# Patient Record
Sex: MALE | Race: OTHER | HISPANIC OR LATINO
[De-identification: names, ages, dates, MRNs, and addresses within clinical notes are randomized per-mention and may not be internally consistent; named-entity substitution may affect disease eponyms.]

---

## 2017-10-12 ENCOUNTER — APPOINTMENT (OUTPATIENT)
Dept: RADIATION ONCOLOGY | Facility: CLINIC | Age: 72
End: 2017-10-12

## 2017-11-16 ENCOUNTER — APPOINTMENT (OUTPATIENT)
Dept: GASTROENTEROLOGY | Facility: CLINIC | Age: 72
End: 2017-11-16
Payer: MEDICARE

## 2017-11-16 VITALS
RESPIRATION RATE: 16 BRPM | OXYGEN SATURATION: 98 % | WEIGHT: 124 LBS | HEIGHT: 62 IN | HEART RATE: 67 BPM | BODY MASS INDEX: 22.82 KG/M2 | SYSTOLIC BLOOD PRESSURE: 164 MMHG | DIASTOLIC BLOOD PRESSURE: 89 MMHG

## 2017-11-16 DIAGNOSIS — E78.5 HYPERLIPIDEMIA, UNSPECIFIED: ICD-10-CM

## 2017-11-16 DIAGNOSIS — I10 ESSENTIAL (PRIMARY) HYPERTENSION: ICD-10-CM

## 2017-11-16 DIAGNOSIS — Z85.89 PERSONAL HISTORY OF MALIGNANT NEOPLASM OF OTHER ORGANS AND SYSTEMS: ICD-10-CM

## 2017-11-16 PROCEDURE — 99204 OFFICE O/P NEW MOD 45 MIN: CPT

## 2017-11-16 RX ORDER — CYCLOBENZAPRINE HYDROCHLORIDE 10 MG/1
10 TABLET, FILM COATED ORAL
Refills: 0 | Status: ACTIVE | COMMUNITY

## 2017-11-16 RX ORDER — OMEPRAZOLE 40 MG/1
40 CAPSULE, DELAYED RELEASE ORAL
Qty: 30 | Refills: 5 | Status: ACTIVE | COMMUNITY
Start: 2017-11-16 | End: 1900-01-01

## 2017-11-16 RX ORDER — LEVOTHYROXINE SODIUM 25 UG/1
25 TABLET ORAL
Refills: 0 | Status: ACTIVE | COMMUNITY

## 2017-11-16 RX ORDER — AMLODIPINE BESYLATE 5 MG/1
5 TABLET ORAL
Refills: 0 | Status: ACTIVE | COMMUNITY

## 2017-11-16 RX ORDER — SIMVASTATIN 10 MG/1
10 TABLET, FILM COATED ORAL
Refills: 0 | Status: ACTIVE | COMMUNITY

## 2017-11-29 ENCOUNTER — OUTPATIENT (OUTPATIENT)
Dept: OUTPATIENT SERVICES | Facility: HOSPITAL | Age: 72
LOS: 1 days | End: 2017-11-29
Payer: COMMERCIAL

## 2017-11-29 DIAGNOSIS — R13.10 DYSPHAGIA, UNSPECIFIED: ICD-10-CM

## 2017-11-29 DIAGNOSIS — Z85.89 PERSONAL HISTORY OF MALIGNANT NEOPLASM OF OTHER ORGANS AND SYSTEMS: Chronic | ICD-10-CM

## 2017-11-29 PROCEDURE — 74220 X-RAY XM ESOPHAGUS 1CNTRST: CPT | Mod: 26

## 2017-11-29 PROCEDURE — 74220 X-RAY XM ESOPHAGUS 1CNTRST: CPT

## 2017-11-30 LAB
ANION GAP SERPL CALC-SCNC: 14 MMOL/L
BASOPHILS # BLD AUTO: 0.02 K/UL
BASOPHILS NFR BLD AUTO: 0.2 %
BUN SERPL-MCNC: 10 MG/DL
CALCIUM SERPL-MCNC: 9.9 MG/DL
CHLORIDE SERPL-SCNC: 98 MMOL/L
CO2 SERPL-SCNC: 28 MMOL/L
CREAT SERPL-MCNC: 0.94 MG/DL
EOSINOPHIL # BLD AUTO: 0.05 K/UL
EOSINOPHIL NFR BLD AUTO: 0.6 %
GLUCOSE SERPL-MCNC: 84 MG/DL
HCT VFR BLD CALC: 36.3 %
HGB BLD-MCNC: 11.8 G/DL
IMM GRANULOCYTES NFR BLD AUTO: 0.1 %
INR PPP: 1.03 RATIO
LYMPHOCYTES # BLD AUTO: 1.7 K/UL
LYMPHOCYTES NFR BLD AUTO: 18.7 %
MCHC RBC-ENTMCNC: 28.9 PG
MCHC RBC-ENTMCNC: 32.5 GM/DL
MCV RBC AUTO: 89 FL
MONOCYTES # BLD AUTO: 0.74 K/UL
MONOCYTES NFR BLD AUTO: 8.1 %
NEUTROPHILS # BLD AUTO: 6.57 K/UL
NEUTROPHILS NFR BLD AUTO: 72.3 %
PLATELET # BLD AUTO: 271 K/UL
POTASSIUM SERPL-SCNC: 4.6 MMOL/L
PT BLD: 11.6 SEC
RBC # BLD: 4.08 M/UL
RBC # FLD: 13.8 %
SODIUM SERPL-SCNC: 140 MMOL/L
WBC # FLD AUTO: 9.09 K/UL

## 2018-04-03 ENCOUNTER — APPOINTMENT (OUTPATIENT)
Dept: GASTROENTEROLOGY | Facility: GI CENTER | Age: 73
End: 2018-04-03
Payer: MEDICARE

## 2018-04-03 ENCOUNTER — OUTPATIENT (OUTPATIENT)
Dept: OUTPATIENT SERVICES | Facility: HOSPITAL | Age: 73
LOS: 1 days | End: 2018-04-03
Payer: COMMERCIAL

## 2018-04-03 ENCOUNTER — RESULT REVIEW (OUTPATIENT)
Age: 73
End: 2018-04-03

## 2018-04-03 DIAGNOSIS — Z85.89 PERSONAL HISTORY OF MALIGNANT NEOPLASM OF OTHER ORGANS AND SYSTEMS: Chronic | ICD-10-CM

## 2018-04-03 DIAGNOSIS — R19.4 CHANGE IN BOWEL HABIT: ICD-10-CM

## 2018-04-03 DIAGNOSIS — R13.10 DYSPHAGIA, UNSPECIFIED: ICD-10-CM

## 2018-04-03 PROCEDURE — 88104 CYTOPATH FL NONGYN SMEARS: CPT

## 2018-04-03 PROCEDURE — 88342 IMHCHEM/IMCYTCHM 1ST ANTB: CPT

## 2018-04-03 PROCEDURE — T1013: CPT

## 2018-04-03 PROCEDURE — 88305 TISSUE EXAM BY PATHOLOGIST: CPT | Mod: 26

## 2018-04-03 PROCEDURE — 43239 EGD BIOPSY SINGLE/MULTIPLE: CPT

## 2018-04-03 PROCEDURE — 88305 TISSUE EXAM BY PATHOLOGIST: CPT

## 2018-04-03 PROCEDURE — 88342 IMHCHEM/IMCYTCHM 1ST ANTB: CPT | Mod: 26

## 2018-04-03 PROCEDURE — 88104 CYTOPATH FL NONGYN SMEARS: CPT | Mod: 26

## 2018-04-06 LAB
NON-GYN CYTOLOGY SPEC: SIGNIFICANT CHANGE UP
SURGICAL PATHOLOGY FINAL REPORT - CH: SIGNIFICANT CHANGE UP

## 2018-04-10 ENCOUNTER — OTHER (OUTPATIENT)
Age: 73
End: 2018-04-10

## 2018-04-10 DIAGNOSIS — C15.9 MALIGNANT NEOPLASM OF ESOPHAGUS, UNSPECIFIED: ICD-10-CM

## 2018-04-11 ENCOUNTER — OTHER (OUTPATIENT)
Age: 73
End: 2018-04-11

## 2018-05-15 ENCOUNTER — OTHER (OUTPATIENT)
Age: 73
End: 2018-05-15

## 2018-06-05 ENCOUNTER — APPOINTMENT (OUTPATIENT)
Dept: CT IMAGING | Facility: CLINIC | Age: 73
End: 2018-06-05

## 2018-06-18 ENCOUNTER — INPATIENT (INPATIENT)
Facility: HOSPITAL | Age: 73
LOS: 9 days | Discharge: HOME | DRG: 374 | End: 2018-06-28
Attending: HOSPITALIST | Admitting: HOSPITALIST
Payer: MEDICARE

## 2018-06-18 VITALS
HEIGHT: 62 IN | DIASTOLIC BLOOD PRESSURE: 73 MMHG | HEART RATE: 72 BPM | TEMPERATURE: 98 F | WEIGHT: 111.99 LBS | SYSTOLIC BLOOD PRESSURE: 143 MMHG | RESPIRATION RATE: 18 BRPM | OXYGEN SATURATION: 98 %

## 2018-06-18 DIAGNOSIS — R13.10 DYSPHAGIA, UNSPECIFIED: ICD-10-CM

## 2018-06-18 DIAGNOSIS — Z85.89 PERSONAL HISTORY OF MALIGNANT NEOPLASM OF OTHER ORGANS AND SYSTEMS: Chronic | ICD-10-CM

## 2018-06-18 LAB
ALBUMIN SERPL ELPH-MCNC: 3.5 G/DL — SIGNIFICANT CHANGE UP (ref 3.3–5.2)
ALP SERPL-CCNC: 61 U/L — SIGNIFICANT CHANGE UP (ref 40–120)
ALT FLD-CCNC: 7 U/L — SIGNIFICANT CHANGE UP
ANION GAP SERPL CALC-SCNC: 15 MMOL/L — SIGNIFICANT CHANGE UP (ref 5–17)
APTT BLD: 30.9 SEC — SIGNIFICANT CHANGE UP (ref 27.5–37.4)
AST SERPL-CCNC: 14 U/L — SIGNIFICANT CHANGE UP
BASOPHILS # BLD AUTO: 0 K/UL — SIGNIFICANT CHANGE UP (ref 0–0.2)
BASOPHILS NFR BLD AUTO: 0.2 % — SIGNIFICANT CHANGE UP (ref 0–2)
BILIRUB SERPL-MCNC: 0.4 MG/DL — SIGNIFICANT CHANGE UP (ref 0.4–2)
BUN SERPL-MCNC: 18 MG/DL — SIGNIFICANT CHANGE UP (ref 8–20)
CALCIUM SERPL-MCNC: 9.2 MG/DL — SIGNIFICANT CHANGE UP (ref 8.6–10.2)
CHLORIDE SERPL-SCNC: 100 MMOL/L — SIGNIFICANT CHANGE UP (ref 98–107)
CO2 SERPL-SCNC: 25 MMOL/L — SIGNIFICANT CHANGE UP (ref 22–29)
CREAT SERPL-MCNC: 0.83 MG/DL — SIGNIFICANT CHANGE UP (ref 0.5–1.3)
EOSINOPHIL # BLD AUTO: 0.1 K/UL — SIGNIFICANT CHANGE UP (ref 0–0.5)
EOSINOPHIL NFR BLD AUTO: 0.6 % — SIGNIFICANT CHANGE UP (ref 0–5)
GLUCOSE SERPL-MCNC: 97 MG/DL — SIGNIFICANT CHANGE UP (ref 70–115)
HCT VFR BLD CALC: 33.3 % — LOW (ref 42–52)
HGB BLD-MCNC: 10.6 G/DL — LOW (ref 14–18)
INR BLD: 1.22 RATIO — HIGH (ref 0.88–1.16)
LIDOCAIN IGE QN: 11 U/L — LOW (ref 22–51)
LYMPHOCYTES # BLD AUTO: 1 K/UL — SIGNIFICANT CHANGE UP (ref 1–4.8)
LYMPHOCYTES # BLD AUTO: 7.9 % — LOW (ref 20–55)
MCHC RBC-ENTMCNC: 26.8 PG — LOW (ref 27–31)
MCHC RBC-ENTMCNC: 31.8 G/DL — LOW (ref 32–36)
MCV RBC AUTO: 84.1 FL — SIGNIFICANT CHANGE UP (ref 80–94)
MONOCYTES # BLD AUTO: 0.6 K/UL — SIGNIFICANT CHANGE UP (ref 0–0.8)
MONOCYTES NFR BLD AUTO: 4.9 % — SIGNIFICANT CHANGE UP (ref 3–10)
NEUTROPHILS # BLD AUTO: 10.4 K/UL — HIGH (ref 1.8–8)
NEUTROPHILS NFR BLD AUTO: 86.1 % — HIGH (ref 37–73)
PLATELET # BLD AUTO: 331 K/UL — SIGNIFICANT CHANGE UP (ref 150–400)
POTASSIUM SERPL-MCNC: 4.2 MMOL/L — SIGNIFICANT CHANGE UP (ref 3.5–5.3)
POTASSIUM SERPL-SCNC: 4.2 MMOL/L — SIGNIFICANT CHANGE UP (ref 3.5–5.3)
PROT SERPL-MCNC: 7.5 G/DL — SIGNIFICANT CHANGE UP (ref 6.6–8.7)
PROTHROM AB SERPL-ACNC: 13.5 SEC — HIGH (ref 9.8–12.7)
RBC # BLD: 3.96 M/UL — LOW (ref 4.6–6.2)
RBC # FLD: 13.5 % — SIGNIFICANT CHANGE UP (ref 11–15.6)
SODIUM SERPL-SCNC: 140 MMOL/L — SIGNIFICANT CHANGE UP (ref 135–145)
TROPONIN T SERPL-MCNC: <0.01 NG/ML — SIGNIFICANT CHANGE UP (ref 0–0.06)
WBC # BLD: 12.1 K/UL — HIGH (ref 4.8–10.8)
WBC # FLD AUTO: 12.1 K/UL — HIGH (ref 4.8–10.8)

## 2018-06-18 PROCEDURE — 71045 X-RAY EXAM CHEST 1 VIEW: CPT | Mod: 26

## 2018-06-18 PROCEDURE — 99223 1ST HOSP IP/OBS HIGH 75: CPT

## 2018-06-18 PROCEDURE — 93010 ELECTROCARDIOGRAM REPORT: CPT

## 2018-06-18 PROCEDURE — 99285 EMERGENCY DEPT VISIT HI MDM: CPT | Mod: 25

## 2018-06-18 RX ORDER — FERROUS SULFATE 325(65) MG
325 TABLET ORAL THREE TIMES A DAY
Qty: 0 | Refills: 0 | Status: DISCONTINUED | OUTPATIENT
Start: 2018-06-18 | End: 2018-06-28

## 2018-06-18 RX ORDER — SODIUM CHLORIDE 9 MG/ML
1000 INJECTION INTRAMUSCULAR; INTRAVENOUS; SUBCUTANEOUS
Qty: 0 | Refills: 0 | Status: DISCONTINUED | OUTPATIENT
Start: 2018-06-18 | End: 2018-06-23

## 2018-06-18 RX ORDER — AMLODIPINE BESYLATE 2.5 MG/1
5 TABLET ORAL DAILY
Qty: 0 | Refills: 0 | Status: DISCONTINUED | OUTPATIENT
Start: 2018-06-18 | End: 2018-06-28

## 2018-06-18 RX ORDER — SODIUM CHLORIDE 9 MG/ML
1000 INJECTION INTRAMUSCULAR; INTRAVENOUS; SUBCUTANEOUS ONCE
Qty: 0 | Refills: 0 | Status: COMPLETED | OUTPATIENT
Start: 2018-06-18 | End: 2018-06-18

## 2018-06-18 RX ORDER — LEVOTHYROXINE SODIUM 125 MCG
25 TABLET ORAL DAILY
Qty: 0 | Refills: 0 | Status: DISCONTINUED | OUTPATIENT
Start: 2018-06-18 | End: 2018-06-28

## 2018-06-18 RX ORDER — FAMOTIDINE 10 MG/ML
20 INJECTION INTRAVENOUS ONCE
Qty: 0 | Refills: 0 | Status: COMPLETED | OUTPATIENT
Start: 2018-06-18 | End: 2018-06-18

## 2018-06-18 RX ORDER — ONDANSETRON 8 MG/1
4 TABLET, FILM COATED ORAL ONCE
Qty: 0 | Refills: 0 | Status: COMPLETED | OUTPATIENT
Start: 2018-06-18 | End: 2018-06-18

## 2018-06-18 RX ORDER — ENOXAPARIN SODIUM 100 MG/ML
40 INJECTION SUBCUTANEOUS DAILY
Qty: 0 | Refills: 0 | Status: DISCONTINUED | OUTPATIENT
Start: 2018-06-18 | End: 2018-06-24

## 2018-06-18 RX ORDER — SIMVASTATIN 20 MG/1
10 TABLET, FILM COATED ORAL AT BEDTIME
Qty: 0 | Refills: 0 | Status: DISCONTINUED | OUTPATIENT
Start: 2018-06-18 | End: 2018-06-28

## 2018-06-18 RX ADMIN — ENOXAPARIN SODIUM 40 MILLIGRAM(S): 100 INJECTION SUBCUTANEOUS at 14:43

## 2018-06-18 RX ADMIN — FAMOTIDINE 20 MILLIGRAM(S): 10 INJECTION INTRAVENOUS at 08:51

## 2018-06-18 RX ADMIN — ONDANSETRON 4 MILLIGRAM(S): 8 TABLET, FILM COATED ORAL at 08:51

## 2018-06-18 RX ADMIN — Medication 325 MILLIGRAM(S): at 22:55

## 2018-06-18 RX ADMIN — Medication 325 MILLIGRAM(S): at 14:44

## 2018-06-18 RX ADMIN — Medication 25 MICROGRAM(S): at 14:44

## 2018-06-18 RX ADMIN — SIMVASTATIN 10 MILLIGRAM(S): 20 TABLET, FILM COATED ORAL at 22:56

## 2018-06-18 RX ADMIN — AMLODIPINE BESYLATE 5 MILLIGRAM(S): 2.5 TABLET ORAL at 14:44

## 2018-06-18 RX ADMIN — SODIUM CHLORIDE 1000 MILLILITER(S): 9 INJECTION INTRAMUSCULAR; INTRAVENOUS; SUBCUTANEOUS at 08:50

## 2018-06-18 NOTE — H&P ADULT - NSHPPHYSICALEXAM_GEN_ALL_CORE
PHYSICAL EXAM:    GENERAL: NAD, emaciated   HEAD:  Atraumatic, Normocephalic  EYES: EOMI, PERRLA  NERVOUS SYSTEM:  Alert & Oriented X3, Good concentration;   CHEST/LUNG: Clear to percussion bilaterally; No rales  HEART: Regular rate and rhythm; No murmurs, rubs, or gallops  ABDOMEN: Soft, Nontender,  EXTREMITIES:  muscle wasting, no edema   LYMPH: No lymphadenopathy noted  SKIN: No rashes or lesions

## 2018-06-18 NOTE — H&P ADULT - HISTORY OF PRESENT ILLNESS
Patient is a 73 yom with pmh  of htn, hld presents from home with difficulty swallowing which has gotten progressively got worse and now can only tolerate liquids. Patient is a very poor historian and even using a , patient   states he was just diagnosed with the esophageal cancer recently, but the records show he was diagnosed 7 years prior. Patient also complains of weight loss, lethargy and chest discomfort.  Patient denies having an oncologist and is  unaware of what meds he takes. Spoke to GI who states he had recent outpatient workup and had a ct and egd. Patient being admitted for failure to thrive. Patient denies any other complaints

## 2018-06-18 NOTE — H&P ADULT - ASSESSMENT
1) Newly diagnosed Esophageal cancer --> consulted hematology  --> spoke to GI and CT surgery   --> will continue clears for now    2) htn --> unsure of home meds  --> will call pharmacy    3) hld --> call pharmacy    4) dvt prop --> lovenox sub q    5) diet --> clear liquid diet

## 2018-06-18 NOTE — ED PROVIDER NOTE - MUSCULOSKELETAL, MLM
Spine appears normal, range of motion is not limited, no muscle or joint tenderness. Moves all 4 extremities, good pulses present.

## 2018-06-18 NOTE — ED PROVIDER NOTE - PROGRESS NOTE DETAILS
obtained CT/endoscopy results -- patient with esophageal cancer -- patient now admits to knowing the diagnosis but has not followed up  with dysphagia and esophageal mass, will admit  d/w Dr. Chuy LIZAMA to consult

## 2018-06-18 NOTE — ED PROVIDER NOTE - MEDICAL DECISION MAKING DETAILS
Vomiting after eating, questionable dysphasia with recent endoscopy. Check labs, attempt to contact PMD for endoscopy results and re-eval.

## 2018-06-18 NOTE — ED PROVIDER NOTE - OBJECTIVE STATEMENT
72 y/o M with PMHx of HTN and head and neck cancer (diagnosis 7 years ago, underwent chemo and radiation treatment, currently in remission) presents to ED c/o episodes of difficulty swallowing, emesis after eating and chest pain onset 4 days ago. States he does not have difficulty tolerating liquids but symptoms begin with eating solid foods. Denies fevers, chills, SOB, cough or any pain currently. States he recently say GI doctor, had endoscopy done, does not know results, and states he has another appointment tomorrow. No further acute complaints at this time.     PMD: Dr. Infante  GI: Dr. Bryson

## 2018-06-18 NOTE — ED ADULT NURSE NOTE - OBJECTIVE STATEMENT
73 x couple of weeks, every time he eats he feels like something is stuck, patient states he cannot keep anything down. patient states he went for an endoscopy,. appointments for results tomorrow. patient denies diarrhea, nausea but c/o vomiting, states last time he vomited was today. 73 x couple of weeks, every time he eats he feels like something is stuck, patient states he cannot keep anything down. patient states he went for an endoscopy,. appointments for results tomorrow. patient denies diarrhea, nausea but c/o vomiting, states last time he vomited was today. patient states he noticed he lost weight so wanted to come in.

## 2018-06-18 NOTE — ED PROVIDER NOTE - ENMT, MLM
Airway patent, Nasal mucosa clear. Mouth with normal mucosa. Throat has no vesicles, no oropharyngeal exudates and uvula is midline. Trache midline. Post surgical changes to L mandible. Tolerating secretions.

## 2018-06-18 NOTE — CONSULT NOTE ADULT - ASSESSMENT
Patient with recent squamous cell cancer, of distal esophagus/cardia.     1. Will recommend CT surgery, general surgery, oncology and radiation oncology evaluation  2. Consider rescanning-CT chest/abdomen  3. May need EUS/esophageal stenting   4. J tube evaluation  5. GI will follow up

## 2018-06-18 NOTE — CONSULT NOTE ADULT - ASSESSMENT
73 year old male with pmhx noted above most notably esophageal CA who presenting with worsening dysphagia being admitted to medicine service due to failure to thrive.

## 2018-06-18 NOTE — CONSULT NOTE ADULT - PROBLEM SELECTOR RECOMMENDATION 9
Admit to medicine service, Dr. Abebe recommending j tube placement by general surgery team. Patient unlikely candidate for resection due to high risk invasiveness of mass causing dysphagia. Thoracic surgery will continue to follow.

## 2018-06-18 NOTE — H&P ADULT - NSHPLABSRESULTS_GEN_ALL_CORE
10.6   12.1   )----------(  331       ( 18 Jun 2018 08:25 )               33.3      140    |  100    |  18.0   ----------------------------<  97         ( 18 Jun 2018 08:25 )  4.2     |  25.0   |  0.83     Ca    9.2        ( 18 Jun 2018 08:25 )    TPro  7.5    /  Alb  3.5    /  TBili  0.4    /  DBili  x      /  AST  14     /  ALT  7      /  AlkPhos  61     ( 18 Jun 2018 08:25 )    LIVER FUNCTIONS - ( 18 Jun 2018 08:25 )  Alb: 3.5 g/dL / Pro: 7.5 g/dL / ALK PHOS: 61 U/L / ALT: 7 U/L / AST: 14 U/L / GGT: x           PT/INR -  13.5 sec / 1.22 ratio   ( 18 Jun 2018 08:25 )       PTT -  30.9 sec   ( 18 Jun 2018 08:25 )  CAPILLARY BLOOD GLUCOSE    CARDIAC MARKERS ( 18 Jun 2018 08:25 )  x     / <0.01 ng/mL / x     / x     / x

## 2018-06-19 DIAGNOSIS — E78.00 PURE HYPERCHOLESTEROLEMIA, UNSPECIFIED: ICD-10-CM

## 2018-06-19 DIAGNOSIS — I10 ESSENTIAL (PRIMARY) HYPERTENSION: ICD-10-CM

## 2018-06-19 DIAGNOSIS — K22.9 DISEASE OF ESOPHAGUS, UNSPECIFIED: ICD-10-CM

## 2018-06-19 DIAGNOSIS — E03.9 HYPOTHYROIDISM, UNSPECIFIED: ICD-10-CM

## 2018-06-19 LAB
ANION GAP SERPL CALC-SCNC: 13 MMOL/L — SIGNIFICANT CHANGE UP (ref 5–17)
APPEARANCE UR: CLEAR — SIGNIFICANT CHANGE UP
BILIRUB UR-MCNC: NEGATIVE — SIGNIFICANT CHANGE UP
BUN SERPL-MCNC: 15 MG/DL — SIGNIFICANT CHANGE UP (ref 8–20)
CALCIUM SERPL-MCNC: 8.9 MG/DL — SIGNIFICANT CHANGE UP (ref 8.6–10.2)
CHLORIDE SERPL-SCNC: 100 MMOL/L — SIGNIFICANT CHANGE UP (ref 98–107)
CO2 SERPL-SCNC: 25 MMOL/L — SIGNIFICANT CHANGE UP (ref 22–29)
COLOR SPEC: YELLOW — SIGNIFICANT CHANGE UP
CREAT SERPL-MCNC: 0.87 MG/DL — SIGNIFICANT CHANGE UP (ref 0.5–1.3)
DIFF PNL FLD: ABNORMAL
GLUCOSE SERPL-MCNC: 95 MG/DL — SIGNIFICANT CHANGE UP (ref 70–115)
GLUCOSE UR QL: 250 MG/DL
HCT VFR BLD CALC: 33.5 % — LOW (ref 42–52)
HGB BLD-MCNC: 10.7 G/DL — LOW (ref 14–18)
KETONES UR-MCNC: ABNORMAL
LEUKOCYTE ESTERASE UR-ACNC: NEGATIVE — SIGNIFICANT CHANGE UP
MAGNESIUM SERPL-MCNC: 2 MG/DL — SIGNIFICANT CHANGE UP (ref 1.8–2.6)
MCHC RBC-ENTMCNC: 26.8 PG — LOW (ref 27–31)
MCHC RBC-ENTMCNC: 31.9 G/DL — LOW (ref 32–36)
MCV RBC AUTO: 83.8 FL — SIGNIFICANT CHANGE UP (ref 80–94)
NITRITE UR-MCNC: NEGATIVE — SIGNIFICANT CHANGE UP
PH UR: 5 — SIGNIFICANT CHANGE UP (ref 5–8)
PLATELET # BLD AUTO: 338 K/UL — SIGNIFICANT CHANGE UP (ref 150–400)
POTASSIUM SERPL-MCNC: 4.8 MMOL/L — SIGNIFICANT CHANGE UP (ref 3.5–5.3)
POTASSIUM SERPL-SCNC: 4.8 MMOL/L — SIGNIFICANT CHANGE UP (ref 3.5–5.3)
PROT UR-MCNC: 15 MG/DL
RBC # BLD: 4 M/UL — LOW (ref 4.6–6.2)
RBC # FLD: 13.4 % — SIGNIFICANT CHANGE UP (ref 11–15.6)
SODIUM SERPL-SCNC: 138 MMOL/L — SIGNIFICANT CHANGE UP (ref 135–145)
SP GR SPEC: 1.02 — SIGNIFICANT CHANGE UP (ref 1.01–1.02)
UROBILINOGEN FLD QL: NEGATIVE MG/DL — SIGNIFICANT CHANGE UP
WBC # BLD: 11.3 K/UL — HIGH (ref 4.8–10.8)
WBC # FLD AUTO: 11.3 K/UL — HIGH (ref 4.8–10.8)

## 2018-06-19 PROCEDURE — 99233 SBSQ HOSP IP/OBS HIGH 50: CPT

## 2018-06-19 PROCEDURE — 99233 SBSQ HOSP IP/OBS HIGH 50: CPT | Mod: GC

## 2018-06-19 RX ADMIN — Medication 325 MILLIGRAM(S): at 23:19

## 2018-06-19 RX ADMIN — SIMVASTATIN 10 MILLIGRAM(S): 20 TABLET, FILM COATED ORAL at 23:19

## 2018-06-19 RX ADMIN — Medication 325 MILLIGRAM(S): at 18:06

## 2018-06-19 RX ADMIN — SODIUM CHLORIDE 100 MILLILITER(S): 9 INJECTION INTRAMUSCULAR; INTRAVENOUS; SUBCUTANEOUS at 21:00

## 2018-06-19 RX ADMIN — SODIUM CHLORIDE 100 MILLILITER(S): 9 INJECTION INTRAMUSCULAR; INTRAVENOUS; SUBCUTANEOUS at 04:26

## 2018-06-19 RX ADMIN — AMLODIPINE BESYLATE 5 MILLIGRAM(S): 2.5 TABLET ORAL at 05:47

## 2018-06-19 RX ADMIN — Medication 25 MICROGRAM(S): at 05:47

## 2018-06-19 RX ADMIN — Medication 325 MILLIGRAM(S): at 05:47

## 2018-06-19 RX ADMIN — ENOXAPARIN SODIUM 40 MILLIGRAM(S): 100 INJECTION SUBCUTANEOUS at 11:35

## 2018-06-19 NOTE — PROGRESS NOTE ADULT - ASSESSMENT
73 y.o. male with PMHx of HTN, HLD, recent diagnosis of esophageal CA p/w difficulty swallowing associated with tolerance of liquids only, weight loss, lethargy and chest discomfort. Planned for HemOnc evaluation and surgery for J tube placement

## 2018-06-19 NOTE — CONSULT NOTE ADULT - ATTENDING COMMENTS
Metastatic workup - CT C/A/P  If possible would favor stent vs. PEG  If scope cannot be passed, would take patient for operative J tube  Needs chemoXRT if no signs of mets  Would re-evaluate for resection after course of chemoXRT if no mets or progression of disease

## 2018-06-19 NOTE — PROGRESS NOTE ADULT - SUBJECTIVE AND OBJECTIVE BOX
CC: poor po intake  HPI: 73 y.o. male with PMHx of HTN, HLD, recent diagnosis of esophageal CA p/w difficulty swallowing associated with tolerance of liquids only, weight loss, lethargy and chest discomfort.      INTERVAL HPI/OVERNIGHT EVENTS:no complaints  Other ROS reviewed and neg     Vital Signs Last 24 Hrs  T(C): 37 (2018 12:11), Max: 37.5 (2018 03:47)  T(F): 98.6 (2018 12:11), Max: 99.5 (2018 03:47)  HR: 52 (2018 12:11) (52 - 120)  BP: 115/63 (2018 12:11) (115/63 - 153/67)  RR: 18 (2018 12:11) (18 - 18)  SpO2: 97% (2018 12:11) (95% - 99%)  I&O's Detail    2018 07:01  -  2018 13:09  --------------------------------------------------------  IN:    sodium chloride 0.9%.: 100 mL  Total IN: 100 mL    OUT:  Total OUT: 0 mL    Total NET: 100 mL    CARDIAC MARKERS ( 2018 13:46 )  x     / <0.01 ng/mL / x     / x     / x      CARDIAC MARKERS ( 2018 08:25 )  x     / <0.01 ng/mL / x     / x     / x                            10.7   11.3  )-----------( 338      ( 2018 06:24 )             33.5     2018 06:24    138    |  100    |  15.0   ----------------------------<  95     4.8     |  25.0   |  0.87     Ca    8.9        2018 06:24  Mg     2.0       2018 06:24    TPro  7.5    /  Alb  3.5    /  TBili  0.4    /  DBili  x      /  AST  14     /  ALT  7      /  AlkPhos  61     2018 08:25    PT/INR - ( 2018 08:25 )   PT: 13.5 sec;   INR: 1.22 ratio       PTT - ( 2018 08:25 )  PTT:30.9 sec    LIVER FUNCTIONS - ( 2018 08:25 )  Alb: 3.5 g/dL / Pro: 7.5 g/dL / ALK PHOS: 61 U/L / ALT: 7 U/L / AST: 14 U/L / GGT: x           Urinalysis Basic - ( 2018 12:04 )    Color: Yellow / Appearance: Clear / S.025 / pH: x  Gluc: x / Ketone: Moderate  / Bili: Negative / Urobili: Negative mg/dL   Blood: x / Protein: 15 mg/dL / Nitrite: Negative   Leuk Esterase: Negative / RBC: x / WBC x   Sq Epi: x / Non Sq Epi: x / Bacteria: x    MEDICATIONS  (STANDING):  amLODIPine   Tablet 5 milliGRAM(s) Oral daily  enoxaparin Injectable 40 milliGRAM(s) SubCutaneous daily  ferrous    sulfate 325 milliGRAM(s) Oral three times a day  levothyroxine 25 MICROGram(s) Oral daily  simvastatin 10 milliGRAM(s) Oral at bedtime  sodium chloride 0.9%. 1000 milliLiter(s) (100 mL/Hr) IV Continuous <Continuous>    RADIOLOGY & ADDITIONAL TESTS: personally visualized    PHYSICAL EXAM:    General: debilitated male in no acute distress  Eyes: PERRLA, EOMI; conjunctiva and sclera clear  Head: Normocephalic; atraumatic  ENMT: No nasal discharge; airway clear  Neck: Supple; non tender; no masses  Respiratory: No wheezes, rales or rhonchi  Cardiovascular: Regular rate and rhythm. S1 and S2   Gastrointestinal: Soft abdomen, NT, + BS  Genitourinary: No costovertebral angle tenderness  Extremities: Normal range of motion, No clubbing, cyanosis or edema  Vascular: Peripheral pulses palpable 2+ bilaterally  Neurological: Alert and oriented x4  Skin: Warm and dry.   Musculoskeletal: Normal tone, without deformities  Psychiatric: Cooperative and appropriate

## 2018-06-19 NOTE — PROGRESS NOTE ADULT - SUBJECTIVE AND OBJECTIVE BOX
Pt seen and examined. Thoraci Surgery consult appreciated. J tube placement recommended.     REVIEW OF SYSTEMS:  Constitutional: No fever, weight loss or fatigue  Cardiovascular: No chest pain, palpitations, dizziness or leg swelling  Gastrointestinal: No abdominal or epigastric pain. No nausea, vomiting or hematemesis; No diarrhea or constipation. No melena or hematochezia.  Skin: No itching, burning, rashes or lesions       MEDICATIONS:  MEDICATIONS  (STANDING):  amLODIPine   Tablet 5 milliGRAM(s) Oral daily  enoxaparin Injectable 40 milliGRAM(s) SubCutaneous daily  ferrous    sulfate 325 milliGRAM(s) Oral three times a day  levothyroxine 25 MICROGram(s) Oral daily  simvastatin 10 milliGRAM(s) Oral at bedtime  sodium chloride 0.9%. 1000 milliLiter(s) (100 mL/Hr) IV Continuous <Continuous>    MEDICATIONS  (PRN):      Allergies    No Known Allergies    Intolerances        Vital Signs Last 24 Hrs  T(C): 37.4 (19 Jun 2018 07:18), Max: 37.5 (19 Jun 2018 03:47)  T(F): 99.3 (19 Jun 2018 07:18), Max: 99.5 (19 Jun 2018 03:47)  HR: 120 (19 Jun 2018 07:18) (57 - 120)  BP: 132/65 (19 Jun 2018 07:18) (129/65 - 153/67)  BP(mean): --  RR: 18 (19 Jun 2018 07:18) (18 - 18)  SpO2: 95% (19 Jun 2018 07:18) (95% - 99%)      PHYSICAL EXAM:    General: Well developed; well nourished; in no acute distress  HEENT: MMM, conjunctiva pink and sclera anicteric.  Lungs: clear to auscultation bilaterally.  Cor: RRR S1, S2 only.  Gastrointestinal: Abdomen: Soft non-tender non-distended; Normal bowel sounds; No hepatosplenomegaly  Extremities: no cyanosis, clubbing or edema.  Skin: Warm and dry. No obvious rash  Neuro: Pt. a + o x 3, no tremulousness or asterixsis    LABS:      CBC Full  -  ( 18 Jun 2018 08:25 )  WBC Count : 12.1 K/uL  Hemoglobin : 10.6 g/dL  Hematocrit : 33.3 %  Platelet Count - Automated : 331 K/uL  Mean Cell Volume : 84.1 fl  Mean Cell Hemoglobin : 26.8 pg  Mean Cell Hemoglobin Concentration : 31.8 g/dL  Auto Neutrophil # : 10.4 K/uL  Auto Lymphocyte # : 1.0 K/uL  Auto Monocyte # : 0.6 K/uL  Auto Eosinophil # : 0.1 K/uL  Auto Basophil # : 0.0 K/uL  Auto Neutrophil % : 86.1 %  Auto Lymphocyte % : 7.9 %  Auto Monocyte % : 4.9 %  Auto Eosinophil % : 0.6 %  Auto Basophil % : 0.2 %    06-19    138  |  100  |  15.0  ----------------------------<  95  4.8   |  25.0  |  0.87    Ca    8.9      19 Jun 2018 06:24  Mg     2.0     06-19    TPro  7.5  /  Alb  3.5  /  TBili  0.4  /  DBili  x   /  AST  14  /  ALT  7   /  AlkPhos  61  06-18    PT/INR - ( 18 Jun 2018 08:25 )   PT: 13.5 sec;   INR: 1.22 ratio         PTT - ( 18 Jun 2018 08:25 )  PTT:30.9 sec                  RADIOLOGY & ADDITIONAL STUDIES (The following images were personally reviewed):

## 2018-06-19 NOTE — CONSULT NOTE ADULT - SUBJECTIVE AND OBJECTIVE BOX
[facility]  MD Melida Jefferson MD  [phone]  Answering Joi : [phone]        THO [id number]      HPI:  Patient is a 73 yom with pmh  of htn, hld presents from home with difficulty swallowing which has gotten progressively got worse and now can only tolerate liquids. Patient is a very poor historian and even using a , patient   states he was just diagnosed with the esophageal cancer recently, im 2018  Past history of head and neck cancer details not available  Patient also complains of weight loss, lethargy and chest discomfort. unaware of what meds he takes. Spoke to GI who states he had recent outpatient workup and had a ct and egd. Patient being admitted for failure to thrive. Patient denies any other complaints (2018 12:47)      PAST MEDICAL & SURGICAL HISTORY:  High cholesterol  Hypothyroid  HTN (hypertension)  Head and neck cancer:   History of head and neck cancer      ANTIBIOTICS      Allergies    No Known Allergies    Intolerances        SOCIAL HISTORY:    FAMILY HISTORY:  No pertinent family history in first degree relatives      Vital Signs Last 24 Hrs  T(C): 36.9 (2018 20:15), Max: 37.5 (2018 03:47)  T(F): 98.4 (2018 20:15), Max: 99.5 (2018 03:47)  HR: 56 (2018 20:15) (52 - 120)  BP: 155/74 (2018 20:15) (115/63 - 155/74)  BP(mean): --  RR: 18 (2018 20:15) (18 - 18)  SpO2: 100% (2018 20:15) (95% - 100%)  Drug Dosing Weight  Height (cm): 157.48 (2018 07:17)  Weight (kg): 50.8 (2018 07:17)  BMI (kg/m2): 20.5 (2018 07:17)  BSA (m2): 1.49 (2018 07:17)      REVIEW OF SYSTEMS:    CONSTITUTIONAL:  As per HPI.    HEENT:  Eyes:  No diplopia or blurred vision. ENT:  No earache, sore throat or runny nose.    CARDIOVASCULAR:  No pressure, squeezing, strangling, tightness, heaviness or aching about the chest, neck, axilla or epigastrium.    RESPIRATORY:  No cough, shortness of breath, PND or orthopnea.    GASTROINTESTINAL:  No nausea, vomiting or diarrhea.difficulty in swallowing losing weight    GENITOURINARY:  No dysuria, frequency or urgency.    MUSCULOSKELETAL:  As per HPI.    SKIN:  No change in skin, hair or nails.    NEUROLOGIC:  No paresthesias, fasciculations, seizures or weakness.    PSYCHIATRIC:  No disorder of thought or mood.    ENDOCRINE:  No heat or cold intolerance, polyuria or polydipsia.    HEMATOLOGICAL:  No easy bruising or bleeding.           PHYSICAL EXAMINATION:    GENERAL: Weak cachectic looking    VITAL SIGNS:     HEENT: Head is normocephalic and atraumatic. Extraocular muscles are intact. Pupils are equal, round, and reactive to light and accommodation. Nares appeared normal. Mouth is well hydrated and without lesions. Mucous membranes are moist. Posterior pharynx clear of any exudate or lesions.    NECK: Supple. No carotid bruits.  No lymphadenopathy or thyromegaly.    LUNGS: Clear to auscultation.    HEART: Regular rate and rhythm without murmur.    ABDOMEN: Soft, nontender, and nondistended.  Positive bowel sounds.  No hepatosplenomegaly was noted.    EXTREMITIES: Without any cyanosis, clubbing, rash, lesions or edema.    NEUROLOGIC: Cranial nerves II through XII are grossly intact.    PSYCHIATRIC: Flat affect, but denies suicidal or homicidal ideations.  SKIN: No ulceration or induration present.    MICROBIOLOGY:      LABS:                        10.7   11.3  )-----------( 338      ( 2018 06:24 )             33.5         138  |  100  |  15.0  ----------------------------<  95  4.8   |  25.0  |  0.87    Ca    8.9      2018 06:24  Mg     2.0         TPro  7.5  /  Alb  3.5  /  TBili  0.4  /  DBili  x   /  AST  14  /  ALT  7   /  AlkPhos  61      PT/INR - ( 2018 08:25 )   PT: 13.5 sec;   INR: 1.22 ratio         PTT - ( 2018 08:25 )  PTT:30.9 sec  Urinalysis Basic - ( 2018 12:04 )    Color: Yellow / Appearance: Clear / S.025 / pH: x  Gluc: x / Ketone: Moderate  / Bili: Negative / Urobili: Negative mg/dL   Blood: x / Protein: 15 mg/dL / Nitrite: Negative   Leuk Esterase: Negative / RBC: x / WBC x   Sq Epi: x / Non Sq Epi: Occasional / Bacteria: x        RADIOLOGY & ADDITIONAL STUDIES:      ASSESSMENT:  73-year-old male 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 Past history of head and neck cancer as evident on surgical scars on his face  now admitted with difficulty in swallowing  It  appears that he has not had any treatment or follow-up for his cancer since his diagnosis  GI and surgical evaluations noted  PLAN:  Agree with restaging with CAT scan of his chest and abdomen  Patient would  most likely need PEG / J tube  feedings for nutritional support  Further  management pending results of CAT scan of chest and abdomen      PEGGY CHUN MD
Called to see patient for evaluation possible feeding tube.    Gi and Thoracic consultations noted   72 y/o [place] speaking male- with  at bedside, note patient poor historian.  patient states that can only take liquids and when swallows feels like getting stuck in upper chest region, and has been recently vomiting when attempts to take PO.      denies past surgical history    past medical history significant for: HTN, hypothyroid, head and neck cancer, high cholesterol     No reported allergies  denies smoking, etoh or drugs    See admission note for full past history...
HPI:  Patient is a 73 yom with pmh  of htn, hld, head and neck squamous cell cancer in the past s/p surgical resection and CT/RT in 2007 in the past. 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 CT abdomen was performed recently as well. No follow up with any oncology or CT surgery at this time. Comes with dysphagia. Can tolerate liquid diet. NO abdominal pain.     PAST MEDICAL & SURGICAL HISTORY:  High cholesterol  Hypothyroid  HTN (hypertension)  Head and neck cancer: 2007  History of head and neck cancer  Squamous cell cancer-esophageal      ROS:  No Heartburn, regurgitation,   +dysphagia, odynophagia.  No dyspepsia  No abdominal pain.    No Nausea, vomiting.  No Bleeding.  No hematemesis.   No diarrhea.    No hematochezia  + weight loss, anorexia.  No edema.      MEDICATIONS  (STANDING):  amLODIPine   Tablet 5 milliGRAM(s) Oral daily  enoxaparin Injectable 40 milliGRAM(s) SubCutaneous daily  ferrous    sulfate 325 milliGRAM(s) Oral three times a day  levothyroxine 25 MICROGram(s) Oral daily  simvastatin 10 milliGRAM(s) Oral at bedtime  sodium chloride 0.9%. 1000 milliLiter(s) (100 mL/Hr) IV Continuous <Continuous>    MEDICATIONS  (PRN):      Allergies    No Known Allergies    Intolerances        SOCIAL HISTORY:Ex smoker. Ex alcohol use. No drugs    ENDOSCOPIC/GI HISTORY:Recent EGD    FAMILY HISTORY:  No pertinent family history in first degree relatives      Vital Signs Last 24 Hrs  T(C): 36.8 (18 Jun 2018 14:38), Max: 36.8 (18 Jun 2018 07:17)  T(F): 98.2 (18 Jun 2018 14:38), Max: 98.3 (18 Jun 2018 07:17)  HR: 57 (18 Jun 2018 14:38) (57 - 72)  BP: 153/67 (18 Jun 2018 14:38) (143/73 - 153/67)  BP(mean): --  RR: 18 (18 Jun 2018 14:38) (18 - 18)  SpO2: 99% (18 Jun 2018 14:38) (98% - 99%)    PHYSICAL EXAM:    GENERAL: NAD, well-groomed, well-developed  HEAD:  Atraumatic, Normocephalic  EYES: EOMI, PERRLA, conjunctiva and sclera clear  ENMT: No tonsillar erythema, exudates, or enlargement; Moist mucous membranes, Good dentition, No lesions  NECK: Supple, No JVD, Normal thyroid  CHEST/LUNG: Clear to percussion bilaterally; No rales, rhonchi, wheezing, or rubs  HEART: Regular rate and rhythm; No murmurs, rubs, or gallops  ABDOMEN: Soft, Nontender, Nondistended; Bowel sounds present  EXTREMITIES:  2+ Peripheral Pulses, No clubbing, cyanosis, or edema  LYMPH: No lymphadenopathy noted  SKIN: No rashes or lesions      LABS:                        10.6   12.1  )-----------( 331      ( 18 Jun 2018 08:25 )             33.3     06-18    140  |  100  |  18.0  ----------------------------<  97  4.2   |  25.0  |  0.83    Ca    9.2      18 Jun 2018 08:25    TPro  7.5  /  Alb  3.5  /  TBili  0.4  /  DBili  x   /  AST  14  /  ALT  7   /  AlkPhos  61  06-18    PT/INR - ( 18 Jun 2018 08:25 )   PT: 13.5 sec;   INR: 1.22 ratio         PTT - ( 18 Jun 2018 08:25 )  PTT:30.9 sec       LIVER FUNCTIONS - ( 18 Jun 2018 08:25 )  Alb: 3.5 g/dL / Pro: 7.5 g/dL / ALK PHOS: 61 U/L / ALT: 7 U/L / AST: 14 U/L / GGT: x               RADIOLOGY & ADDITIONAL STUDIES:
Surgeon: Mis JACKSON    Consult requesting by: Chuy JACKSON    HISTORY OF PRESENT ILLNESS:    73 year old poor historian male with pmhx of HTN, HLD, who presents from home with difficulty swallowing which has gotten progressively worse to the point where he can now only tolerate liquids. Patient states he was just diagnosed with the esophageal cancer recently, but the records show he was diagnosed 7 years prior. Patient also complains of weight loss, lethargy and chest discomfort.  Patient denies having an oncologist and is unaware of what meds he takes. Patient denies any other complaints      PAST MEDICAL & SURGICAL HISTORY:  High cholesterol  Hypothyroid  HTN (hypertension)  Head and neck cancer: 2007  History of head and neck cancer    FAMILY HISTORY:  No pertinent family history in first degree relatives    SOCIAL HISTORY:  Smoker: [ ] Yes  [X] No        PACK YEARS:                         WHEN QUIT?  ETOH use: [ ] Yes  [x] No              FREQUENCY / QUANTITY:  Ilicit Drug use:  [ ] Yes  [X] No    MEDICATIONS  (STANDING):  amLODIPine   Tablet 5 milliGRAM(s) Oral daily  enoxaparin Injectable 40 milliGRAM(s) SubCutaneous daily  ferrous    sulfate 325 milliGRAM(s) Oral three times a day  levothyroxine 25 MICROGram(s) Oral daily  simvastatin 10 milliGRAM(s) Oral at bedtime  sodium chloride 0.9%. 1000 milliLiter(s) (100 mL/Hr) IV Continuous <Continuous>    MEDICATIONS  (PRN):      Allergies  No Known Allergies            Review of Systems  CONSTITUTIONAL:  Fevers[ ] chills[ ] sweats[ ] fatigue[ ] weight loss[x] weight gain [ ]                                     NEGATIVE [ ]   ENMT:  difficulty hearing [ ]  vertigo[ ]  dysphagia[x] epistaxis[ ] recent dental work [ ]                                    NEGATIVE[ ]   CV:  chest pain[ ] palpitations[ ] HOOVER [ ] diaphoresis [ ]                                                                                           NEGATIVE[x]   RESPIRATORY:  wheezing[ ] SOB[ ] cough [ ] sputum[ ] hemoptysis[ ]                                                                  NEGATIVE[x]  GI:  nausea[ ]  vommiting [ ]  diarrhea[ ] constipation [ ] melena [ ]                                                                      NEGATIVE[x]  : hematuria[ ]  dysuria[ ] urgency[ ] incontinence[ ]                                                                                            NEGATIVE[x   MUSKULOSKELETAL:  arthritis[ ]  joint swelling [ ] muscle weakness [ ]                                                                NEGATIVE[ ]   SKIN/BREAST:  rash[ ] itching [ ]  hair loss[ ] masses[ ]                                                                                              NEGATIVE[x]  HEME/LYMPH:  bruises easily[ ] enlarged lymph nodes[ ] tender lymph nodes[ ]                                               NEGATIVE[x]      PHYSICAL EXAM  Vital Signs Last 24 Hrs  T(C): 36.8 (18 Jun 2018 07:17), Max: 36.8 (18 Jun 2018 07:17)  T(F): 98.3 (18 Jun 2018 07:17), Max: 98.3 (18 Jun 2018 07:17)  HR: 72 (18 Jun 2018 07:17) (72 - 72)  BP: 143/73 (18 Jun 2018 07:17) (143/73 - 143/73)  BP(mean): --  RR: 18 (18 Jun 2018 07:17) (18 - 18)  SpO2: 98% (18 Jun 2018 07:17) (98% - 98%)    CONSTITUTIONAL:                                                                          WNL[x]   Neuro: WNL[x Normal exam oriented to person/place & time with no focal motor or sensory  deficits. Other __________                      ENT: WNL[x]    Normal exam of nasal/oral mucosa with absence of cyanosis. Other no visible external masses noted.  Neck: WNL[x]  Normal exam of jugular veins, trachea & thyroid. Other_________________________________________  Chest: WNL[x] Normal lung exam with good air movement absence of wheezes, rales, or rhonchi: Other_________________________________________                                                                                CV:  Auscultation: normal [x]S3[ ] S4[ ] Irregular [ ] Rub[ ] Clicks[ ]    Murmurs none:[x]systolic [ ]  diastolic [ ] holosystolic [ ]                                                                                    GI: WNL[x] Normal exam of abdomen, liver & spleen with no noted masses or tenderness. Other______________________                                                                                                        Extremities: WNL[x] Normal no evidence of cyanosis or deformity Edema: none[x]trace[ ]1+[ ]2+[ ]3+[ ]4+[ ]  Lower Extremity Pulses: Right[2+] Left[2+]Varicosities[ ]  SKIN :WNL[x] Normal exam to inspection & palation. Other:____________________                                                          LABS:                        10.6   12.1  )-----------( 331      ( 18 Jun 2018 08:25 )             33.3     06-18    140  |  100  |  18.0  ----------------------------<  97  4.2   |  25.0  |  0.83    Ca    9.2      18 Jun 2018 08:25    TPro  7.5  /  Alb  3.5  /  TBili  0.4  /  DBili  x   /  AST  14  /  ALT  7   /  AlkPhos  61  06-18    PT/INR - ( 18 Jun 2018 08:25 )   PT: 13.5 sec;   INR: 1.22 ratio         PTT - ( 18 Jun 2018 08:25 )  PTT:30.9 sec    CARDIAC MARKERS ( 18 Jun 2018 08:25 )  x     / <0.01 ng/mL / x     / x     / x        CXR:  < from: Xray Chest 1 View- PORTABLE-Urgent (06.18.18 @ 10:24) >  FINDINGS:    The airway is midline.  There are no airspace consolidations.  There is no pleural effusion or pneumothorax.   The cardiac silhouette is normal size.   The bones are normal.     IMPRESSION:    No acute cardiopulmonary findings.        Surgical Pathology Final Report -    (04.03.18 @ 10:16)    Surgical Pathology Final Report - :   ACCESSION No:  [id number]  THO GRANT                 2    Surgical Final Report    Final Diagnosis  1: Stomach, antrum, biopsy: Unremarkable gastric mucosa, negative  for H. pylori organisms (H. Pylori immunostain)    2: Stomach, body, biopsy: Unremarkable gastric mucosa, negative  for H. pylori organisms (H. Pylori immunostain)    3: Esophagus, biopsy: Infiltrating squamous cell carcinoma,  moderately to poorly differentiated with extensive tumor  necrosis.    Case reviewed intradepartmentally    Case discussed with Dr. Bryson, 4/6/2018,  2:45 PM    Ishmael Cardozo MD  (Electronic Signature)  Reported on: 04/06/18    Comment  This case represents a current recent malignant diagnosis. This  patient will beregistered in the [facility] Registry Database in  accordance with [facility] Public Health Law 2401. The cancer registrar  and or [place] may contact you for clinical follow up.    Clinical History  R/O H pylori, ?    Specimen(s) Submitted  1     Biopsy antrum  2     Biopsy gastric body  3     Bipsy esophageal mass    Gross Description  1.   The specimen is received in formalin and the specimen  container is labeled: Antrum biopsy.  It consists of two  fragments of tan soft tissue ranging from 0.2-0.4 cm in greatest  dimension.  Special stains are requested.  Entirely submitted.  One cassette.    2.   The specimen is received in formalin and the specimen  container is labeled: Gastric body biopsy.  It consists of a  single fragment of tan soft tissue measuring 0.5 cm in greatest  dimension.  Special stains are requested.  Entirely submitted.  One cassette.    3.   The specimen is received in formalin and the specimen  container is labeled: Esophageal mass biopsy.  It consists    THO GRANT                 2    Surgical Final Report  of multiple fragments of pink-tan lobulated soft tissue ranging  from less than 0.1-0.5 cm in greatest dimension.  Entirely  submitted.  Three cassettes.    In addition to other data that may appear on the specimen  containers, all labels have been inspected to confirm the  presence of the patient's name and date of birth.  MARY ELLEN 04/04/18 11:34

## 2018-06-19 NOTE — CONSULT NOTE ADULT - ASSESSMENT
PE:  72 y/o male awake and responsive to question ( poor historian and difficult even for  to understand. )   afebrile VSS cachetic appearing male in no acute distress  appears to have difficult time with speech     deformity of mouth and neck ( left side )   trachea mid-line no apparent masses.  full ROM neck with out evidence of pain  note appears to have  some swallowing difficulty ( even wood )     chest : fair air exchange, denies SOB states has mid- upper chest pain and discomfort.    Abdomen:  flat non-distended, non-tender on palpation - note well healed surgical scar full length - mid-line. ( still denies surgery. )                   + BS no change in his bowel habits .                    without inguinal pain or masses appreciated   extremties:  warm to toes with out calf pain or tenderness, states ambulates ok    labs:  cbc: wbc 11.3  ( down from 12. 1 ) /hg 10.7/ hct 33.5/ plts 338           lytes WNL           mag. WNL            troponin: <0.01    radiology : x-rays pending review      Impression :  distal esphageal lesion ( states only liquids go down and difficulty now ) ( reported to be unresectable  ; patient in no acute distress;  Cachetic appearing; failure to thrive .  Plan : continue present acre and work-up discussed with Dr. Ruiz, John Jimenez and Dr. Vera )   Will follow pending metastatic work up then discuss with medicine and GI definitive care and management recommendations of feeding tube.    Thank you for this consultation.                        noted prior w/u CT 2007 reported :   ( to obtain and review old studies ) PE:  72 y/o male awake and responsive to question ( poor historian and difficult even for  to understand. )   afebrile VSS cachetic appearing male in no acute distress  appears to have difficult time with speech     deformity of mouth and neck ( left side )   trachea mid-line no apparent masses.  full ROM neck with out evidence of pain  note appears to have  some swallowing difficulty ( even wood )     chest : fair air exchange, denies SOB states has mid- upper chest pain and discomfort.    Abdomen:  flat non-distended, non-tender on palpation - note well healed surgical scar full length - mid-line. ( still denies surgery. )                   + BS no change in his bowel habits .                    without inguinal pain or masses appreciated   extremties:  warm to toes with out calf pain or tenderness, states ambulates ok    labs:  cbc: wbc 11.3  ( down from 12. 1 ) /hg 10.7/ hct 33.5/ plts 338           lytes WNL           mag. WNL            troponin: <0.01    radiology : x-rays pending review      Impression :  distal esphageal lesion ( states only liquids go down and difficulty now ) ( reported to be unresectable  ; patient in no acute distress;  Cachetic appearing; failure to thrive . abdominal surgical scar ? unknown procedure at present.   Plan : continue present acre and work-up discussed with Dr. Ruiz, John Jimenez and Dr. Vera )   Will follow pending metastatic work up then discuss with medicine and GI definitive care and management recommendations of feeding tube.  attempt to locate old records. 	  Thank you for this consultation.                        noted prior w/u CT 2007 reported :   ( to obtain and review old studies )

## 2018-06-20 PROCEDURE — 71260 CT THORAX DX C+: CPT | Mod: 26

## 2018-06-20 PROCEDURE — 74177 CT ABD & PELVIS W/CONTRAST: CPT | Mod: 26

## 2018-06-20 PROCEDURE — 99232 SBSQ HOSP IP/OBS MODERATE 35: CPT

## 2018-06-20 PROCEDURE — 99233 SBSQ HOSP IP/OBS HIGH 50: CPT | Mod: GC

## 2018-06-20 RX ORDER — PANTOPRAZOLE SODIUM 20 MG/1
40 TABLET, DELAYED RELEASE ORAL DAILY
Qty: 0 | Refills: 0 | Status: DISCONTINUED | OUTPATIENT
Start: 2018-06-20 | End: 2018-06-25

## 2018-06-20 RX ADMIN — Medication 325 MILLIGRAM(S): at 06:26

## 2018-06-20 RX ADMIN — SODIUM CHLORIDE 100 MILLILITER(S): 9 INJECTION INTRAMUSCULAR; INTRAVENOUS; SUBCUTANEOUS at 16:31

## 2018-06-20 RX ADMIN — Medication 25 MICROGRAM(S): at 06:25

## 2018-06-20 RX ADMIN — SIMVASTATIN 10 MILLIGRAM(S): 20 TABLET, FILM COATED ORAL at 21:19

## 2018-06-20 RX ADMIN — Medication 325 MILLIGRAM(S): at 21:19

## 2018-06-20 RX ADMIN — ENOXAPARIN SODIUM 40 MILLIGRAM(S): 100 INJECTION SUBCUTANEOUS at 11:44

## 2018-06-20 RX ADMIN — AMLODIPINE BESYLATE 5 MILLIGRAM(S): 2.5 TABLET ORAL at 06:26

## 2018-06-20 RX ADMIN — PANTOPRAZOLE SODIUM 40 MILLIGRAM(S): 20 TABLET, DELAYED RELEASE ORAL at 11:45

## 2018-06-20 NOTE — PROGRESS NOTE ADULT - SUBJECTIVE AND OBJECTIVE BOX
Imaging reviewed.  Locally advanced esophageal cancer.  No evidence of hematogenous metastasis.  Good candidate for chemoXRT.  Will need stent for palliation vs. PEG.  Spoke to GI Dr. Lopez and will plan for EGD.

## 2018-06-20 NOTE — PROGRESS NOTE ADULT - ASSESSMENT
73 y.o. male with PMHx of HTN, HLD, recent diagnosis of esophageal CA p/w difficulty swallowing associated with tolerance of liquids only, weight loss, lethargy and chest discomfort. Planned for CT chest/abd/pelvis for restaging and decision on stent vs J-tube

## 2018-06-20 NOTE — PROGRESS NOTE ADULT - SUBJECTIVE AND OBJECTIVE BOX
CC: poor po intake  HPI: 73 y.o. male with PMHx of HTN, HLD, recent diagnosis of esophageal CA p/w difficulty swallowing associated with tolerance of liquids only, weight loss, lethargy and chest discomfort.      INTERVAL HPI/OVERNIGHT EVENTS: no complaints, awaiting CT's  Other ROS reviewed and neg     Vital Signs Last 24 Hrs  T(C): 36.9 (2018 08:00), Max: 37 (2018 12:11)  T(F): 98.4 (2018 08:00), Max: 98.6 (2018 12:11)  HR: 58 (2018 08:00) (52 - 58)  BP: 138/65 (2018 08:00) (115/63 - 155/74)  RR: 18 (2018 08:00) (16 - 18)  SpO2: 100% (2018 08:00) (97% - 100%)    CARDIAC MARKERS ( 2018 13:46 )  x     / <0.01 ng/mL / x     / x     / x                           10.7   11.3  )-----------( 338      ( 2018 06:24 )             33.5     2018 06:24    138    |  100    |  15.0   ----------------------------<  95     4.8     |  25.0   |  0.87     Ca    8.9        2018 06:24  Mg     2.0       2018 06:24    Urinalysis Basic - ( 2018 12:04 )    Color: Yellow / Appearance: Clear / S.025 / pH: x  Gluc: x / Ketone: Moderate  / Bili: Negative / Urobili: Negative mg/dL   Blood: x / Protein: 15 mg/dL / Nitrite: Negative   Leuk Esterase: Negative / RBC: x / WBC x   Sq Epi: x / Non Sq Epi: Occasional / Bacteria: x    MEDICATIONS  (STANDING):  amLODIPine   Tablet 5 milliGRAM(s) Oral daily  enoxaparin Injectable 40 milliGRAM(s) SubCutaneous daily  ferrous    sulfate 325 milliGRAM(s) Oral three times a day  levothyroxine 25 MICROGram(s) Oral daily  pantoprazole  Injectable 40 milliGRAM(s) IV Push daily  simvastatin 10 milliGRAM(s) Oral at bedtime  sodium chloride 0.9%. 1000 milliLiter(s) (100 mL/Hr) IV Continuous <Continuous>    RADIOLOGY & ADDITIONAL TESTS: personally visualized    PHYSICAL EXAM:    General: debilitated male in no acute distress  Eyes: PERRLA, EOMI; conjunctiva and sclera clear  Head: Normocephalic; atraumatic  ENMT: No nasal discharge; airway clear  Neck: Supple; non tender; no masses  Respiratory: No wheezes, rales or rhonchi  Cardiovascular: Regular rate and rhythm. S1 and S2   Gastrointestinal: Soft abdomen, NT, + BS  Genitourinary: No costovertebral angle tenderness  Extremities: Normal range of motion, No clubbing, cyanosis or edema  Vascular: Peripheral pulses palpable 2+ bilaterally  Neurological: Alert and oriented x4  Skin: Warm and dry.   Musculoskeletal: Normal tone, without deformities  Psychiatric: Cooperative and appropriate

## 2018-06-20 NOTE — PROGRESS NOTE ADULT - SUBJECTIVE AND OBJECTIVE BOX
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    REVIEW OF SYSTEMS:    CONSTITUTIONAL: No fever, weight loss, or fatigue  EYES: No eye pain, visual disturbances, or discharge  ENMT:  No difficulty hearing, tinnitus, vertigo; No sinus or throat pain  RESPIRATORY: No cough, wheezing, chills or hemoptysis; No shortness of breath  CARDIOVASCULAR: No chest pain, palpitations, dizziness, or leg swelling  GASTROINTESTINAL: as above    MEDICATIONS:  MEDICATIONS  (STANDING):  amLODIPine   Tablet 5 milliGRAM(s) Oral daily  enoxaparin Injectable 40 milliGRAM(s) SubCutaneous daily  ferrous    sulfate 325 milliGRAM(s) Oral three times a day  levothyroxine 25 MICROGram(s) Oral daily  simvastatin 10 milliGRAM(s) Oral at bedtime  sodium chloride 0.9%. 1000 milliLiter(s) (100 mL/Hr) IV Continuous <Continuous>    MEDICATIONS  (PRN):      Allergies    No Known Allergies    Intolerances        Vital Signs Last 24 Hrs  T(C): 36.8 (2018 04:06), Max: 37 (2018 12:11)  T(F): 98.2 (2018 04:06), Max: 98.6 (2018 12:11)  HR: 57 (2018 04:06) (52 - 57)  BP: 128/65 (2018 04:06) (115/63 - 155/74)  BP(mean): --  RR: 16 (2018 04:06) (16 - 18)  SpO2: 100% (2018 20:15) (97% - 100%)    -19 @ 07:01  -  06-20 @ 07:00  --------------------------------------------------------  IN: 800 mL / OUT: 0 mL / NET: 800 mL        PHYSICAL EXAM:    General: Thin  male in NAD in no acute distress  HEENT: MMM, conjunctiva and sclera clear, post op changes in jaw and neck  Gastrointestinal:Abdomen: Soft non-tender non-distended; Normal bowel sounds; No hepatosplenomegaly  Extremities: no cyanosis, clubbing or edema.  Skin: Warm and dry. No obvious rash    LABS:      CBC Full  -  ( 2018 06:24 )  WBC Count : 11.3 K/uL  Hemoglobin : 10.7 g/dL  Hematocrit : 33.5 %  Platelet Count - Automated : 338 K/uL  Mean Cell Volume : 83.8 fl  Mean Cell Hemoglobin : 26.8 pg  Mean Cell Hemoglobin Concentration : 31.9 g/dL  Auto Neutrophil # : x  Auto Lymphocyte # : x  Auto Monocyte # : x  Auto Eosinophil # : x  Auto Basophil # : x  Auto Neutrophil % : x  Auto Lymphocyte % : x  Auto Monocyte % : x  Auto Eosinophil % : x  Auto Basophil % : x    -    138  |  100  |  15.0  ----------------------------<  95  4.8   |  25.0  |  0.87    Ca    8.9      2018 06:24  Mg     2.0     -    TPro  7.5  /  Alb  3.5  /  TBili  0.4  /  DBili  x   /  AST  14  /  ALT  7   /  AlkPhos  61  06-18    PT/INR - ( 2018 08:25 )   PT: 13.5 sec;   INR: 1.22 ratio         PTT - ( 2018 08:25 )  PTT:30.9 sec      Urinalysis Basic - ( 2018 12:04 )    Color: Yellow / Appearance: Clear / S.025 / pH: x  Gluc: x / Ketone: Moderate  / Bili: Negative / Urobili: Negative mg/dL   Blood: x / Protein: 15 mg/dL / Nitrite: Negative   Leuk Esterase: Negative / RBC: x / WBC x   Sq Epi: x / Non Sq Epi: Occasional / Bacteria: x

## 2018-06-21 LAB
ANION GAP SERPL CALC-SCNC: 12 MMOL/L — SIGNIFICANT CHANGE UP (ref 5–17)
BUN SERPL-MCNC: 5 MG/DL — LOW (ref 8–20)
CALCIUM SERPL-MCNC: 8.7 MG/DL — SIGNIFICANT CHANGE UP (ref 8.6–10.2)
CEA SERPL-MCNC: 2.5 NG/ML — SIGNIFICANT CHANGE UP (ref 0–3.8)
CHLORIDE SERPL-SCNC: 104 MMOL/L — SIGNIFICANT CHANGE UP (ref 98–107)
CO2 SERPL-SCNC: 25 MMOL/L — SIGNIFICANT CHANGE UP (ref 22–29)
CREAT SERPL-MCNC: 0.79 MG/DL — SIGNIFICANT CHANGE UP (ref 0.5–1.3)
GLUCOSE SERPL-MCNC: 101 MG/DL — SIGNIFICANT CHANGE UP (ref 70–115)
HCT VFR BLD CALC: 31.5 % — LOW (ref 42–52)
HGB BLD-MCNC: 10.2 G/DL — LOW (ref 14–18)
MCHC RBC-ENTMCNC: 27.5 PG — SIGNIFICANT CHANGE UP (ref 27–31)
MCHC RBC-ENTMCNC: 32.4 G/DL — SIGNIFICANT CHANGE UP (ref 32–36)
MCV RBC AUTO: 84.9 FL — SIGNIFICANT CHANGE UP (ref 80–94)
PLATELET # BLD AUTO: 252 K/UL — SIGNIFICANT CHANGE UP (ref 150–400)
POTASSIUM SERPL-MCNC: 4.1 MMOL/L — SIGNIFICANT CHANGE UP (ref 3.5–5.3)
POTASSIUM SERPL-SCNC: 4.1 MMOL/L — SIGNIFICANT CHANGE UP (ref 3.5–5.3)
PREALB SERPL-MCNC: 9 MG/DL — LOW (ref 18–38)
PROCALCITONIN SERPL-MCNC: <0.05 NG/ML — HIGH (ref 0–0.04)
RBC # BLD: 3.71 M/UL — LOW (ref 4.6–6.2)
RBC # FLD: 13.6 % — SIGNIFICANT CHANGE UP (ref 11–15.6)
SODIUM SERPL-SCNC: 141 MMOL/L — SIGNIFICANT CHANGE UP (ref 135–145)
WBC # BLD: 8.4 K/UL — SIGNIFICANT CHANGE UP (ref 4.8–10.8)
WBC # FLD AUTO: 8.4 K/UL — SIGNIFICANT CHANGE UP (ref 4.8–10.8)

## 2018-06-21 PROCEDURE — 99232 SBSQ HOSP IP/OBS MODERATE 35: CPT | Mod: GC

## 2018-06-21 PROCEDURE — 99232 SBSQ HOSP IP/OBS MODERATE 35: CPT

## 2018-06-21 PROCEDURE — 74220 X-RAY XM ESOPHAGUS 1CNTRST: CPT | Mod: 26

## 2018-06-21 RX ADMIN — Medication 325 MILLIGRAM(S): at 05:26

## 2018-06-21 RX ADMIN — AMLODIPINE BESYLATE 5 MILLIGRAM(S): 2.5 TABLET ORAL at 05:26

## 2018-06-21 RX ADMIN — Medication 325 MILLIGRAM(S): at 14:07

## 2018-06-21 RX ADMIN — SIMVASTATIN 10 MILLIGRAM(S): 20 TABLET, FILM COATED ORAL at 21:15

## 2018-06-21 RX ADMIN — SODIUM CHLORIDE 100 MILLILITER(S): 9 INJECTION INTRAMUSCULAR; INTRAVENOUS; SUBCUTANEOUS at 14:08

## 2018-06-21 RX ADMIN — SODIUM CHLORIDE 100 MILLILITER(S): 9 INJECTION INTRAMUSCULAR; INTRAVENOUS; SUBCUTANEOUS at 02:59

## 2018-06-21 RX ADMIN — Medication 25 MICROGRAM(S): at 05:26

## 2018-06-21 RX ADMIN — Medication 325 MILLIGRAM(S): at 21:15

## 2018-06-21 RX ADMIN — ENOXAPARIN SODIUM 40 MILLIGRAM(S): 100 INJECTION SUBCUTANEOUS at 14:07

## 2018-06-21 RX ADMIN — PANTOPRAZOLE SODIUM 40 MILLIGRAM(S): 20 TABLET, DELAYED RELEASE ORAL at 14:07

## 2018-06-21 NOTE — PROGRESS NOTE ADULT - PROBLEM SELECTOR PLAN 1
squamous cell cancer- will await esophagram and then make decision about esophageal stent versus PEG with discussion with patient.

## 2018-06-21 NOTE — PROGRESS NOTE ADULT - ASSESSMENT
74 y/o male with PMHx of HTN, HLD, recent diagnosis of esophageal CA p/w difficulty swallowing associated with tolerance of liquids only, weight loss, lethargy and chest discomfort. CT chest/abd/pelvis with thickening of mid to lower esophagus over 7.9cm; and subcarinal and gastrohepatic mets lymphade 72 y/o male with PMHx of HTN, HLD, recent diagnosis of esophageal CA p/w difficulty swallowing associated with tolerance of liquids only, weight loss, lethargy and chest discomfort. CT chest/abd/pelvis with thickening of mid to lower esophagus over 7.9cm; and subcarinal and gastrohepatic metastatic lymphadenopathy.  GI consulted and recommending stent vs peg.

## 2018-06-21 NOTE — PROGRESS NOTE ADULT - SUBJECTIVE AND OBJECTIVE BOX
Pt seen and examined f/u squamous cell cancer of distal 8cm of esophagus  This morning he has no new complaints. Tolerating liquids and pills. Cat scans of chest/abd/pelvis reveal the lesion in the dista 8cm of the esophagus with meatstatic LN. Small pleaural effusions. No other significant abnormalities. Pan is for radiation/chemo after stent or PEG. Will continue liquid diet and obtaine esophagram prior to above.    REVIEW OF SYSTEMS:    CONSTITUTIONAL: No fever, weight loss, or fatigue  EYES: No eye pain, visual disturbances, or discharge  ENMT:  No difficulty hearing, tinnitus, vertigo; No sinus or throat pain  RESPIRATORY: No cough, wheezing, chills or hemoptysis; No shortness of breath  CARDIOVASCULAR: No chest pain, palpitations, dizziness, or leg swelling  GASTROINTESTINAL: No abdominal or epigastric pain. No nausea, vomiting, or hematemesis; No diarrhea or constipation. No melena or hematochezia.    MEDICATIONS:  MEDICATIONS  (STANDING):  amLODIPine   Tablet 5 milliGRAM(s) Oral daily  enoxaparin Injectable 40 milliGRAM(s) SubCutaneous daily  ferrous    sulfate 325 milliGRAM(s) Oral three times a day  levothyroxine 25 MICROGram(s) Oral daily  pantoprazole  Injectable 40 milliGRAM(s) IV Push daily  simvastatin 10 milliGRAM(s) Oral at bedtime  sodium chloride 0.9%. 1000 milliLiter(s) (100 mL/Hr) IV Continuous <Continuous>    MEDICATIONS  (PRN):      Allergies    No Known Allergies    Intolerances        Vital Signs Last 24 Hrs  T(C): 36.8 (2018 05:45), Max: 37.1 (2018 17:20)  T(F): 98.3 (2018 05:45), Max: 98.7 (2018 17:20)  HR: 52 (2018 05:45) (52 - 64)  BP: 134/64 (2018 05:45) (134/64 - 141/64)  BP(mean): --  RR: 18 (2018 05:45) (18 - 18)  SpO2: 96% (2018 05:45) (96% - 100%)    06-20 @ 07:01  -  06-21 @ 07:00  --------------------------------------------------------  IN: 2200 mL / OUT: 0 mL / NET: 2200 mL        PHYSICAL EXAM:    General: Well developed; well nourished; in no acute distress  HEENT: MMM, conjunctiva and sclera clear, dofrmed mandible area from prior surgery  Gastrointestinal:Abdomen: Soft non-tender non-distended; Normal bowel sounds; No hepatosplenomegaly  Extremities: no cyanosis, clubbing or edema.  Skin: Warm and dry. No obvious rash    LABS:                    Urinalysis Basic - ( 2018 12:04 )    Color: Yellow / Appearance: Clear / S.025 / pH: x  Gluc: x / Ketone: Moderate  / Bili: Negative / Urobili: Negative mg/dL   Blood: x / Protein: 15 mg/dL / Nitrite: Negative   Leuk Esterase: Negative / RBC: x / WBC x   Sq Epi: x / Non Sq Epi: Occasional / Bacteria: x                RADIOLOGY & ADDITIONAL STUDIES (The following images were personally reviewed):  < from: CT Abdomen and Pelvis w/ IV Cont (18 @ 14:23) >   EXAM:  CT ABDOMEN AND PELVIS IC                         EXAM:  CT CHEST IC                          PROCEDURE DATE:  2018          INTERPRETATION:  CT CHEST IC, CT ABDOMEN AND PELVIS IC    HISTORY:  mass at GE junction, squamous cell carcinoma, dysphagia    Technique: CT of the chest, abdomen, and pelvis is performed without oral   with intravenous contrast. Axial images are supplemented with coronal and   sagittal reformations. This study was performed using automatic exposure   control(radiation dose reduction software) to obtain a diagnostic image   quality scan with patient dose as low as reasonably achievable.    Contrast: 94 cc Omnipaque 300    Comparison: Esophagram 2017, CT chest/abdomen/pelvis 2014    Findings:    LUNGS, AIRWAYS: The central airways are patent. Stable biapical scarring.   Left lower lobe tree-in-bud opacities representing endobronchial spread   of infection.  PLEURA: Trace bilateral pleural effusions.  HEART AND VESSELS: Normal heart size. Nopericardial effusion. Coronary   artery calcifications are present. Normal caliber thoracic aorta.  Aortic atherosclerosis. Main pulmonary arteries are patent.    MEDIASTINUM AND LILLIE: 2.1 cm short axis subcarinal lymph node (3; 73).   There is circumferential thickening of the mid to lower esophagus   measuring 3.7 x 2.9 cm (3; 68) over a 7.9 cm length, corresponds to   irregular stricture on esophagram.    CHEST WALL: No masses.    LIVER: Stable left hepatic cyst. No evidence of metastatic lesion.  SPLEEN: Multiple granulomas.  PANCREAS: Normal.  GALLBLADDER/BILIARY TREE: Nondilated. Normal gallbladder.  ADRENALS: Stable small right adrenal nodule since 2014.  KIDNEYS: No calcification, hydronephrosis, or soft tissue attenuating   renal mass.    LYMPHADENOPATHY/RETROPERITONEUM: Gastrohepatic lymphadenopathy measuring   1.8 cm and 2.2 cm short axis (8; 17, 25).    VASCULATURE: Aortoiliac atherosclerosis without aneurysm.  BOWEL: No bowel-related abnormality. Specifically, no bowel obstruction.  PELVIC VISCERA: Unremarkable prostate, seminal vesicles, and urinary   bladder.  PELVIC LYMPH NODES: No pelvic adenopathy.  PERITONEUM/ABDOMINAL WALL: No ascites or implants.  SKELETAL: No aggressive lesion.    IMPRESSION:     Circumferential thickening of the mid to lower esophagus over a 7.9 cm in   length corresponding to neoplasm on esophagram.    Subcarinal and gastrohepatic metastatic lymphadenopathy.    No other sites of disease in the chest, abdomen, or pelvis.                GILLIAN ESPINAL, ATTENDING RADIOLOGIST  This document has been electronically signed. 2018  4:06PM                  < end of copied text >

## 2018-06-21 NOTE — PROGRESS NOTE ADULT - PROBLEM SELECTOR PLAN 1
esophageal CA - CT C/A/P to restage, stent vs J tube placement? esophageal CA - CT C/A/P with thickening of mid to lower esophagus over 7.9cm GI following.  stent vs peg placement  Continue Clear liquid diet.

## 2018-06-21 NOTE — PROGRESS NOTE ADULT - SUBJECTIVE AND OBJECTIVE BOX
CC: dysphagia    HPI:  72 y/o male with pmh of htn, hld presents from home with difficulty swallowing which has gotten progressively got worse and now can only tolerate liquids. Patient is a very poor historian and even using a , patient   states he was just diagnosed with the esophageal cancer recently, but the records show he was diagnosed 7 years prior. Patient also complains of weight loss, lethargy and chest discomfort.  Patient denies having an oncologist and is  unaware of what meds he takes. Spoke to GI who states he had recent outpatient workup and had a ct and egd. Patient admitted for failure to thrive.    INTERVAL HPI/OVERNIGHT EVENTS: Patient seen and examined lying in bed with  on the phone - #[phone].  Patient denies any headache, dizziness, SOB, CP, abdominal pain, nausea, vomiting, dysuria.  Tolerating PO liquids.    Vital Signs Last 24 Hrs  T(C): 36.7 (21 Jun 2018 07:52), Max: 37.1 (20 Jun 2018 17:20)  T(F): 98 (21 Jun 2018 07:52), Max: 98.7 (20 Jun 2018 17:20)  HR: 55 (21 Jun 2018 07:52) (52 - 64)  BP: 136/65 (21 Jun 2018 07:52) (134/64 - 141/64)  BP(mean): --  RR: 18 (21 Jun 2018 07:52) (18 - 18)  SpO2: 97% (21 Jun 2018 07:52) (96% - 99%)  I&O's Detail    20 Jun 2018 07:01  -  21 Jun 2018 07:00  --------------------------------------------------------  IN:    sodium chloride 0.9%.: 2200 mL  Total IN: 2200 mL    OUT:  Total OUT: 0 mL    Total NET: 2200 mL      PHYSICAL EXAM:  GENERAL: NAD, cachetic  HEAD:  Atraumatic, Normocephalic  NECK: Supple, No JVD, Normal thyroid  NERVOUS SYSTEM:  Alert & Oriented X3, Good concentration; Motor Strength 5/5 B/L upper and lower extremities  CHEST/LUNG: Clear to auscultation bilaterally; No rales, rhonchi, wheezing, or rubs  HEART: Regular rate and rhythm; No murmurs, rubs, or gallops  ABDOMEN: Soft, Nontender, Nondistended; Bowel sounds present  EXTREMITIES:  2+ Peripheral Pulses, No clubbing, cyanosis, or edema                                10.2   8.4   )-----------( 252      ( 21 Jun 2018 06:50 )             31.5     21 Jun 2018 06:50    141    |  104    |  5.0    ----------------------------<  101    4.1     |  25.0   |  0.79     Ca    8.7        21 Jun 2018 06:50        MEDICATIONS  (STANDING):  amLODIPine   Tablet 5 milliGRAM(s) Oral daily  enoxaparin Injectable 40 milliGRAM(s) SubCutaneous daily  ferrous    sulfate 325 milliGRAM(s) Oral three times a day  levothyroxine 25 MICROGram(s) Oral daily  pantoprazole  Injectable 40 milliGRAM(s) IV Push daily  simvastatin 10 milliGRAM(s) Oral at bedtime  sodium chloride 0.9%. 1000 milliLiter(s) (100 mL/Hr) IV Continuous <Continuous>    MEDICATIONS  (PRN):      RADIOLOGY & ADDITIONAL TESTS:  < from: CT Abdomen and Pelvis w/ IV Cont (06.20.18 @ 14:23) >     EXAM:  CT ABDOMEN AND PELVIS IC                         EXAM:  CT CHEST IC                          PROCEDURE DATE:  06/20/2018          INTERPRETATION:  CT CHEST IC, CT ABDOMEN AND PELVIS IC    HISTORY:  mass at GE junction, squamous cell carcinoma, dysphagia    Technique: CT of the chest, abdomen, and pelvis is performed without oral   with intravenous contrast. Axial images are supplemented with coronal and   sagittal reformations. This study was performed using automatic exposure   control(radiation dose reduction software) to obtain a diagnostic image   quality scan with patient dose as low as reasonably achievable.    Contrast: 94 cc Omnipaque 300    Comparison: Esophagram 11/29/2017, CT chest/abdomen/pelvis 5/22/2014    Findings:    LUNGS, AIRWAYS: The central airways are patent. Stable biapical scarring.   Left lower lobe tree-in-bud opacities representing endobronchial spread   of infection.  PLEURA: Trace bilateral pleural effusions.  HEART AND VESSELS: Normal heart size. Nopericardial effusion. Coronary   artery calcifications are present. Normal caliber thoracic aorta.  Aortic atherosclerosis. Main pulmonary arteries are patent.    MEDIASTINUM AND LILLIE: 2.1 cm short axis subcarinal lymph node (3; 73).   There is circumferential thickening of the mid to lower esophagus   measuring 3.7 x 2.9 cm (3; 68) over a 7.9 cm length, corresponds to   irregular stricture on esophagram.    CHEST WALL: No masses.    LIVER: Stable left hepatic cyst. No evidence of metastatic lesion.  SPLEEN: Multiple granulomas.  PANCREAS: Normal.  GALLBLADDER/BILIARY TREE: Nondilated. Normal gallbladder.  ADRENALS: Stable small right adrenal nodule since 2014.  KIDNEYS: No calcification, hydronephrosis, or soft tissue attenuating   renal mass.    LYMPHADENOPATHY/RETROPERITONEUM: Gastrohepatic lymphadenopathy measuring   1.8 cm and 2.2 cm short axis (8; 17, 25).    VASCULATURE: Aortoiliac atherosclerosis without aneurysm.  BOWEL: No bowel-related abnormality. Specifically, no bowel obstruction.  PELVIC VISCERA: Unremarkable prostate, seminal vesicles, and urinary   bladder.  PELVIC LYMPH NODES: No pelvic adenopathy.  PERITONEUM/ABDOMINAL WALL: No ascites or implants.  SKELETAL: No aggressive lesion.    IMPRESSION:     Circumferential thickening of the mid to lower esophagus over a 7.9 cm in   length corresponding to neoplasm on esophagram.    Subcarinal and gastrohepatic metastatic lymphadenopathy.    No other sites of disease in the chest, abdomen, or pelvis.                GILLIAN ESPINAL, ATTENDING RADIOLOGIST  This document has been electronically signed. Jun 20 2018  4:06PM                < end of copied text >          < from: Xray Esophagram (06.21.18 @ 13:16) >     EXAM:  ESOPHOGRAM                          PROCEDURE DATE:  06/21/2018          INTERPRETATION:  CLINICAL INDICATION: Dysphasia TECHNIQUE: Esophagram.     FINDINGS: The patient was given oral contrast (barium) by mouth.   The swallowing reflex is normal. Fluoroscopic and film examination shows   mid esophageal carcinoma with a shelflike partial obstruction of the   proximal esophagus which is distended. The carcinoma extends from mid   esophagus distally 8-cm. The distal esophagus GE junction.Is severe   narrowing of the lumen of the esophagus maximal diameter measuring 7 mm   and tightest stricture measuring 3.2 mm.   Impression:   8 cm on distal esophageal carcinoma as described.                CLOVER LAINEZ M.D., ATTENDING RADIOLOGIST  This document has been electronically signed. Jun 21 2018  2:17PM              < end of copied text > CC: dysphagia    HPI: 74 y/o male with pmh of htn, hld presents from home with difficulty swallowing which has gotten progressively got worse and now can only tolerate liquids. Patient is a very poor historian and even using a , patient   states he was just diagnosed with the esophageal cancer recently, but the records show he was diagnosed 7 years prior. Patient also complains of weight loss, lethargy and chest discomfort.  Patient denies having an oncologist and is  unaware of what meds he takes. Spoke to GI who states he had recent outpatient workup and had a ct and egd. Patient admitted for failure to thrive.    INTERVAL HPI/OVERNIGHT EVENTS: Patient seen and examined lying in bed with  on the phone - #[phone].  Patient denies any headache, dizziness, SOB, CP, abdominal pain, nausea, vomiting, dysuria.  Tolerating PO liquids.    Vital Signs Last 24 Hrs  T(C): 36.7 (21 Jun 2018 07:52), Max: 37.1 (20 Jun 2018 17:20)  T(F): 98 (21 Jun 2018 07:52), Max: 98.7 (20 Jun 2018 17:20)  HR: 55 (21 Jun 2018 07:52) (52 - 64)  BP: 136/65 (21 Jun 2018 07:52) (134/64 - 141/64)  BP(mean): --  RR: 18 (21 Jun 2018 07:52) (18 - 18)  SpO2: 97% (21 Jun 2018 07:52) (96% - 99%)  I&O's Detail    20 Jun 2018 07:01  -  21 Jun 2018 07:00  --------------------------------------------------------  IN:    sodium chloride 0.9%.: 2200 mL  Total IN: 2200 mL    OUT:  Total OUT: 0 mL    Total NET: 2200 mL      PHYSICAL EXAM:  GENERAL: NAD, cachetic  HEAD:  Atraumatic, Normocephalic  NECK: Supple, No JVD, Normal thyroid  NERVOUS SYSTEM:  Alert & Oriented X3, Good concentration; Motor Strength 5/5 B/L upper and lower extremities  CHEST/LUNG: Clear to auscultation bilaterally; No rales, rhonchi, wheezing, or rubs  HEART: Regular rate and rhythm; No murmurs, rubs, or gallops  ABDOMEN: Soft, Nontender, Nondistended; Bowel sounds present  EXTREMITIES:  2+ Peripheral Pulses, No clubbing, cyanosis, or edema                                10.2   8.4   )-----------( 252      ( 21 Jun 2018 06:50 )             31.5     21 Jun 2018 06:50    141    |  104    |  5.0    ----------------------------<  101    4.1     |  25.0   |  0.79     Ca    8.7        21 Jun 2018 06:50        MEDICATIONS  (STANDING):  amLODIPine   Tablet 5 milliGRAM(s) Oral daily  enoxaparin Injectable 40 milliGRAM(s) SubCutaneous daily  ferrous    sulfate 325 milliGRAM(s) Oral three times a day  levothyroxine 25 MICROGram(s) Oral daily  pantoprazole  Injectable 40 milliGRAM(s) IV Push daily  simvastatin 10 milliGRAM(s) Oral at bedtime  sodium chloride 0.9%. 1000 milliLiter(s) (100 mL/Hr) IV Continuous <Continuous>    MEDICATIONS  (PRN):      RADIOLOGY & ADDITIONAL TESTS:  < from: CT Abdomen and Pelvis w/ IV Cont (06.20.18 @ 14:23) >     EXAM:  CT ABDOMEN AND PELVIS IC                         EXAM:  CT CHEST IC                          PROCEDURE DATE:  06/20/2018          INTERPRETATION:  CT CHEST IC, CT ABDOMEN AND PELVIS IC    HISTORY:  mass at GE junction, squamous cell carcinoma, dysphagia    Technique: CT of the chest, abdomen, and pelvis is performed without oral   with intravenous contrast. Axial images are supplemented with coronal and   sagittal reformations. This study was performed using automatic exposure   control(radiation dose reduction software) to obtain a diagnostic image   quality scan with patient dose as low as reasonably achievable.    Contrast: 94 cc Omnipaque 300    Comparison: Esophagram 11/29/2017, CT chest/abdomen/pelvis 5/22/2014    Findings:    LUNGS, AIRWAYS: The central airways are patent. Stable biapical scarring.   Left lower lobe tree-in-bud opacities representing endobronchial spread   of infection.  PLEURA: Trace bilateral pleural effusions.  HEART AND VESSELS: Normal heart size. Nopericardial effusion. Coronary   artery calcifications are present. Normal caliber thoracic aorta.  Aortic atherosclerosis. Main pulmonary arteries are patent.    MEDIASTINUM AND LILLIE: 2.1 cm short axis subcarinal lymph node (3; 73).   There is circumferential thickening of the mid to lower esophagus   measuring 3.7 x 2.9 cm (3; 68) over a 7.9 cm length, corresponds to   irregular stricture on esophagram.    CHEST WALL: No masses.    LIVER: Stable left hepatic cyst. No evidence of metastatic lesion.  SPLEEN: Multiple granulomas.  PANCREAS: Normal.  GALLBLADDER/BILIARY TREE: Nondilated. Normal gallbladder.  ADRENALS: Stable small right adrenal nodule since 2014.  KIDNEYS: No calcification, hydronephrosis, or soft tissue attenuating   renal mass.    LYMPHADENOPATHY/RETROPERITONEUM: Gastrohepatic lymphadenopathy measuring   1.8 cm and 2.2 cm short axis (8; 17, 25).    VASCULATURE: Aortoiliac atherosclerosis without aneurysm.  BOWEL: No bowel-related abnormality. Specifically, no bowel obstruction.  PELVIC VISCERA: Unremarkable prostate, seminal vesicles, and urinary   bladder.  PELVIC LYMPH NODES: No pelvic adenopathy.  PERITONEUM/ABDOMINAL WALL: No ascites or implants.  SKELETAL: No aggressive lesion.    IMPRESSION:     Circumferential thickening of the mid to lower esophagus over a 7.9 cm in   length corresponding to neoplasm on esophagram.    Subcarinal and gastrohepatic metastatic lymphadenopathy.    No other sites of disease in the chest, abdomen, or pelvis.                GILLIAN ESPINAL, ATTENDING RADIOLOGIST  This document has been electronically signed. Jun 20 2018  4:06PM                < end of copied text >          < from: Xray Esophagram (06.21.18 @ 13:16) >     EXAM:  ESOPHOGRAM                          PROCEDURE DATE:  06/21/2018          INTERPRETATION:  CLINICAL INDICATION: Dysphasia TECHNIQUE: Esophagram.     FINDINGS: The patient was given oral contrast (barium) by mouth.   The swallowing reflex is normal. Fluoroscopic and film examination shows   mid esophageal carcinoma with a shelflike partial obstruction of the   proximal esophagus which is distended. The carcinoma extends from mid   esophagus distally 8-cm. The distal esophagus GE junction.Is severe   narrowing of the lumen of the esophagus maximal diameter measuring 7 mm   and tightest stricture measuring 3.2 mm.   Impression:   8 cm on distal esophageal carcinoma as described.                CLOVER LAINEZ M.D., ATTENDING RADIOLOGIST  This document has been electronically signed. Jun 21 2018  2:17PM              < end of copied text >

## 2018-06-22 DIAGNOSIS — R13.14 DYSPHAGIA, PHARYNGOESOPHAGEAL PHASE: ICD-10-CM

## 2018-06-22 LAB
ANION GAP SERPL CALC-SCNC: 11 MMOL/L — SIGNIFICANT CHANGE UP (ref 5–17)
BUN SERPL-MCNC: 5 MG/DL — LOW (ref 8–20)
CALCIUM SERPL-MCNC: 8.7 MG/DL — SIGNIFICANT CHANGE UP (ref 8.6–10.2)
CHLORIDE SERPL-SCNC: 104 MMOL/L — SIGNIFICANT CHANGE UP (ref 98–107)
CO2 SERPL-SCNC: 28 MMOL/L — SIGNIFICANT CHANGE UP (ref 22–29)
CREAT SERPL-MCNC: 0.7 MG/DL — SIGNIFICANT CHANGE UP (ref 0.5–1.3)
GLUCOSE SERPL-MCNC: 93 MG/DL — SIGNIFICANT CHANGE UP (ref 70–115)
HCT VFR BLD CALC: 30.2 % — LOW (ref 42–52)
HGB BLD-MCNC: 9.6 G/DL — LOW (ref 14–18)
MCHC RBC-ENTMCNC: 26.8 PG — LOW (ref 27–31)
MCHC RBC-ENTMCNC: 31.8 G/DL — LOW (ref 32–36)
MCV RBC AUTO: 84.4 FL — SIGNIFICANT CHANGE UP (ref 80–94)
PLATELET # BLD AUTO: 301 K/UL — SIGNIFICANT CHANGE UP (ref 150–400)
POTASSIUM SERPL-MCNC: 3.7 MMOL/L — SIGNIFICANT CHANGE UP (ref 3.5–5.3)
POTASSIUM SERPL-SCNC: 3.7 MMOL/L — SIGNIFICANT CHANGE UP (ref 3.5–5.3)
RBC # BLD: 3.58 M/UL — LOW (ref 4.6–6.2)
RBC # FLD: 13.3 % — SIGNIFICANT CHANGE UP (ref 11–15.6)
SODIUM SERPL-SCNC: 143 MMOL/L — SIGNIFICANT CHANGE UP (ref 135–145)
WBC # BLD: 9.4 K/UL — SIGNIFICANT CHANGE UP (ref 4.8–10.8)
WBC # FLD AUTO: 9.4 K/UL — SIGNIFICANT CHANGE UP (ref 4.8–10.8)

## 2018-06-22 PROCEDURE — 99232 SBSQ HOSP IP/OBS MODERATE 35: CPT

## 2018-06-22 RX ADMIN — Medication 325 MILLIGRAM(S): at 21:52

## 2018-06-22 RX ADMIN — PANTOPRAZOLE SODIUM 40 MILLIGRAM(S): 20 TABLET, DELAYED RELEASE ORAL at 12:39

## 2018-06-22 RX ADMIN — ENOXAPARIN SODIUM 40 MILLIGRAM(S): 100 INJECTION SUBCUTANEOUS at 12:39

## 2018-06-22 RX ADMIN — SODIUM CHLORIDE 100 MILLILITER(S): 9 INJECTION INTRAMUSCULAR; INTRAVENOUS; SUBCUTANEOUS at 10:58

## 2018-06-22 RX ADMIN — Medication 325 MILLIGRAM(S): at 05:08

## 2018-06-22 RX ADMIN — SIMVASTATIN 10 MILLIGRAM(S): 20 TABLET, FILM COATED ORAL at 21:52

## 2018-06-22 RX ADMIN — AMLODIPINE BESYLATE 5 MILLIGRAM(S): 2.5 TABLET ORAL at 05:08

## 2018-06-22 RX ADMIN — SODIUM CHLORIDE 100 MILLILITER(S): 9 INJECTION INTRAMUSCULAR; INTRAVENOUS; SUBCUTANEOUS at 05:08

## 2018-06-22 RX ADMIN — Medication 325 MILLIGRAM(S): at 16:25

## 2018-06-22 RX ADMIN — SODIUM CHLORIDE 100 MILLILITER(S): 9 INJECTION INTRAMUSCULAR; INTRAVENOUS; SUBCUTANEOUS at 21:50

## 2018-06-22 RX ADMIN — Medication 25 MICROGRAM(S): at 05:08

## 2018-06-22 NOTE — PROGRESS NOTE ADULT - PROBLEM SELECTOR PLAN 1
esophageal CA - CT C/A/P with thickening of mid to lower esophagus over 7.9cm   GI following.  For EGD with possible dilation and /or stent placement on Monday.   Continue Clear liquid diet. esophageal CA - CT C/A/P with thickening of mid to lower esophagus over 7.9cm   GI following.  For EGD with possible dilation and /or stent placement on Monday - if fails, will need PEG.   Continue Clear liquid diet.

## 2018-06-22 NOTE — PROGRESS NOTE ADULT - SUBJECTIVE AND OBJECTIVE BOX
Pt seen and examined f/u dysphagia due to squamous cell cancer of esophagus  This morning he has no new complaints and continues to tolerate liquids. Esophogram reveals the long segment 8cm tumor with significant narrowing or esophageal lumen as noted below.     REVIEW OF SYSTEMS:    CONSTITUTIONAL: No fever, weight loss, or fatigue  EYES: No eye pain, visual disturbances, or discharge  ENMT:  No difficulty hearing, tinnitus, vertigo; No sinus or throat pain  RESPIRATORY: No cough, wheezing, chills or hemoptysis; No shortness of breath  CARDIOVASCULAR: No chest pain, palpitations, dizziness, or leg swelling  GASTROINTESTINAL: as aboe    MEDICATIONS:  MEDICATIONS  (STANDING):  amLODIPine   Tablet 5 milliGRAM(s) Oral daily  enoxaparin Injectable 40 milliGRAM(s) SubCutaneous daily  ferrous    sulfate 325 milliGRAM(s) Oral three times a day  levothyroxine 25 MICROGram(s) Oral daily  pantoprazole  Injectable 40 milliGRAM(s) IV Push daily  simvastatin 10 milliGRAM(s) Oral at bedtime  sodium chloride 0.9%. 1000 milliLiter(s) (100 mL/Hr) IV Continuous <Continuous>    MEDICATIONS  (PRN):      Allergies    No Known Allergies    Intolerances        Vital Signs Last 24 Hrs  T(C): 37.1 (22 Jun 2018 07:53), Max: 37.2 (22 Jun 2018 05:06)  T(F): 98.8 (22 Jun 2018 07:53), Max: 98.9 (22 Jun 2018 05:06)  HR: 53 (22 Jun 2018 07:53) (53 - 64)  BP: 133/59 (22 Jun 2018 07:53) (133/59 - 153/67)  BP(mean): --  RR: 18 (22 Jun 2018 07:53) (18 - 18)  SpO2: 97% (22 Jun 2018 07:53) (96% - 97%)    06-21 @ 07:01  -  06-22 @ 07:00  --------------------------------------------------------  IN: 1200 mL / OUT: 0 mL / NET: 1200 mL        PHYSICAL EXAM:    General: Well developed; well nourished; in no acute distress  HEENT: MMM, conjunctiva and sclera clear, deformed postsurgical changes of mandible.  Gastrointestinal:Abdomen: Soft non-tender non-distended; Normal bowel sounds; No hepatosplenomegaly  Extremities: no cyanosis, clubbing or edema.  Skin: Warm and dry. No obvious rash    LABS:      CBC Full  -  ( 22 Jun 2018 06:10 )  WBC Count : 9.4 K/uL  Hemoglobin : 9.6 g/dL  Hematocrit : 30.2 %  Platelet Count - Automated : 301 K/uL  Mean Cell Volume : 84.4 fl  Mean Cell Hemoglobin : 26.8 pg  Mean Cell Hemoglobin Concentration : 31.8 g/dL  Auto Neutrophil # : x  Auto Lymphocyte # : x  Auto Monocyte # : x  Auto Eosinophil # : x  Auto Basophil # : x  Auto Neutrophil % : x  Auto Lymphocyte % : x  Auto Monocyte % : x  Auto Eosinophil % : x  Auto Basophil % : x    06-22    143  |  104  |  5.0<L>  ----------------------------<  93  3.7   |  28.0  |  0.70    Ca    8.7      22 Jun 2018 06:10                        RADIOLOGY & ADDITIONAL STUDIES (The following images were personally reviewed):  < from: Xray Esophagram (06.21.18 @ 13:16) >   EXAM:  ESOPHOGRAM                          PROCEDURE DATE:  06/21/2018          INTERPRETATION:  CLINICAL INDICATION: Dysphasia TECHNIQUE: Esophagram.     FINDINGS: The patient was given oral contrast (barium) by mouth.   The swallowing reflex is normal. Fluoroscopic and film examination shows   mid esophageal carcinoma with a shelflike partial obstruction of the   proximal esophagus which is distended. The carcinoma extends from mid   esophagus distally 8-cm. The distal esophagus GE junction.Is severe   narrowing of the lumen of the esophagus maximal diameter measuring 7 mm   and tightest stricture measuring 3.2 mm.   Impression:   8 cm on distal esophageal carcinoma as described.                CLOVER LAINEZ M.D., ATTENDING RADIOLOGIST  This document has been electronically signed. Jun 21 2018  2:17PM          < end of copied text >

## 2018-06-22 NOTE — PROGRESS NOTE ADULT - SUBJECTIVE AND OBJECTIVE BOX
The patient is a 73y old male who presents with a complaint of esophageal cancer.  He is  scheduled to have an EGD WITH POSSIBLE DILATION, POSSIBLE STENT REPLACEMENT.    PAST MEDICAL HISTORY:  Head & Neck cancer  High cholesterol  Hypothyroid  Hypertension    PAST SURGICAL HISTORY:  History of head and neck cancer surgery in 2007  Cholecystectomy 2 years ago    MEDICATIONS  (STANDING):  amLODIPine   Tablet 5 milliGRAM(s) Oral daily  enoxaparin Injectable 40 milliGRAM(s) SubCutaneous daily  ferrous    sulfate 325 milliGRAM(s) Oral three times a day  levothyroxine 25 MICROGram(s) Oral daily  pantoprazole  Injectable 40 milliGRAM(s) IV Push daily  simvastatin 10 milliGRAM(s) Oral at bedtime  sodium chloride 0.9%. 1000 milliLiter(s) (100 mL/Hr) IV Continuous <Continuous>    Allergies:     No Known Drug Allergies      SOCIAL HISTORY:  He stopped drinking alcohol in 1999.  He never smoked or used illicit drugs.                        9.6    9.4   )-----------( 301      ( 22 Jun 2018 06:10 )             30.2     PT/INR - ( 18 Jun 2018 08:25 )   PT: 13.5 sec;   INR: 1.22 ratio       PTT - ( 18 Jun 2018 08:25 )  PTT:30.9 sec    06-22    143  |  104  |  5.0<L>  ----------------------------<  93  3.7   |  28.0  |  0.70    Ca    8.7      22 Jun 2018 06:10    EKG:  -  6/18/2018   Sinus bradycardia  Otherwise normal ECG    TT ECHO:  None    CT ABDOMEN  - 6/20/2018  IMPRESSION:   Circumferential thickening of the mid to lower esophagus over a 7.9 cm in   length corresponding to neoplasm on esophagram.  Subcarinal and gastrohepatic metastatic lymphadenopathy.  Surgical Final Report  3: Esophagus, biopsy: Infiltrating squamous cell carcinoma,  moderately to poorly differentiated with extensive tumor  necrosis.    ASA # = 4  Mallampati # = 1

## 2018-06-22 NOTE — PROGRESS NOTE ADULT - ASSESSMENT
74 y/o male with PMHx of HTN, HLD, recent diagnosis of esophageal CA p/w difficulty swallowing associated with tolerance of liquids only, weight loss, lethargy and chest discomfort. CT chest/abd/pelvis with thickening of mid to lower esophagus over 7.9cm; and subcarinal and gastrohepatic metastatic lymphadenopathy.  GI consulted and recommending stent vs peg.

## 2018-06-22 NOTE — PROGRESS NOTE ADULT - SUBJECTIVE AND OBJECTIVE BOX
CC: Dysphagia    HPI:  72 y/o male with pmh of htn, hld presents from home with difficulty swallowing which has gotten progressively got worse and now can only tolerate liquids. Patient is a very poor historian and even using a , patient   states he was just diagnosed with the esophageal cancer recently, but the records show he was diagnosed 7 years prior. Patient also complains of weight loss, lethargy and chest discomfort.  Patient denies having an oncologist and is unaware of what meds he takes. Spoke to GI who states he had recent outpatient workup and had a ct and egd. Patient admitted for failure to thrive.    INTERVAL HPI/OVERNIGHT EVENTS: Patient seen and examined lying in bed.  Patient denies any headache, dizziness, SOB, CP, abdominal pain, nausea, vomiting, dysuria.  Tolerating PO liquids.  Other ROS reviewed and are negative.    Vital Signs Last 24 Hrs  T(C): 37.1 (22 Jun 2018 07:53), Max: 37.2 (22 Jun 2018 05:06)  T(F): 98.8 (22 Jun 2018 07:53), Max: 98.9 (22 Jun 2018 05:06)  HR: 53 (22 Jun 2018 07:53) (53 - 64)  BP: 133/59 (22 Jun 2018 07:53) (133/59 - 153/67)  BP(mean): --  RR: 18 (22 Jun 2018 07:53) (18 - 18)  SpO2: 97% (22 Jun 2018 07:53) (96% - 97%)  I&O's Detail    21 Jun 2018 07:01  -  22 Jun 2018 07:00  --------------------------------------------------------  IN:    sodium chloride 0.9%.: 1200 mL  Total IN: 1200 mL    OUT:  Total OUT: 0 mL    Total NET: 1200 mL      PHYSICAL EXAM:  GENERAL: NAD, cachetic  HEAD:  Atraumatic, Normocephalic  NECK: Supple, No JVD, Normal thyroid  NERVOUS SYSTEM:  Alert & Oriented X3, Good concentration; Motor Strength 5/5 B/L upper and lower extremities  CHEST/LUNG: Clear to auscultation bilaterally; No rales, rhonchi, wheezing, or rubs  HEART: Regular rate and rhythm; No murmurs, rubs, or gallops  ABDOMEN: Soft, Nontender, Nondistended; Bowel sounds present  EXTREMITIES:  2+ Peripheral Pulses, No clubbing, cyanosis, or edema                                9.6    9.4   )-----------( 301      ( 22 Jun 2018 06:10 )             30.2     22 Jun 2018 06:10    143    |  104    |  5.0    ----------------------------<  93     3.7     |  28.0   |  0.70     Ca    8.7        22 Jun 2018 06:10        MEDICATIONS  (STANDING):  amLODIPine   Tablet 5 milliGRAM(s) Oral daily  enoxaparin Injectable 40 milliGRAM(s) SubCutaneous daily  ferrous    sulfate 325 milliGRAM(s) Oral three times a day  levothyroxine 25 MICROGram(s) Oral daily  pantoprazole  Injectable 40 milliGRAM(s) IV Push daily  simvastatin 10 milliGRAM(s) Oral at bedtime  sodium chloride 0.9%. 1000 milliLiter(s) (100 mL/Hr) IV Continuous <Continuous>    MEDICATIONS  (PRN):      RADIOLOGY & ADDITIONAL TESTS:  < from: Xray Esophagram (06.21.18 @ 13:16) >   EXAM:  ESOPHOGRAM                          PROCEDURE DATE:  06/21/2018          INTERPRETATION:  CLINICAL INDICATION: Dysphasia TECHNIQUE: Esophagram.     FINDINGS: The patient was given oral contrast (barium) by mouth.   The swallowing reflex is normal. Fluoroscopic and film examination shows   mid esophageal carcinoma with a shelflike partial obstruction of the   proximal esophagus which is distended. The carcinoma extends from mid   esophagus distally 8-cm. The distal esophagus GE junction.Is severe   narrowing of the lumen of the esophagus maximal diameter measuring 7 mm   and tightest stricture measuring 3.2 mm.   Impression:   8 cm on distal esophageal carcinoma as described.                CLOVER LAINEZ M.D., ATTENDING RADIOLOGIST  This document has been electronically signed. Jun 21 2018  2:17PM              < end of copied text >

## 2018-06-23 LAB
ANION GAP SERPL CALC-SCNC: 9 MMOL/L — SIGNIFICANT CHANGE UP (ref 5–17)
BUN SERPL-MCNC: 4 MG/DL — LOW (ref 8–20)
CALCIUM SERPL-MCNC: 8.4 MG/DL — LOW (ref 8.6–10.2)
CHLORIDE SERPL-SCNC: 105 MMOL/L — SIGNIFICANT CHANGE UP (ref 98–107)
CO2 SERPL-SCNC: 28 MMOL/L — SIGNIFICANT CHANGE UP (ref 22–29)
CREAT SERPL-MCNC: 0.74 MG/DL — SIGNIFICANT CHANGE UP (ref 0.5–1.3)
GLUCOSE SERPL-MCNC: 94 MG/DL — SIGNIFICANT CHANGE UP (ref 70–115)
HCT VFR BLD CALC: 30 % — LOW (ref 42–52)
HGB BLD-MCNC: 9.6 G/DL — LOW (ref 14–18)
INR BLD: 1.33 RATIO — HIGH (ref 0.88–1.16)
MCHC RBC-ENTMCNC: 26.8 PG — LOW (ref 27–31)
MCHC RBC-ENTMCNC: 32 G/DL — SIGNIFICANT CHANGE UP (ref 32–36)
MCV RBC AUTO: 83.8 FL — SIGNIFICANT CHANGE UP (ref 80–94)
PLATELET # BLD AUTO: 308 K/UL — SIGNIFICANT CHANGE UP (ref 150–400)
POTASSIUM SERPL-MCNC: 3.8 MMOL/L — SIGNIFICANT CHANGE UP (ref 3.5–5.3)
POTASSIUM SERPL-SCNC: 3.8 MMOL/L — SIGNIFICANT CHANGE UP (ref 3.5–5.3)
PROTHROM AB SERPL-ACNC: 14.7 SEC — HIGH (ref 9.8–12.7)
RBC # BLD: 3.58 M/UL — LOW (ref 4.6–6.2)
RBC # FLD: 13.5 % — SIGNIFICANT CHANGE UP (ref 11–15.6)
SODIUM SERPL-SCNC: 142 MMOL/L — SIGNIFICANT CHANGE UP (ref 135–145)
WBC # BLD: 9 K/UL — SIGNIFICANT CHANGE UP (ref 4.8–10.8)
WBC # FLD AUTO: 9 K/UL — SIGNIFICANT CHANGE UP (ref 4.8–10.8)

## 2018-06-23 PROCEDURE — 99232 SBSQ HOSP IP/OBS MODERATE 35: CPT | Mod: GC

## 2018-06-23 PROCEDURE — 99232 SBSQ HOSP IP/OBS MODERATE 35: CPT

## 2018-06-23 RX ORDER — SODIUM CHLORIDE 9 MG/ML
1000 INJECTION, SOLUTION INTRAVENOUS
Qty: 0 | Refills: 0 | Status: DISCONTINUED | OUTPATIENT
Start: 2018-06-23 | End: 2018-06-28

## 2018-06-23 RX ADMIN — AMLODIPINE BESYLATE 5 MILLIGRAM(S): 2.5 TABLET ORAL at 05:11

## 2018-06-23 RX ADMIN — ENOXAPARIN SODIUM 40 MILLIGRAM(S): 100 INJECTION SUBCUTANEOUS at 13:02

## 2018-06-23 RX ADMIN — SODIUM CHLORIDE 50 MILLILITER(S): 9 INJECTION, SOLUTION INTRAVENOUS at 21:29

## 2018-06-23 RX ADMIN — PANTOPRAZOLE SODIUM 40 MILLIGRAM(S): 20 TABLET, DELAYED RELEASE ORAL at 13:01

## 2018-06-23 RX ADMIN — SODIUM CHLORIDE 50 MILLILITER(S): 9 INJECTION, SOLUTION INTRAVENOUS at 14:52

## 2018-06-23 RX ADMIN — Medication 25 MICROGRAM(S): at 05:11

## 2018-06-23 RX ADMIN — Medication 325 MILLIGRAM(S): at 21:29

## 2018-06-23 RX ADMIN — Medication 325 MILLIGRAM(S): at 13:02

## 2018-06-23 RX ADMIN — SODIUM CHLORIDE 100 MILLILITER(S): 9 INJECTION INTRAMUSCULAR; INTRAVENOUS; SUBCUTANEOUS at 05:05

## 2018-06-23 RX ADMIN — SIMVASTATIN 10 MILLIGRAM(S): 20 TABLET, FILM COATED ORAL at 21:29

## 2018-06-23 RX ADMIN — Medication 325 MILLIGRAM(S): at 05:11

## 2018-06-23 NOTE — PROGRESS NOTE ADULT - SUBJECTIVE AND OBJECTIVE BOX
Patient is a 73y old  Male who presents with a chief complaint of     HPI:  Patient is a 73 yom with pmh  of htn, hld presents from home with difficulty swallowing - hx of SCCA of esophagus. Tolerating clear liquid diet. NO regurgitation. Planning for EGD with stent on monday.     REVIEW OF SYSTEMS:  Constitutional: No fever, weight loss or fatigue  ENMT:  No difficulty hearing, tinnitus, vertigo; No sinus or throat pain  Respiratory: No cough, wheezing, chills or hemoptysis  Cardiovascular: No chest pain, palpitations, dizziness or leg swelling  Gastrointestinal: No abdominal or epigastric pain. No nausea, vomiting or hematemesis; No diarrhea or constipation. No melena or hematochezia.+ dysphagia to solids  Skin: No itching, burning, rashes or lesions   Musculoskeletal: No joint pain or swelling; No muscle, back or extremity pain    PAST MEDICAL & SURGICAL HISTORY:  High cholesterol  Hypothyroid  HTN (hypertension)  Head and neck cancer: 2007  History of head and neck cancer      FAMILY HISTORY:  No pertinent family history in first degree relatives      SOCIAL HISTORY:  Smoking Status: [ ] Current, [ ] Former, [ ] Never  Pack Years:  [  ] EtOH  [  ] IVDA    MEDICATIONS:  MEDICATIONS  (STANDING):  amLODIPine   Tablet 5 milliGRAM(s) Oral daily  enoxaparin Injectable 40 milliGRAM(s) SubCutaneous daily  ferrous    sulfate 325 milliGRAM(s) Oral three times a day  levothyroxine 25 MICROGram(s) Oral daily  pantoprazole  Injectable 40 milliGRAM(s) IV Push daily  simvastatin 10 milliGRAM(s) Oral at bedtime  sodium chloride 0.9%. 1000 milliLiter(s) (100 mL/Hr) IV Continuous <Continuous>    MEDICATIONS  (PRN):      Allergies    No Known Allergies    Intolerances        Vital Signs Last 24 Hrs  T(C): 36.9 (23 Jun 2018 07:15), Max: 37.3 (22 Jun 2018 15:30)  T(F): 98.5 (23 Jun 2018 07:15), Max: 99.1 (22 Jun 2018 15:30)  HR: 57 (23 Jun 2018 07:15) (54 - 60)  BP: 129/59 (23 Jun 2018 07:15) (129/59 - 133/67)  BP(mean): --  RR: 18 (23 Jun 2018 07:15) (18 - 18)  SpO2: 100% (23 Jun 2018 07:15) (96% - 100%)    06-22 @ 07:01  -  06-23 @ 07:00  --------------------------------------------------------  IN: 1200 mL / OUT: 0 mL / NET: 1200 mL          PHYSICAL EXAM:    General: Well developed; well nourished; in no acute distress  HEENT: MMM, conjunctiva and sclera clear Has surgical scar on left side of chin  Gastrointestinal: Soft, non-tender non-distended; Normal bowel sounds; No rebound or guarding  Extremities: Normal range of motion, No clubbing, cyanosis or edema  Neurological: Alert and oriented x3  Skin: Warm and dry. No obvious rash      LABS:                        9.6    9.0   )-----------( 308      ( 23 Jun 2018 06:02 )             30.0     23 Jun 2018 06:00    142    |  105    |  4.0    ----------------------------<  94     3.8     |  28.0   |  0.74     Ca    8.4        23 Jun 2018 06:00                RADIOLOGY & ADDITIONAL STUDIES:     < from: Xray Esophagram (06.21.18 @ 13:16) >  FINDINGS: The patient was given oral contrast (barium) by mouth.   The swallowing reflex is normal. Fluoroscopic and film examination shows   mid esophageal carcinoma with a shelflike partial obstruction of the   proximal esophagus which is distended. The carcinoma extends from mid   esophagus distally 8-cm. The distal esophagus GE junction.Is severe   narrowing of the lumen of the esophagus maximal diameter measuring 7 mm   and tightest stricture measuring 3.2 mm.   Impression:   8 cm on distal esophageal carcinoma as described.    < end of copied text >

## 2018-06-23 NOTE — PROGRESS NOTE ADULT - SUBJECTIVE AND OBJECTIVE BOX
CC: poor po intake  HPI: 73 y.o. male with PMHx of HTN, HLD, recent diagnosis of esophageal CA p/w difficulty swallowing associated with tolerance of liquids only, weight loss, lethargy and chest discomfort.      INTERVAL HPI/OVERNIGHT EVENTS: no complaints, awaiting EGD with possible dilatation and stent placement on monday  Other ROS reviewed and neg     Vital Signs Last 24 Hrs  T(C): 36.9 (23 Jun 2018 07:15), Max: 37.3 (22 Jun 2018 15:30)  T(F): 98.5 (23 Jun 2018 07:15), Max: 99.1 (22 Jun 2018 15:30)  HR: 57 (23 Jun 2018 07:15) (54 - 60)  BP: 129/59 (23 Jun 2018 07:15) (129/59 - 133/67)  RR: 18 (23 Jun 2018 07:15) (18 - 18)  SpO2: 100% (23 Jun 2018 07:15) (96% - 100%)                       9.6    9.0   )-----------( 308      ( 23 Jun 2018 06:02 )             30.0     23 Jun 2018 06:00    142    |  105    |  4.0    ----------------------------<  94     3.8     |  28.0   |  0.74     Ca    8.4        23 Jun 2018 06:00    PT/INR - ( 23 Jun 2018 06:01 )   PT: 14.7 sec;   INR: 1.33 ratio      MEDICATIONS  (STANDING):  amLODIPine   Tablet 5 milliGRAM(s) Oral daily  enoxaparin Injectable 40 milliGRAM(s) SubCutaneous daily  ferrous    sulfate 325 milliGRAM(s) Oral three times a day  levothyroxine 25 MICROGram(s) Oral daily  pantoprazole  Injectable 40 milliGRAM(s) IV Push daily  simvastatin 10 milliGRAM(s) Oral at bedtime  sodium chloride 0.9%. 1000 milliLiter(s) (100 mL/Hr) IV Continuous <Continuous>    RADIOLOGY & ADDITIONAL TESTS: personally visualized    PHYSICAL EXAM:    General: debilitated male in no acute distress  Eyes: PERRLA, EOMI; conjunctiva and sclera clear  Head: Normocephalic; atraumatic  ENMT: No nasal discharge; airway clear  Neck: Supple; non tender; no masses  Respiratory: No wheezes, rales or rhonchi  Cardiovascular: Regular rate and rhythm. S1 and S2   Gastrointestinal: Soft abdomen, NT, + BS  Genitourinary: No costovertebral angle tenderness  Extremities: Normal range of motion, No clubbing, cyanosis or edema  Vascular: Peripheral pulses palpable 2+ bilaterally  Neurological: Alert and oriented x4  Skin: Warm and dry.   Musculoskeletal: Normal tone, without deformities  Psychiatric: Cooperative and appropriate

## 2018-06-23 NOTE — PROGRESS NOTE ADULT - PROBLEM SELECTOR PLAN 1
esophageal CA - s/p CT, esophagram - plan for EGD on monday with attempt to dilate distal esophageal narrowing and place stent to improve po intake, otherwise will need PEG

## 2018-06-24 ENCOUNTER — TRANSCRIPTION ENCOUNTER (OUTPATIENT)
Age: 73
End: 2018-06-24

## 2018-06-24 DIAGNOSIS — R13.10 DYSPHAGIA, UNSPECIFIED: ICD-10-CM

## 2018-06-24 PROCEDURE — 99232 SBSQ HOSP IP/OBS MODERATE 35: CPT

## 2018-06-24 RX ADMIN — Medication 25 MICROGRAM(S): at 05:09

## 2018-06-24 RX ADMIN — SODIUM CHLORIDE 50 MILLILITER(S): 9 INJECTION, SOLUTION INTRAVENOUS at 21:13

## 2018-06-24 RX ADMIN — Medication 325 MILLIGRAM(S): at 05:08

## 2018-06-24 RX ADMIN — AMLODIPINE BESYLATE 5 MILLIGRAM(S): 2.5 TABLET ORAL at 05:08

## 2018-06-24 RX ADMIN — Medication 325 MILLIGRAM(S): at 21:13

## 2018-06-24 RX ADMIN — SIMVASTATIN 10 MILLIGRAM(S): 20 TABLET, FILM COATED ORAL at 21:13

## 2018-06-24 RX ADMIN — SODIUM CHLORIDE 50 MILLILITER(S): 9 INJECTION, SOLUTION INTRAVENOUS at 05:08

## 2018-06-24 RX ADMIN — Medication 325 MILLIGRAM(S): at 16:49

## 2018-06-24 RX ADMIN — PANTOPRAZOLE SODIUM 40 MILLIGRAM(S): 20 TABLET, DELAYED RELEASE ORAL at 12:56

## 2018-06-24 NOTE — DIETITIAN INITIAL EVALUATION ADULT. - NS AS NUTRI INTERV ENTERAL NUTRITION
If unable to advance po diet initiate enteral feeds of Jevity 1.5cal. Bolus feeds: 240ml 5 x daily (1775kcal and 76 grams protein daily). Continuous feeds: Inititiate 30ml/hr, increase by 10ml/hr every 8 hours to goal rate 60ml/hr x 20 hours (1200ml/daily) to provide 1800kcal and 77 grams protein. If unable to advance po diet initiate enteral feeds of Jevity 1.5cal. Bolus feeds: 240ml 5 x daily (1775kcal and 76 grams protein daily) or Continuous feeds: Inititiate 30ml/hr, increase by 10ml/hr every 8 hours to goal rate 60ml/hr x 20 hours (1200ml/daily) to provide 1800kcal and 77 grams protein daily.

## 2018-06-24 NOTE — DIETITIAN INITIAL EVALUATION ADULT. - NUTRITION INTERVENTION
Meals and Snack/Collaboration and Referral of Nutrition Care/Enteral Nutrition/Medical Food Supplements/Vitamin

## 2018-06-24 NOTE — DIETITIAN INITIAL EVALUATION ADULT. - OTHER INFO
Pt sipping clear liquids, plan for NPO after midnight for EGD with possible dialation and esophageal stent placement. Plan for possible PEG also noted. Pt sipping clear liquids, plan for NPO after midnight tonight for EGD with possible dialation and esophageal stent placement tomorrow. Plan for possible PEG placement also noted.

## 2018-06-24 NOTE — CHART NOTE - NSCHARTNOTEFT_GEN_A_CORE
Upon Nutritional Assessment by the Registered Dietitian your patient was determined to meet criteria / has evidence of the following diagnosis/diagnoses:          [ ]  Mild Protein Calorie Malnutrition        [ ]  Moderate Protein Calorie Malnutrition        [ X] Severe Protein Calorie Malnutrition (chronic)        [ ] Unspecified Protein Calorie Malnutrition        [ ] Underweight / BMI <19        [ ] Morbid Obesity / BMI > 40      Findings as based on:  •  Comprehensive nutrition assessment and consultation  •  Calorie counts (nutrient intake analysis)  •  Food acceptance and intake status from observations by staff  •  Follow up  •  Patient education  •  Intervention secondary to interdisciplinary rounds  •   concerns      Treatment:    The following diet has been recommended:  1. As medically feasible advance po diet per SLP recommendations for consistency with 8oz Ensure Enlive TID po.  2. If unable to advance po diet initiate enteral feeds of Jevity 1.5cal.  Bolus feeds: 240ml 5 x daily  to provide 1775kcal and 76 grams protein daily or Continuous feeds: Start at 30ml/hr and increase by 10ml/hr every 8 hours to goal rate 60ml/hr x 20 hours (1200ml/daily) to provide 1800kcal and 77 grams protein daily.  3. MVI daily   4. SLP consult for swallow evaluation.      PROVIDER Section:     By signing this assessment you are acknowledging and agree with the diagnosis/diagnoses assigned by the Registered Dietitian    Comments: Upon Nutritional Assessment by the Registered Dietitian your patient was determined to meet criteria / has evidence of the following diagnosis/diagnoses:          [ ]  Mild Protein Calorie Malnutrition        [ ]  Moderate Protein Calorie Malnutrition        [ X] Severe Protein Calorie Malnutrition (chronic)        [ ] Unspecified Protein Calorie Malnutrition        [ ] Underweight / BMI <19        [ ] Morbid Obesity / BMI > 40      Findings as based on:  •  Comprehensive nutrition assessment and consultation  •  Calorie counts (nutrient intake analysis)  •  Food acceptance and intake status from observations by staff  •  Follow up  •  Patient education  •  Intervention secondary to interdisciplinary rounds  •   concerns      Treatment:    The following diet has been recommended:  1. As medically feasible advance po diet per SLP recommendations for consistency with 8oz Ensure Enlive TID po.  2. If unable to advance po diet initiate enteral feeds of Jevity 1.5cal.  Bolus feeds: 240ml 5 x daily  to provide 1775kcal and 76 grams protein daily or Continuous feeds: Start at 30ml/hr and increase by 10ml/hr every 8 hours to goal rate 60ml/hr x 20 hours (1200ml/daily) to provide 1800kcal and 77 grams protein daily.  3. MVI daily   4. SLP consult for swallow evaluation.      PROVIDER Section:  Agreed and acknowledged    By signing this assessment you are acknowledging and agree with the diagnosis/diagnoses assigned by the Registered Dietitian    Comments:

## 2018-06-24 NOTE — PROGRESS NOTE ADULT - SUBJECTIVE AND OBJECTIVE BOX
Patient is a 73y old  Male who presents with a chief complaint of     HPI:  Patient is a 73 yom with pmh  of htn, hld.  Has newly diagnosed esophageal CA. Tolerates clear liquids. has no abdominal pain        REVIEW OF SYSTEMS:  Constitutional: No fever, weight loss or fatigue  ENMT:  No difficulty hearing, tinnitus, vertigo; No sinus or throat pain  Respiratory: No cough, wheezing, chills or hemoptysis  Cardiovascular: No chest pain, palpitations, dizziness or leg swelling  Gastrointestinal: No abdominal or epigastric pain. No nausea, vomiting or hematemesis; No diarrhea or constipation. No melena or hematochezia.  Skin: No itching, burning, rashes or lesions   Musculoskeletal: No joint pain or swelling; No muscle, back or extremity pain    PAST MEDICAL & SURGICAL HISTORY:  High cholesterol  Hypothyroid  HTN (hypertension)  Head and neck cancer: 2007  History of head and neck cancer      FAMILY HISTORY:  No pertinent family history in first degree relatives      SOCIAL HISTORY:  Smoking Status: [ ] Current, [ ] Former, [ ] Never  Pack Years:  [  ] EtOH  [  ] IVDA    MEDICATIONS:  MEDICATIONS  (STANDING):  amLODIPine   Tablet 5 milliGRAM(s) Oral daily  dextrose 5% + lactated ringers. 1000 milliLiter(s) (50 mL/Hr) IV Continuous <Continuous>  enoxaparin Injectable 40 milliGRAM(s) SubCutaneous daily  ferrous    sulfate 325 milliGRAM(s) Oral three times a day  levothyroxine 25 MICROGram(s) Oral daily  pantoprazole  Injectable 40 milliGRAM(s) IV Push daily  simvastatin 10 milliGRAM(s) Oral at bedtime    MEDICATIONS  (PRN):      Allergies    No Known Allergies    Intolerances        Vital Signs Last 24 Hrs  T(C): 36.7 (24 Jun 2018 07:30), Max: 37.2 (23 Jun 2018 15:10)  T(F): 98 (24 Jun 2018 07:30), Max: 99 (24 Jun 2018 04:40)  HR: 55 (24 Jun 2018 07:30) (51 - 55)  BP: 143/62 (24 Jun 2018 07:30) (121/56 - 143/62)  BP(mean): --  RR: 18 (24 Jun 2018 07:30) (18 - 18)  SpO2: 97% (24 Jun 2018 07:30) (97% - 99%)    06-23 @ 07:01  -  06-24 @ 07:00  --------------------------------------------------------  IN: 650 mL / OUT: 100 mL / NET: 550 mL          PHYSICAL EXAM:    General: Well developed; well nourished; in no acute distress  HEENT: MMM, conjunctiva and sclera clear  H- RRR  l -CTA  Gastrointestinal: Soft, non-tender non-distended; Normal bowel sounds; No rebound or guarding  Extremities: Normal range of motion, No clubbing, cyanosis or edema  Neurological: Alert and oriented x3  Skin: Warm and dry. No obvious rash      LABS:                        9.6    9.0   )-----------( 308      ( 23 Jun 2018 06:02 )             30.0     23 Jun 2018 06:00    142    |  105    |  4.0    ----------------------------<  94     3.8     |  28.0   |  0.74     Ca    8.4        23 Jun 2018 06:00                RADIOLOGY & ADDITIONAL STUDIES:

## 2018-06-24 NOTE — DIETITIAN INITIAL EVALUATION ADULT. - PHYSICAL APPEARANCE
physical signs of malnutrition [ ]absent [X ]present. [ ]Mild [ ]Moderate [X ]Severe Muscle wasting present at [X] temporal [ X]clavicle [ ]interosseos [ ]calf. [ ]Mild [ ]Moderate [X ]Severe subcutaneous fat loss presents at [ ]ribs []orbital region [ ]triceps [X ]buccal area.

## 2018-06-24 NOTE — PROGRESS NOTE ADULT - PROBLEM SELECTOR PLAN 1
Patient esophagal mass  - EGD with possible dilation and esophageal stent placement  - npo after midnight, hold lovenox

## 2018-06-25 PROCEDURE — 99232 SBSQ HOSP IP/OBS MODERATE 35: CPT

## 2018-06-25 PROCEDURE — 43266 EGD ENDOSCOPIC STENT PLACE: CPT

## 2018-06-25 RX ORDER — PANTOPRAZOLE SODIUM 20 MG/1
40 TABLET, DELAYED RELEASE ORAL
Qty: 0 | Refills: 0 | Status: DISCONTINUED | OUTPATIENT
Start: 2018-06-25 | End: 2018-06-28

## 2018-06-25 RX ORDER — SODIUM CHLORIDE 9 MG/ML
1000 INJECTION, SOLUTION INTRAVENOUS
Qty: 0 | Refills: 0 | Status: DISCONTINUED | OUTPATIENT
Start: 2018-06-25 | End: 2018-06-25

## 2018-06-25 RX ORDER — ONDANSETRON 8 MG/1
4 TABLET, FILM COATED ORAL ONCE
Qty: 0 | Refills: 0 | Status: DISCONTINUED | OUTPATIENT
Start: 2018-06-25 | End: 2018-06-25

## 2018-06-25 RX ORDER — FENTANYL CITRATE 50 UG/ML
25 INJECTION INTRAVENOUS
Qty: 0 | Refills: 0 | Status: DISCONTINUED | OUTPATIENT
Start: 2018-06-25 | End: 2018-06-25

## 2018-06-25 RX ADMIN — SODIUM CHLORIDE 50 MILLILITER(S): 9 INJECTION, SOLUTION INTRAVENOUS at 18:33

## 2018-06-25 RX ADMIN — AMLODIPINE BESYLATE 5 MILLIGRAM(S): 2.5 TABLET ORAL at 05:19

## 2018-06-25 RX ADMIN — PANTOPRAZOLE SODIUM 40 MILLIGRAM(S): 20 TABLET, DELAYED RELEASE ORAL at 21:12

## 2018-06-25 RX ADMIN — SODIUM CHLORIDE 50 MILLILITER(S): 9 INJECTION, SOLUTION INTRAVENOUS at 05:19

## 2018-06-25 RX ADMIN — PANTOPRAZOLE SODIUM 40 MILLIGRAM(S): 20 TABLET, DELAYED RELEASE ORAL at 12:24

## 2018-06-25 NOTE — BRIEF OPERATIVE NOTE - PROCEDURE
<<-----Click on this checkbox to enter Procedure EGD with insertion of stent, with esophageal dilation over guide wire passage if indicated  06/25/2018    Active  [id number]

## 2018-06-25 NOTE — PROGRESS NOTE ADULT - SUBJECTIVE AND OBJECTIVE BOX
CC: Poor PO intake    HPI:  73 y.o. male with PMHx of HTN, HLD, recent diagnosis of esophageal CA p/w difficulty swallowing associated with tolerance of liquids only, weight loss, lethargy and chest discomfort.      INTERVAL HPI/OVERNIGHT EVENTS: Patient seen and examined lying in bed.  Patient denies any headache, dizziness, SOB, CP, abdominal pain, nausea, vomiting, dysuria.  Other ROS reviewed and are negative.    Vital Signs Last 24 Hrs  T(C): 36.8 (25 Jun 2018 07:30), Max: 37.3 (25 Jun 2018 04:56)  T(F): 98.2 (25 Jun 2018 07:30), Max: 99.2 (25 Jun 2018 04:56)  HR: 58 (25 Jun 2018 07:30) (53 - 58)  BP: 130/60 (25 Jun 2018 07:30) (123/62 - 138/61)  BP(mean): --  RR: 18 (25 Jun 2018 07:30) (18 - 18)  SpO2: 98% (25 Jun 2018 07:30) (97% - 99%)  I&O's Detail    PHYSICAL EXAM:  GENERAL: NAD, cachetic  HEAD:  Atraumatic, Normocephalic  NECK: Supple, No JVD, Normal thyroid  NERVOUS SYSTEM:  Alert & Oriented X3, Good concentration; Motor Strength 5/5 B/L upper and lower extremities  CHEST/LUNG: Clear to auscultation bilaterally; No rales, rhonchi, wheezing, or rubs  HEART: Regular rate and rhythm; No murmurs, rubs, or gallops  ABDOMEN: Soft, Nontender, Nondistended; Bowel sounds present  EXTREMITIES:  2+ Peripheral Pulses, No clubbing, cyanosis, or edema        MEDICATIONS  (STANDING):  amLODIPine   Tablet 5 milliGRAM(s) Oral daily  dextrose 5% + lactated ringers. 1000 milliLiter(s) (50 mL/Hr) IV Continuous <Continuous>  ferrous    sulfate 325 milliGRAM(s) Oral three times a day  levothyroxine 25 MICROGram(s) Oral daily  pantoprazole  Injectable 40 milliGRAM(s) IV Push daily  simvastatin 10 milliGRAM(s) Oral at bedtime    MEDICATIONS  (PRN): CC: Poor PO intake    HPI:  73 y.o. male with PMHx of HTN, HLD, recent diagnosis of esophageal CA p/w difficulty swallowing associated with tolerance of liquids only, weight loss, lethargy and chest discomfort.      INTERVAL HPI/OVERNIGHT EVENTS: Patient seen and examined lying in bed.  Patient denies any headache, dizziness, SOB, CP, abdominal pain, nausea, vomiting, dysuria.  Other ROS reviewed and are negative.    Vital Signs Last 24 Hrs  T(C): 36.8 (25 Jun 2018 07:30), Max: 37.3 (25 Jun 2018 04:56)  T(F): 98.2 (25 Jun 2018 07:30), Max: 99.2 (25 Jun 2018 04:56)  HR: 58 (25 Jun 2018 07:30) (53 - 58)  BP: 130/60 (25 Jun 2018 07:30) (123/62 - 138/61)  RR: 18 (25 Jun 2018 07:30) (18 - 18)  SpO2: 98% (25 Jun 2018 07:30) (97% - 99%)    PHYSICAL EXAM:  GENERAL: NAD, cachetic  HEAD:  Atraumatic, Normocephalic  NECK: Supple, No JVD, Normal thyroid  NERVOUS SYSTEM:  Alert & Oriented X3, Good concentration; Motor Strength 5/5 B/L upper and lower extremities  CHEST/LUNG: Clear to auscultation bilaterally; No rales, rhonchi, wheezing, or rubs  HEART: Regular rate and rhythm; No murmurs, rubs, or gallops  ABDOMEN: Soft, Nontender, Nondistended; Bowel sounds present  EXTREMITIES:  2+ Peripheral Pulses, No clubbing, cyanosis, or edema        MEDICATIONS  (STANDING):  amLODIPine   Tablet 5 milliGRAM(s) Oral daily  dextrose 5% + lactated ringers. 1000 milliLiter(s) (50 mL/Hr) IV Continuous <Continuous>  ferrous    sulfate 325 milliGRAM(s) Oral three times a day  levothyroxine 25 MICROGram(s) Oral daily  pantoprazole  Injectable 40 milliGRAM(s) IV Push daily  simvastatin 10 milliGRAM(s) Oral at bedtime    MEDICATIONS  (PRN):

## 2018-06-25 NOTE — BRIEF OPERATIVE NOTE - POST-OP DX
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

## 2018-06-25 NOTE — BRIEF OPERATIVE NOTE - OPERATION/FINDINGS
Esophageal mass-25 cm-34 cm. Partial lumen obstructing  GE junction at 40 cm  Normal stomach  Normal duodenum    18 mm x 103 mm fully covered esophageal stent placement over guidewire

## 2018-06-25 NOTE — PROGRESS NOTE ADULT - ASSESSMENT
73 y.o. male with PMHx of HTN, HLD, recent diagnosis of esophageal CA p/w difficulty swallowing associated with tolerance of liquids only, weight loss, lethargy and chest discomfort. CT chest/abd/pelvis with thickening of mid to lower esophagus over 7.9cm; and subcarinal and gastrohepatic metastatic lymphadenopathy.  Scheduled for dilatation and stent today; if unsuccessful will need peg.

## 2018-06-25 NOTE — PROGRESS NOTE ADULT - PROBLEM SELECTOR PLAN 1
esophageal CA - s/p CT, esophagram - EGD today with attempt to dilate distal esophageal narrowing and place stent to improve po intake, otherwise will need PEG

## 2018-06-26 DIAGNOSIS — C15.4 MALIGNANT NEOPLASM OF MIDDLE THIRD OF ESOPHAGUS: ICD-10-CM

## 2018-06-26 PROCEDURE — 99232 SBSQ HOSP IP/OBS MODERATE 35: CPT

## 2018-06-26 PROCEDURE — 99232 SBSQ HOSP IP/OBS MODERATE 35: CPT | Mod: GC

## 2018-06-26 PROCEDURE — 74019 RADEX ABDOMEN 2 VIEWS: CPT | Mod: 26

## 2018-06-26 PROCEDURE — 71046 X-RAY EXAM CHEST 2 VIEWS: CPT | Mod: 26

## 2018-06-26 RX ORDER — ACETAMINOPHEN 500 MG
325 TABLET ORAL EVERY 4 HOURS
Qty: 0 | Refills: 0 | Status: DISCONTINUED | OUTPATIENT
Start: 2018-06-26 | End: 2018-06-28

## 2018-06-26 RX ORDER — ENOXAPARIN SODIUM 100 MG/ML
40 INJECTION SUBCUTANEOUS DAILY
Qty: 0 | Refills: 0 | Status: DISCONTINUED | OUTPATIENT
Start: 2018-06-26 | End: 2018-06-28

## 2018-06-26 RX ADMIN — Medication 325 MILLIGRAM(S): at 22:08

## 2018-06-26 RX ADMIN — Medication 325 MILLIGRAM(S): at 23:05

## 2018-06-26 RX ADMIN — SIMVASTATIN 10 MILLIGRAM(S): 20 TABLET, FILM COATED ORAL at 21:27

## 2018-06-26 RX ADMIN — PANTOPRAZOLE SODIUM 40 MILLIGRAM(S): 20 TABLET, DELAYED RELEASE ORAL at 18:07

## 2018-06-26 RX ADMIN — SODIUM CHLORIDE 50 MILLILITER(S): 9 INJECTION, SOLUTION INTRAVENOUS at 13:53

## 2018-06-26 RX ADMIN — ENOXAPARIN SODIUM 40 MILLIGRAM(S): 100 INJECTION SUBCUTANEOUS at 18:33

## 2018-06-26 RX ADMIN — PANTOPRAZOLE SODIUM 40 MILLIGRAM(S): 20 TABLET, DELAYED RELEASE ORAL at 05:41

## 2018-06-26 RX ADMIN — Medication 325 MILLIGRAM(S): at 21:27

## 2018-06-26 RX ADMIN — Medication 325 MILLIGRAM(S): at 13:53

## 2018-06-26 NOTE — PROGRESS NOTE ADULT - SUBJECTIVE AND OBJECTIVE BOX
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    REVIEW OF SYSTEMS:    CONSTITUTIONAL: No fever, weight loss, or fatigue  EYES: No eye pain, visual disturbances, or discharge  ENMT:  No difficulty hearing, tinnitus, vertigo; No sinus or throat pain  RESPIRATORY: No cough, wheezing, chills or hemoptysis; No shortness of breath  CARDIOVASCULAR: No chest pain, palpitations, dizziness, or leg swelling  GASTROINTESTINAL: No abdominal or epigastric pain. No nausea, vomiting, or hematemesis; No diarrhea or constipation. No melena or hematochezia.    MEDICATIONS:  MEDICATIONS  (STANDING):  amLODIPine   Tablet 5 milliGRAM(s) Oral daily  dextrose 5% + lactated ringers. 1000 milliLiter(s) (50 mL/Hr) IV Continuous <Continuous>  ferrous    sulfate 325 milliGRAM(s) Oral three times a day  levothyroxine 25 MICROGram(s) Oral daily  pantoprazole  Injectable 40 milliGRAM(s) IV Push two times a day  simvastatin 10 milliGRAM(s) Oral at bedtime    MEDICATIONS  (PRN):      Allergies    No Known Allergies    Intolerances        Vital Signs Last 24 Hrs  T(C): 37.4 (26 Jun 2018 05:04), Max: 37.4 (26 Jun 2018 05:04)  T(F): 99.3 (26 Jun 2018 05:04), Max: 99.3 (26 Jun 2018 05:04)  HR: 57 (26 Jun 2018 05:04) (56 - 63)  BP: 133/68 (26 Jun 2018 05:04) (130/60 - 167/60)  BP(mean): --  RR: 18 (26 Jun 2018 05:04) (12 - 21)  SpO2: 98% (26 Jun 2018 05:04) (98% - 100%)    06-25 @ 07:01  -  06-26 @ 07:00  --------------------------------------------------------  IN: 640 mL / OUT: 0 mL / NET: 640 mL        PHYSICAL EXAM:    General: Well developed; well nourished; in no acute distress  HEENT: MMM, conjunctiva and sclera clear, post op changes to lower jaw  Gastrointestinal:Abdomen: Soft non-tender non-distended; Normal bowel sounds; No hepatosplenomegaly  Extremities: no cyanosis, clubbing or edema.  Skin: Warm and dry. No obvious rash    LABS:                                RADIOLOGY & ADDITIONAL STUDIES (The following images were personally reviewed):

## 2018-06-26 NOTE — PROGRESS NOTE ADULT - ASSESSMENT
73 y.o. male with PMHx of HTN, HLD, recent diagnosis of esophageal CA p/w difficulty swallowing associated with tolerance of liquids only, weight loss, lethargy and chest discomfort. CT chest/abd/pelvis with thickening of mid to lower esophagus over 7.9cm; and subcarinal and gastrohepatic metastatic lymphadenopathy.  S/p dilatation and stent 6/25/18.

## 2018-06-26 NOTE — PROGRESS NOTE ADULT - SUBJECTIVE AND OBJECTIVE BOX
CC: Decreased Po intake    HPI:  73 y.o. male with PMHx of HTN, HLD, recent diagnosis of esophageal CA p/w difficulty swallowing associated with tolerance of liquids only, weight loss, lethargy and chest discomfort.      INTERVAL HPI/OVERNIGHT EVENTS: Patient seen and examined lying in bed.  Patient tolerating clear liquids.  Patient denies any headache, dizziness, SOB, CP, abdominal pain, nausea, vomiting, dysuria.  Other ROS reviewed and are negative.    Vital Signs Last 24 Hrs  T(C): 37 (26 Jun 2018 07:59), Max: 37.4 (26 Jun 2018 05:04)  T(F): 98.6 (26 Jun 2018 07:59), Max: 99.3 (26 Jun 2018 05:04)  HR: 57 (26 Jun 2018 07:59) (56 - 63)  BP: 139/64 (26 Jun 2018 07:59) (133/68 - 167/60)  BP(mean): --  RR: 18 (26 Jun 2018 07:59) (12 - 21)  SpO2: 98% (26 Jun 2018 07:59) (98% - 100%)  I&O's Detail    25 Jun 2018 07:01  -  26 Jun 2018 07:00  --------------------------------------------------------  IN:    dextrose 5% + lactated ringers.: 640 mL  Total IN: 640 mL    OUT:  Total OUT: 0 mL    Total NET: 640 mL      PHYSICAL EXAM:  GENERAL: NAD, cachetic  HEAD:  Atraumatic, Normocephalic  NECK: Supple, No JVD, Normal thyroid  NERVOUS SYSTEM:  Alert & Oriented X3, Good concentration; Motor Strength 5/5 B/L upper and lower extremities  CHEST/LUNG: Clear to auscultation bilaterally; No rales, rhonchi, wheezing, or rubs  HEART: Regular rate and rhythm; No murmurs, rubs, or gallops  ABDOMEN: Soft, Nontender, Nondistended; Bowel sounds present  EXTREMITIES:  2+ Peripheral Pulses, No clubbing, cyanosis, or edema          MEDICATIONS  (STANDING):  amLODIPine   Tablet 5 milliGRAM(s) Oral daily  dextrose 5% + lactated ringers. 1000 milliLiter(s) (50 mL/Hr) IV Continuous <Continuous>  ferrous    sulfate 325 milliGRAM(s) Oral three times a day  levothyroxine 25 MICROGram(s) Oral daily  pantoprazole  Injectable 40 milliGRAM(s) IV Push two times a day  simvastatin 10 milliGRAM(s) Oral at bedtime    MEDICATIONS  (PRN):

## 2018-06-26 NOTE — PROGRESS NOTE ADULT - PROBLEM SELECTOR PLAN 1
esophageal CA - s/p CT, esophagram, s/p dilatation with stent placement 6/25/18  Started on clears today.  Possible advance to stent diet as per GI 6/27/18

## 2018-06-27 ENCOUNTER — TRANSCRIPTION ENCOUNTER (OUTPATIENT)
Age: 73
End: 2018-06-27

## 2018-06-27 LAB
ANION GAP SERPL CALC-SCNC: 10 MMOL/L — SIGNIFICANT CHANGE UP (ref 5–17)
BUN SERPL-MCNC: 6 MG/DL — LOW (ref 8–20)
CALCIUM SERPL-MCNC: 8.7 MG/DL — SIGNIFICANT CHANGE UP (ref 8.6–10.2)
CHLORIDE SERPL-SCNC: 103 MMOL/L — SIGNIFICANT CHANGE UP (ref 98–107)
CO2 SERPL-SCNC: 30 MMOL/L — HIGH (ref 22–29)
CREAT SERPL-MCNC: 0.76 MG/DL — SIGNIFICANT CHANGE UP (ref 0.5–1.3)
GLUCOSE SERPL-MCNC: 91 MG/DL — SIGNIFICANT CHANGE UP (ref 70–115)
HCT VFR BLD CALC: 31.1 % — LOW (ref 42–52)
HGB BLD-MCNC: 9.9 G/DL — LOW (ref 14–18)
MCHC RBC-ENTMCNC: 26.9 PG — LOW (ref 27–31)
MCHC RBC-ENTMCNC: 31.8 G/DL — LOW (ref 32–36)
MCV RBC AUTO: 84.5 FL — SIGNIFICANT CHANGE UP (ref 80–94)
PLATELET # BLD AUTO: 271 K/UL — SIGNIFICANT CHANGE UP (ref 150–400)
POTASSIUM SERPL-MCNC: 3.9 MMOL/L — SIGNIFICANT CHANGE UP (ref 3.5–5.3)
POTASSIUM SERPL-SCNC: 3.9 MMOL/L — SIGNIFICANT CHANGE UP (ref 3.5–5.3)
RBC # BLD: 3.68 M/UL — LOW (ref 4.6–6.2)
RBC # FLD: 13.7 % — SIGNIFICANT CHANGE UP (ref 11–15.6)
SODIUM SERPL-SCNC: 143 MMOL/L — SIGNIFICANT CHANGE UP (ref 135–145)
WBC # BLD: 9.4 K/UL — SIGNIFICANT CHANGE UP (ref 4.8–10.8)
WBC # FLD AUTO: 9.4 K/UL — SIGNIFICANT CHANGE UP (ref 4.8–10.8)

## 2018-06-27 PROCEDURE — 99232 SBSQ HOSP IP/OBS MODERATE 35: CPT

## 2018-06-27 PROCEDURE — 76937 US GUIDE VASCULAR ACCESS: CPT | Mod: 26

## 2018-06-27 PROCEDURE — 36000 PLACE NEEDLE IN VEIN: CPT

## 2018-06-27 RX ORDER — PANTOPRAZOLE SODIUM 20 MG/1
1 TABLET, DELAYED RELEASE ORAL
Qty: 60 | Refills: 0 | OUTPATIENT
Start: 2018-06-27 | End: 2018-07-26

## 2018-06-27 RX ADMIN — AMLODIPINE BESYLATE 5 MILLIGRAM(S): 2.5 TABLET ORAL at 06:11

## 2018-06-27 RX ADMIN — Medication 25 MICROGRAM(S): at 06:11

## 2018-06-27 RX ADMIN — SODIUM CHLORIDE 50 MILLILITER(S): 9 INJECTION, SOLUTION INTRAVENOUS at 21:03

## 2018-06-27 RX ADMIN — SIMVASTATIN 10 MILLIGRAM(S): 20 TABLET, FILM COATED ORAL at 21:03

## 2018-06-27 RX ADMIN — Medication 325 MILLIGRAM(S): at 21:03

## 2018-06-27 RX ADMIN — Medication 325 MILLIGRAM(S): at 14:00

## 2018-06-27 RX ADMIN — PANTOPRAZOLE SODIUM 40 MILLIGRAM(S): 20 TABLET, DELAYED RELEASE ORAL at 10:25

## 2018-06-27 RX ADMIN — Medication 325 MILLIGRAM(S): at 18:40

## 2018-06-27 RX ADMIN — Medication 325 MILLIGRAM(S): at 06:11

## 2018-06-27 RX ADMIN — ENOXAPARIN SODIUM 40 MILLIGRAM(S): 100 INJECTION SUBCUTANEOUS at 12:02

## 2018-06-27 RX ADMIN — PANTOPRAZOLE SODIUM 40 MILLIGRAM(S): 20 TABLET, DELAYED RELEASE ORAL at 18:41

## 2018-06-27 NOTE — DISCHARGE NOTE ADULT - MEDICATION SUMMARY - MEDICATIONS TO TAKE
I will START or STAY ON the medications listed below when I get home from the hospital:    Zocor 10 mg oral tablet  -- 1 tab(s) by mouth once a day (at bedtime)  -- Indication: For High cholesterol    Norvasc 5 mg oral tablet  -- 1 tab(s) by mouth once a day  -- Indication: For Essential hypertension    ferrous sulfate 325 mg (65 mg elemental iron) oral tablet  -- 1 tab(s) by mouth 3 times a day  -- Indication: For Supplement    pantoprazole 40 mg oral delayed release tablet  -- 1 tab(s) by mouth 2 times a day   -- Indication: For GERD    levothyroxine 25 mcg (0.025 mg) oral tablet  -- 1 tab(s) by mouth once a day  -- Indication: For Hypothyroid I will START or STAY ON the medications listed below when I get home from the hospital:    Zocor 10 mg oral tablet  -- 1 tab(s) by mouth once a day (at bedtime)  -- Indication: For High cholesterol    Norvasc 5 mg oral tablet  -- 1 tab(s) by mouth once a day  -- Indication: For Essential hypertension    ferrous sulfate 325 mg (65 mg elemental iron) oral tablet  -- 1 tab(s) by mouth 2 times a day  -- Indication: For Dysphagia, unspecified type    pantoprazole 40 mg oral delayed release tablet  -- 1 tab(s) by mouth 2 times a day   -- Indication: For Prophylaxis     pantoprazole 40 mg oral delayed release tablet  -- 1 tab(s) by mouth 2 times a day   -- Indication: For GERD    levothyroxine 25 mcg (0.025 mg) oral tablet  -- 1 tab(s) by mouth once a day  -- Indication: For Hypothyroid

## 2018-06-27 NOTE — PROGRESS NOTE ADULT - ATTENDING COMMENTS
pt seen and imaging reviewed.  SCC at [facility], now dysphagia.  Needs ta including staging CT and PET.  Will re-eval as outpt.  ?feeding jej if obstructed
Pt seen and examined with NP. A&P reviewed.
Pt seen and examined with NP. A&P reviewed.   Esophagram to determine if stent possible, otherwise PEG

## 2018-06-27 NOTE — PROGRESS NOTE ADULT - SUBJECTIVE AND OBJECTIVE BOX
Pt seen and examined f/u s/p esophageal stent placement  This morning he feels good and is tolerating clear liquids without difficulty. X rays yesterday reveal good [placement of stent and good distention of stent.    REVIEW OF SYSTEMS:    CONSTITUTIONAL: No fever, weight loss, or fatigue  EYES: No eye pain, visual disturbances, or discharge  ENMT:  No difficulty hearing, tinnitus, vertigo; No sinus or throat pain  RESPIRATORY: No cough, wheezing, chills or hemoptysis; No shortness of breath  CARDIOVASCULAR: No chest pain, palpitations, dizziness, or leg swelling  GASTROINTESTINAL: No abdominal or epigastric pain. No nausea, vomiting, or hematemesis; No diarrhea or constipation. No melena or hematochezia.    MEDICATIONS:  MEDICATIONS  (STANDING):  amLODIPine   Tablet 5 milliGRAM(s) Oral daily  dextrose 5% + lactated ringers. 1000 milliLiter(s) (50 mL/Hr) IV Continuous <Continuous>  enoxaparin Injectable 40 milliGRAM(s) SubCutaneous daily  ferrous    sulfate 325 milliGRAM(s) Oral three times a day  levothyroxine 25 MICROGram(s) Oral daily  pantoprazole  Injectable 40 milliGRAM(s) IV Push two times a day  simvastatin 10 milliGRAM(s) Oral at bedtime    MEDICATIONS  (PRN):  acetaminophen   Tablet. 325 milliGRAM(s) Oral every 4 hours PRN Moderate Pain (4 - 6)      Allergies    No Known Allergies    Intolerances        Vital Signs Last 24 Hrs  T(C): 36.8 (27 Jun 2018 07:30), Max: 37.6 (26 Jun 2018 15:42)  T(F): 98.2 (27 Jun 2018 07:30), Max: 99.7 (26 Jun 2018 20:51)  HR: 50 (27 Jun 2018 07:30) (50 - 62)  BP: 124/63 (27 Jun 2018 07:30) (124/63 - 136/58)  BP(mean): --  RR: 18 (27 Jun 2018 07:30) (18 - 18)  SpO2: 98% (27 Jun 2018 07:30) (94% - 100%)    06-26 @ 07:01  -  06-27 @ 07:00  --------------------------------------------------------  IN: 500 mL / OUT: 0 mL / NET: 500 mL        PHYSICAL EXAM:    General: Well developed; well nourished; in no acute distress  HEENT: MMM, conjunctiva and sclera clear. Post op changes present on mandible  Gastrointestinal:Abdomen: Soft non-tender non-distended; Normal bowel sounds; No hepatosplenomegaly  Extremities: no cyanosis, clubbing or edema.  Skin: Warm and dry. No obvious rash    LABS:      CBC Full  -  ( 27 Jun 2018 06:09 )  WBC Count : 9.4 K/uL  Hemoglobin : 9.9 g/dL  Hematocrit : 31.1 %  Platelet Count - Automated : 271 K/uL  Mean Cell Volume : 84.5 fl  Mean Cell Hemoglobin : 26.9 pg  Mean Cell Hemoglobin Concentration : 31.8 g/dL  Auto Neutrophil # : x  Auto Lymphocyte # : x  Auto Monocyte # : x  Auto Eosinophil # : x  Auto Basophil # : x  Auto Neutrophil % : x  Auto Lymphocyte % : x  Auto Monocyte % : x  Auto Eosinophil % : x  Auto Basophil % : x    06-27    143  |  103  |  6.0<L>  ----------------------------<  91  3.9   |  30.0<H>  |  0.76    Ca    8.7      27 Jun 2018 06:09                        RADIOLOGY & ADDITIONAL STUDIES (The following images were personally reviewed):  < from: Xray Chest 2 Views PA/Lat (06.26.18 @ 09:10) >  INTERPRETATION:  PA and lateral chest radiographs     COMPARISON:  NONE.    CLINICAL INFORMATION: Chest Pain.    FINDINGS:  A distal esophageal stent in place in patient with known history of   squamous cell carcinoma of the esophagus. The small bilateral pleural   effusions on lateral view. No airspace consolidation seen. No gross   evidence of widened mediastinum identified.  The airway is midline.  Thereare no airspace consolidations.  .   The heart size is within the limits of normal.   The visualized osseus structures are intact.     IMPRESSION:  Esophageal stent in place. Small bilateral effusions.  No airspace consolidation or mass.        < end of copied text >

## 2018-06-27 NOTE — PROCEDURE NOTE - PROCEDURE
<<-----Click on this checkbox to enter Procedure Vascular access with ultrasound guidance  06/27/2018  Patent left median vein  Active  JSTEELE  Peripheral intravenous catheter assessment  06/27/2018  #18G  1.75"  IV ns flush good heme back left median vein  Active  JSTEELE

## 2018-06-27 NOTE — PROCEDURE NOTE - NSPROCDETAILS_GEN_ALL_CORE
location identified, draped/prepped, sterile technique used/flushes easily/blood seen on insertion/dressing applied/secured in place/sterile technique, catheter placed/ultrasound utilization

## 2018-06-27 NOTE — DISCHARGE NOTE ADULT - MEDICATION SUMMARY - MEDICATIONS TO CHANGE
I will SWITCH the dose or number of times a day I take the medications listed below when I get home from the hospital:  None I will SWITCH the dose or number of times a day I take the medications listed below when I get home from the hospital:    ferrous sulfate 325 mg (65 mg elemental iron) oral tablet  -- 1 tab(s) by mouth 3 times a day

## 2018-06-27 NOTE — DISCHARGE NOTE ADULT - FUNCTIONAL SCREEN CURRENT LEVEL: DRESSING, MLM
Verified Results  (1) PT WITH INR [id number] 02:51PM Mary Marks   REPORT COMMENT:  FASTING:UNKNOWN     Test Name Result Flag Reference   INR 2 9 H    Reference Range                     0 9-1 1  Moderate-intensity Warfarin Therapy 2 0-3 0  Higher-intensity Warfarin Therapy   3 0-4 0   PT 29 4 sec H 9 0-11 5   For more information on this test, go to:  [email or web address]
(0) independent

## 2018-06-27 NOTE — DISCHARGE NOTE ADULT - ADDITIONAL INSTRUCTIONS
follow up with Dr Infante in 2-3 days after discharge   Please also see your GI and Oncology doctors as per your routine f/up

## 2018-06-27 NOTE — DISCHARGE NOTE ADULT - CARE PROVIDERS DIRECT ADDRESSES
,DirectAddress_Unknown,[email or web address] ,[email or web address],[email or web address],DirectAddress_Unknown

## 2018-06-27 NOTE — DISCHARGE NOTE ADULT - CARE PLAN
Principal Discharge DX:	Esophageal dysphagia  Goal:	Improved s/p stent  Assessment and plan of treatment:	Continue current medications as prescribed.  Follow up with primary doctor.  Follow up with GI in 1 week.  Follow up with oncology in 1 week.  Secondary Diagnosis:	Essential hypertension  Assessment and plan of treatment:	Continue current medications as prescribed.  Follow up with primary doctor.  Secondary Diagnosis:	High cholesterol  Assessment and plan of treatment:	Continue current medications as prescribed.  Follow up with primary doctor.  Secondary Diagnosis:	Hypothyroid  Assessment and plan of treatment:	Continue current medications as prescribed.  Follow up with primary doctor.  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 Principal Discharge DX:	Esophageal dysphagia  Goal:	Improved s/p stent  Assessment and plan of treatment:	stent placed on 6/25   -started on ppi   -tolerating diet   -we recommend that you drink 1-2 ensures a day   Follow up with primary doctor.  Follow up with GI in 1 week.  Follow up with oncology in 1 week.  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  Please continue to eat mechanical soft diet.   Dietary education requested. Educational information on mechanical soft diet

## 2018-06-27 NOTE — DISCHARGE NOTE ADULT - OTHER SIGNIFICANT FINDINGS
9.9    9.4   )-----------( 271      ( 27 Jun 2018 06:09 )             31.1   06-27    143  |  103  |  6.0<L>  ----------------------------<  91  3.9   |  30.0<H>  |  0.76    Ca    8.7      27 Jun 2018 06:09    < from: CT Abdomen and Pelvis w/ IV Cont (06.20.18 @ 14:23) >  Circumferential thickening of the mid to lower esophagus over a 7.9 cm in   length corresponding to neoplasm on esophagram.    Subcarinal and gastrohepatic metastatic lymphadenopathy.    No other sites of disease in the chest, abdomen, or pelvis.    < end of copied text >

## 2018-06-27 NOTE — DISCHARGE NOTE ADULT - CARE PROVIDER_API CALL
Naresh In Mahesh WEST), Medicine  [facility] [place] [address]  Phone: [phone]  Fax: [phone]    Paulo Byrson), Gastroenterology; Internal Medicine  [address] [address]  Phone: [phone]  Fax: [phone]    Jose Ruiz), Surgery; Surgical Oncology  [address] [place] [address]  Phone: [phone]  Fax: [phone] Naresh In Mahesh WEST), Medicine  [address] [place] [address]  Phone: [phone]  Fax: [phone]    Paulo Bryson), Gastroenterology; Internal Medicine  [address] [facility]  [address]  [place] [address]  Phone: [phone]  Fax: [phone]    Jose Ruiz), Surgery; Surgical Oncology  [address] [place] [address]  Phone: [phone]  Fax: [phone]    Sruthi Freeman), Hematology; Internal Medicine; Medical Oncology  [address] [place] [address]  Phone: [phone]  Fax: [phone] Naresh In Mahesh WEST), Medicine  [address] [place] [address]  Phone: [phone]  Fax: [phone]    Paulo Bryson), Gastroenterology; Internal Medicine  [address] [facility]  [address]  [place] [address]  Phone: [phone]  Fax: [phone]    Jose Ruiz), Surgery; Surgical Oncology  [address] [place] [address]  Phone: [phone]  Fax: [phone]    Herlinda Delacruz), Hematology; Medical Oncology  [address] [facility]  [address]  [place] [address]  Phone: [phone]  Fax: [phone]

## 2018-06-27 NOTE — DISCHARGE NOTE ADULT - PATIENT PORTAL LINK FT
You can access the [facility] Patient Portal, offered by [facility], by registering with the following website: [email or web address]

## 2018-06-27 NOTE — DISCHARGE NOTE ADULT - PROVIDER TOKENS
TOKEN:'[id number]',TOKEN:'[id number]',TOKEN:'[id number]' TOKEN:'[id number]',TOKEN:'[id number]',TOKEN:'[id number]',TOKEN:'[id number]' TOKEN:'[id number]',TOKEN:'[id number]',TOKEN:'[id number]',TOKEN:'[id number]'

## 2018-06-27 NOTE — PROGRESS NOTE ADULT - PROBLEM SELECTOR PROBLEM 4
High cholesterol

## 2018-06-27 NOTE — PROGRESS NOTE ADULT - SUBJECTIVE AND OBJECTIVE BOX
CC: Decreased PO intake; s/p esophageal stent placement     HPI:  73 y.o. male with PMHx of HTN, HLD, recent diagnosis of esophageal CA p/w difficulty swallowing associated with tolerance of liquids only, weight loss, lethargy and chest discomfort.      INTERVAL HPI/OVERNIGHT EVENTS:    Vital Signs Last 24 Hrs  T(C): 36.8 (27 Jun 2018 07:30), Max: 37.6 (26 Jun 2018 15:42)  T(F): 98.2 (27 Jun 2018 07:30), Max: 99.7 (26 Jun 2018 20:51)  HR: 53 (27 Jun 2018 10:22) (50 - 62)  BP: 127/57 (27 Jun 2018 10:22) (124/63 - 136/58)  BP(mean): --  RR: 18 (27 Jun 2018 10:22) (18 - 18)  SpO2: 99% (27 Jun 2018 10:22) (94% - 100%)  I&O's Detail    26 Jun 2018 07:01  -  27 Jun 2018 07:00  --------------------------------------------------------  IN:    dextrose 5% + lactated ringers.: 500 mL  Total IN: 500 mL    OUT:  Total OUT: 0 mL    Total NET: 500 mL                                    9.9    9.4   )-----------( 271      ( 27 Jun 2018 06:09 )             31.1     27 Jun 2018 06:09    143    |  103    |  6.0    ----------------------------<  91     3.9     |  30.0   |  0.76     Ca    8.7        27 Jun 2018 06:09        CAPILLARY BLOOD GLUCOSE              MEDICATIONS  (STANDING):  amLODIPine   Tablet 5 milliGRAM(s) Oral daily  dextrose 5% + lactated ringers. 1000 milliLiter(s) (50 mL/Hr) IV Continuous <Continuous>  enoxaparin Injectable 40 milliGRAM(s) SubCutaneous daily  ferrous    sulfate 325 milliGRAM(s) Oral three times a day  levothyroxine 25 MICROGram(s) Oral daily  pantoprazole  Injectable 40 milliGRAM(s) IV Push two times a day  simvastatin 10 milliGRAM(s) Oral at bedtime    MEDICATIONS  (PRN):  acetaminophen   Tablet. 325 milliGRAM(s) Oral every 4 hours PRN Moderate Pain (4 - 6)      RADIOLOGY & ADDITIONAL TESTS: CC: Decreased PO intake; s/p esophageal stent placement     HPI:  73 y.o. male with PMHx of HTN, HLD, recent diagnosis of esophageal CA p/w difficulty swallowing associated with tolerance of liquids only, weight loss, lethargy and chest discomfort.      INTERVAL HPI/OVERNIGHT EVENTS: Patient seen and examined lying in bed.  Patient denies any headache, dizziness, SOB, CP, abdominal pain, nausea, vomiting, dysuria.  Other ROS reviewed and are negative.  Tolerating clears.    Vital Signs Last 24 Hrs  T(C): 36.8 (27 Jun 2018 07:30), Max: 37.6 (26 Jun 2018 15:42)  T(F): 98.2 (27 Jun 2018 07:30), Max: 99.7 (26 Jun 2018 20:51)  HR: 53 (27 Jun 2018 10:22) (50 - 62)  BP: 127/57 (27 Jun 2018 10:22) (124/63 - 136/58)  BP(mean): --  RR: 18 (27 Jun 2018 10:22) (18 - 18)  SpO2: 99% (27 Jun 2018 10:22) (94% - 100%)  I&O's Detail    26 Jun 2018 07:01  -  27 Jun 2018 07:00  --------------------------------------------------------  IN:    dextrose 5% + lactated ringers.: 500 mL  Total IN: 500 mL    OUT:  Total OUT: 0 mL    Total NET: 500 mL      PHYSICAL EXAM:  GENERAL: NAD, cachetic  HEAD:  Atraumatic, Normocephalic  NECK: Supple, No JVD, Normal thyroid  NERVOUS SYSTEM:  Alert & Oriented X3, Good concentration; Motor Strength 5/5 B/L upper and lower extremities  CHEST/LUNG: Clear to auscultation bilaterally; No rales, rhonchi, wheezing, or rubs  HEART: Regular rate and rhythm; No murmurs, rubs, or gallops  ABDOMEN: Soft, Nontender, Nondistended; Bowel sounds present  EXTREMITIES:  2+ Peripheral Pulses, No clubbing, cyanosis, or edema                                9.9    9.4   )-----------( 271      ( 27 Jun 2018 06:09 )             31.1     27 Jun 2018 06:09    143    |  103    |  6.0    ----------------------------<  91     3.9     |  30.0   |  0.76     Ca    8.7        27 Jun 2018 06:09      MEDICATIONS  (STANDING):  amLODIPine   Tablet 5 milliGRAM(s) Oral daily  dextrose 5% + lactated ringers. 1000 milliLiter(s) (50 mL/Hr) IV Continuous <Continuous>  enoxaparin Injectable 40 milliGRAM(s) SubCutaneous daily  ferrous    sulfate 325 milliGRAM(s) Oral three times a day  levothyroxine 25 MICROGram(s) Oral daily  pantoprazole  Injectable 40 milliGRAM(s) IV Push two times a day  simvastatin 10 milliGRAM(s) Oral at bedtime    MEDICATIONS  (PRN):  acetaminophen   Tablet. 325 milliGRAM(s) Oral every 4 hours PRN Moderate Pain (4 - 6)      RADIOLOGY & ADDITIONAL TESTS:  < from: Xray Chest 2 Views PA/Lat (06.26.18 @ 09:10) >     EXAM:  XR CHEST PA LAT 2V                          PROCEDURE DATE:  06/26/2018          INTERPRETATION:  PA and lateral chest radiographs     COMPARISON:  NONE.    CLINICAL INFORMATION: Chest Pain.    FINDINGS:  A distal esophageal stent in place in patient with known history of   squamous cell carcinoma of the esophagus. The small bilateral pleural   effusions on lateral view. No airspace consolidation seen. No gross   evidence of widened mediastinum identified.  The airway is midline.  Thereare no airspace consolidations.  .   The heart size is within the limits of normal.   The visualized osseus structures are intact.     IMPRESSION:  Esophageal stent in place. Small bilateral effusions.  No airspace consolidation or mass.                CLOVER LAINEZ M.D., ATTENDING RADIOLOGIST  This document has been electronically signed. Jun 26 2018  9:23AM              < end of copied text >    < from: Xray Abdomen 2 View PORTABLE -Routine (06.26.18 @ 09:10) >   EXAM:  XR ABDOMEN PORTABLE ROUTINE 2V                          PROCEDURE DATE:  06/26/2018          INTERPRETATION:  KUB: AP and upright    COMPARISON: None.    CLINICAL INFORMATION: Status post esophageal stent placement for squamous   cell carcinoma of the esophagus..    FINDINGS:  Distal esophageal stent in place. The bowel gas pattern is within normal   limits. Stomach is distended with air and fluid.  There is no free intra-abdominal air.  There are no obvious intra-abdominal masses.  There are no abnormal calcifications.   The osseous structures are intact.     IMPRESSION:  Distal esophageal stent in place.  No acute intra-abdominal findings.                CLOVER LAINEZ M.D., ATTENDING RADIOLOGIST  This document has been electronically signed. Jun 26 2018  9:20AM        < end of copied text >

## 2018-06-27 NOTE — DISCHARGE NOTE ADULT - HOSPITAL COURSE
73 y.o. male with PMHx of HTN, HLD, recent diagnosis of esophageal CA p/w difficulty swallowing associated with tolerance of liquids only, weight loss, lethargy and chest discomfort. CT chest/abd/pelvis with thickening of mid to lower esophagus over 7.9cm; and subcarinal and gastrohepatic metastatic lymphadenopathy.  GI and surgical oncology consulted.  S/p dilatation and stent 6/25/18. Diet advanced and tolerating. 73 y.o. male with PMHx of HTN, HLD, recent diagnosis of esophageal CA p/w difficulty swallowing associated with tolerance of liquids only, weight loss, lethargy and chest discomfort. CT chest/abd/pelvis with thickening of mid to lower esophagus over 7.9cm; and subcarinal and gastrohepatic metastatic lymphadenopathy.  GI and surgical oncology consulted.  S/p dilatation and stent 6/25/18. Diet advanced and tolerating.      Pt seen and examined with NP. A&P reviewed.   Medically stable for DC if tolerated diet for further f/u with Oncology for treatment 73 y.o. male with PMHx of HTN, HLD, recent diagnosis of esophageal CA p/w difficulty swallowing associated with tolerance of liquids only, weight loss, lethargy and chest discomfort. CT chest/abd/pelvis with thickening of mid to lower esophagus over 7.9cm; and subcarinal and gastrohepatic metastatic lymphadenopathy.  GI and surgical oncology consulted.  S/p dilatation and stent 6/25/18. Diet advanced and tolerating.      Started on PPI. Rec to f/up with GI, and Oncology on discharge.   Medically stable for DC if tolerated diet for further f/u with Oncology for treatment.    Plan d/w patient at bedside   Total time coordinating this discharge was 40 minutes. 73 y.o. male with PMHx of HTN, HLD, recent diagnosis of esophageal CA p/w difficulty swallowing associated with tolerance of liquids only, weight loss, lethargy and chest discomfort. CT chest/abd/pelvis with thickening of mid to lower esophagus over 7.9cm; and subcarinal and gastrohepatic metastatic lymphadenopathy.  GI and surgical oncology consulted.  S/p dilatation and stent 6/25/18. Diet advanced and tolerating.      Started on PPI. Rec to f/up with GI, and Oncology on discharge. Patient sees Dr Delacruz outpatient and wants to follow up with them as OP   Medically stable for DC if tolerated diet for further f/u with Oncology for treatment.    Plan d/w patient at bedside   Total time coordinating this discharge was 40 minutes.

## 2018-06-27 NOTE — PROGRESS NOTE ADULT - PROBLEM SELECTOR PLAN 1
s/p stent placement and doing well. Will advance to mechanical diet. Suggest dietary consult so patient can be educated about mechanical diet. If tolerates and continue to do well can D/C from GI perspective.

## 2018-06-27 NOTE — DISCHARGE NOTE ADULT - PLAN OF CARE
Improved s/p stent Continue current medications as prescribed.  Follow up with primary doctor.  Follow up with GI in 1 week.  Follow up with oncology in 1 week. Continue current medications as prescribed.  Follow up with primary doctor. Follow up with oncologist. stent placed on 6/25   -started on ppi   -tolerating diet   -we recommend that you drink 1-2 ensures a day   Follow up with primary doctor.  Follow up with GI in 1 week.  Follow up with oncology in 1 week. c/w home medications   Please check your Blood pressure at home and if the top number is always over 150 or if the bottom number is always over 100, please call your doctor c/w home medications Follow up with oncologist.  Please continue to eat mechanical soft diet.   Dietary education requested. Educational information on mechanical soft diet

## 2018-06-27 NOTE — PROGRESS NOTE ADULT - PROBLEM SELECTOR PLAN 1
esophageal CA - s/p CT, esophagram, s/p dilatation with stent placement 6/25/18  Tolerating clears.  Diet advanced to mechanical soft.

## 2018-06-28 VITALS
SYSTOLIC BLOOD PRESSURE: 152 MMHG | TEMPERATURE: 97 F | DIASTOLIC BLOOD PRESSURE: 67 MMHG | RESPIRATION RATE: 18 BRPM | OXYGEN SATURATION: 100 % | HEART RATE: 53 BPM

## 2018-06-28 PROCEDURE — 84134 ASSAY OF PREALBUMIN: CPT

## 2018-06-28 PROCEDURE — 82378 CARCINOEMBRYONIC ANTIGEN: CPT

## 2018-06-28 PROCEDURE — 99285 EMERGENCY DEPT VISIT HI MDM: CPT | Mod: 25

## 2018-06-28 PROCEDURE — 85610 PROTHROMBIN TIME: CPT

## 2018-06-28 PROCEDURE — 80053 COMPREHEN METABOLIC PANEL: CPT

## 2018-06-28 PROCEDURE — 96375 TX/PRO/DX INJ NEW DRUG ADDON: CPT

## 2018-06-28 PROCEDURE — 93005 ELECTROCARDIOGRAM TRACING: CPT

## 2018-06-28 PROCEDURE — 84145 PROCALCITONIN (PCT): CPT

## 2018-06-28 PROCEDURE — 96374 THER/PROPH/DIAG INJ IV PUSH: CPT

## 2018-06-28 PROCEDURE — 71260 CT THORAX DX C+: CPT

## 2018-06-28 PROCEDURE — 80048 BASIC METABOLIC PNL TOTAL CA: CPT

## 2018-06-28 PROCEDURE — 83690 ASSAY OF LIPASE: CPT

## 2018-06-28 PROCEDURE — 81001 URINALYSIS AUTO W/SCOPE: CPT

## 2018-06-28 PROCEDURE — 84484 ASSAY OF TROPONIN QUANT: CPT

## 2018-06-28 PROCEDURE — C1874: CPT

## 2018-06-28 PROCEDURE — C1769: CPT

## 2018-06-28 PROCEDURE — 74019 RADEX ABDOMEN 2 VIEWS: CPT

## 2018-06-28 PROCEDURE — 74177 CT ABD & PELVIS W/CONTRAST: CPT

## 2018-06-28 PROCEDURE — 36415 COLL VENOUS BLD VENIPUNCTURE: CPT

## 2018-06-28 PROCEDURE — 85027 COMPLETE CBC AUTOMATED: CPT

## 2018-06-28 PROCEDURE — 74220 X-RAY XM ESOPHAGUS 1CNTRST: CPT

## 2018-06-28 PROCEDURE — 71046 X-RAY EXAM CHEST 2 VIEWS: CPT

## 2018-06-28 PROCEDURE — 85730 THROMBOPLASTIN TIME PARTIAL: CPT

## 2018-06-28 PROCEDURE — 71045 X-RAY EXAM CHEST 1 VIEW: CPT

## 2018-06-28 PROCEDURE — 83735 ASSAY OF MAGNESIUM: CPT

## 2018-06-28 PROCEDURE — 99239 HOSP IP/OBS DSCHRG MGMT >30: CPT

## 2018-06-28 PROCEDURE — 76000 FLUOROSCOPY <1 HR PHYS/QHP: CPT

## 2018-06-28 PROCEDURE — 99232 SBSQ HOSP IP/OBS MODERATE 35: CPT

## 2018-06-28 PROCEDURE — T1013: CPT

## 2018-06-28 RX ORDER — FERROUS SULFATE 325(65) MG
1 TABLET ORAL
Qty: 0 | Refills: 0 | COMMUNITY

## 2018-06-28 RX ORDER — PANTOPRAZOLE SODIUM 20 MG/1
1 TABLET, DELAYED RELEASE ORAL
Qty: 60 | Refills: 0 | OUTPATIENT
Start: 2018-06-28 | End: 2018-07-27

## 2018-06-28 RX ORDER — PANTOPRAZOLE SODIUM 20 MG/1
40 TABLET, DELAYED RELEASE ORAL
Qty: 0 | Refills: 0 | Status: DISCONTINUED | OUTPATIENT
Start: 2018-06-28 | End: 2018-06-28

## 2018-06-28 RX ADMIN — Medication 325 MILLIGRAM(S): at 16:15

## 2018-06-28 RX ADMIN — Medication 25 MICROGRAM(S): at 05:14

## 2018-06-28 RX ADMIN — PANTOPRAZOLE SODIUM 40 MILLIGRAM(S): 20 TABLET, DELAYED RELEASE ORAL at 17:42

## 2018-06-28 RX ADMIN — ENOXAPARIN SODIUM 40 MILLIGRAM(S): 100 INJECTION SUBCUTANEOUS at 11:21

## 2018-06-28 RX ADMIN — AMLODIPINE BESYLATE 5 MILLIGRAM(S): 2.5 TABLET ORAL at 05:14

## 2018-06-28 RX ADMIN — PANTOPRAZOLE SODIUM 40 MILLIGRAM(S): 20 TABLET, DELAYED RELEASE ORAL at 05:14

## 2018-06-28 RX ADMIN — Medication 325 MILLIGRAM(S): at 05:14

## 2018-06-28 NOTE — PROGRESS NOTE ADULT - SUBJECTIVE AND OBJECTIVE BOX
Pt seen and examined f/u for S/P sesophageal stent placement for esophageal cancer  He is tolerating the mechanical diet. Dietary consult to advise him about mechanical diet not done. Mild epigastric discomfort this morning    REVIEW OF SYSTEMS:    CONSTITUTIONAL: No fever, weight loss, or fatigue  EYES: No eye pain, visual disturbances, or discharge  ENMT:  No difficulty hearing, tinnitus, vertigo; No sinus or throat pain  RESPIRATORY: No cough, wheezing, chills or hemoptysis; No shortness of breath  CARDIOVASCULAR: No chest pain, palpitations, dizziness, or leg swelling  GASTROINTESTINAL: No abdominal or epigastric pain. No nausea, vomiting, or hematemesis; No diarrhea or constipation. No melena or hematochezia.    MEDICATIONS:  MEDICATIONS  (STANDING):  amLODIPine   Tablet 5 milliGRAM(s) Oral daily  dextrose 5% + lactated ringers. 1000 milliLiter(s) (50 mL/Hr) IV Continuous <Continuous>  enoxaparin Injectable 40 milliGRAM(s) SubCutaneous daily  ferrous    sulfate 325 milliGRAM(s) Oral three times a day  levothyroxine 25 MICROGram(s) Oral daily  pantoprazole  Injectable 40 milliGRAM(s) IV Push two times a day  simvastatin 10 milliGRAM(s) Oral at bedtime    MEDICATIONS  (PRN):  acetaminophen   Tablet. 325 milliGRAM(s) Oral every 4 hours PRN Moderate Pain (4 - 6)      Allergies    No Known Allergies    Intolerances        Vital Signs Last 24 Hrs  T(C): 36.8 (28 Jun 2018 05:04), Max: 36.8 (27 Jun 2018 07:30)  T(F): 98.3 (28 Jun 2018 05:04), Max: 98.3 (28 Jun 2018 05:04)  HR: 52 (28 Jun 2018 05:04) (50 - 58)  BP: 138/66 (28 Jun 2018 05:04) (124/63 - 138/66)  BP(mean): --  RR: 18 (28 Jun 2018 05:04) (18 - 18)  SpO2: 97% (28 Jun 2018 05:04) (97% - 99%)    06-27 @ 07:01  -  06-28 @ 07:00  --------------------------------------------------------  IN: 750 mL / OUT: 0 mL / NET: 750 mL        PHYSICAL EXAM:    General: Well developed; well nourished; in no acute distress  HEENT: MMM, conjunctiva and sclera clear  Gastrointestinal:Abdomen: Soft non-tender non-distended; Normal bowel sounds; No hepatosplenomegaly  Extremities: no cyanosis, clubbing or edema.  Skin: Warm and dry. No obvious rash    LABS:      CBC Full  -  ( 27 Jun 2018 06:09 )  WBC Count : 9.4 K/uL  Hemoglobin : 9.9 g/dL  Hematocrit : 31.1 %  Platelet Count - Automated : 271 K/uL  Mean Cell Volume : 84.5 fl  Mean Cell Hemoglobin : 26.9 pg  Mean Cell Hemoglobin Concentration : 31.8 g/dL  Auto Neutrophil # : x  Auto Lymphocyte # : x  Auto Monocyte # : x  Auto Eosinophil # : x  Auto Basophil # : x  Auto Neutrophil % : x  Auto Lymphocyte % : x  Auto Monocyte % : x  Auto Eosinophil % : x  Auto Basophil % : x    06-27    143  |  103  |  6.0<L>  ----------------------------<  91  3.9   |  30.0<H>  |  0.76    Ca    8.7      27 Jun 2018 06:09                        RADIOLOGY & ADDITIONAL STUDIES (The following images were personally reviewed):

## 2018-06-28 NOTE — PROGRESS NOTE ADULT - PROBLEM SELECTOR PLAN 2
synthroid
tolerating mechanical diet s/p stent. Will change pantoprazole to 40mg PO BID and get dietary consult to train patient for mechanical diet to prevent stent obstruction. Will see only prn your request while in hospital
Secondary to the above esophageal cancer. See above management plan.
as above- for eventual radiation and chemo
due to squamous cell cancer of esophagus
squamous cell cancer- will order cat scan of abdomen and pelvis as well as chest.
synthroid

## 2018-06-28 NOTE — PROGRESS NOTE ADULT - PROBLEM SELECTOR PROBLEM 2
Acquired hypothyroidism
Esophageal dysphagia
Acquired hypothyroidism
Dysphagia, unspecified type
Esophageal mass
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
Pharyngoesophageal dysphagia
Acquired hypothyroidism

## 2018-06-28 NOTE — CHART NOTE - NSCHARTNOTEFT_GEN_A_CORE
Nutrition consult:     Aware pt to be discharged later today. Provided Dysphagia 2 mechanically altered nutrition therapy diet education. Literature provided. RD to remain available PRN.

## 2018-06-28 NOTE — PROGRESS NOTE ADULT - ASSESSMENT
Patient is a 73 y.o. male with PMHx of HTN, HLD, recent diagnosis of esophageal CA p/w difficulty swallowing associated with tolerance of liquids only, weight loss, lethargy and chest discomfort. CT chest/abd/pelvis with thickening of mid to lower esophagus over 7.9cm; and subcarinal and gastrohepatic metastatic lymphadenopathy.  S/p dilatation and stent 6/25/1      1. Esophageal mass due to esophageal CA -  -s/p CT, esophagram, s/p dilatation with stent placement 6/25/18  -Tolerating diet   -ppi bid   -f/up gi as OP     2.  Acquired hypothyroidism.    - c/w  synthroid.     3. Essential hypertension.    -c/w norvasc and monitor bp     4. normocyctic anemia likely due to aocd  -stable and no e/o acute bleeding .     5. hld - c/w statin     6. GI ppx - ppi     7. dispo - to home today     Please see discharge papers for details.

## 2018-06-28 NOTE — PROGRESS NOTE ADULT - PROBLEM SELECTOR PROBLEM 1
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
Dysphagia, unspecified type
Esophageal dysphagia
Esophageal dysphagia
Esophageal mass

## 2018-06-28 NOTE — PROGRESS NOTE ADULT - SUBJECTIVE AND OBJECTIVE BOX
CC: Decreased PO intake; s/p esophageal stent placement     Patient seen and examined lying in bed.  Spoke to with  at bedside. No c/o pain. States that he is tolerating diet.     ROS: Patient denies any headache, dizziness, SOB, CP, abdominal pain, nausea, vomiting, dysuria.  Other ROS reviewed and are negative.     Vital Signs Last 24 Hrs  T(C): 36.9 (28 Jun 2018 07:45), Max: 36.9 (28 Jun 2018 07:45)  T(F): 98.5 (28 Jun 2018 07:45), Max: 98.5 (28 Jun 2018 07:45)  HR: 56 (28 Jun 2018 07:45) (52 - 58)  BP: 138/65 (28 Jun 2018 07:45) (132/65 - 138/66)  BP(mean): --  RR: 18 (28 Jun 2018 07:45) (18 - 18)  SpO2: 97% (28 Jun 2018 07:45) (97% - 97%)    PHYSICAL EXAM:  GENERAL: NAD, cachetic  HEAD:  Atraumatic, Normocephalic  NECK: Supple, No JVD, Normal thyroid  NERVOUS SYSTEM:  Alert & Oriented X3, Good concentration; Motor Strength 5/5 B/L upper and lower extremities  CHEST/LUNG: Clear to auscultation bilaterally; No rales, rhonchi, wheezing, or rubs  HEART: Regular rate and rhythm; No murmurs, rubs, or gallops  ABDOMEN: Soft, Nontender, Nondistended; Bowel sounds present  EXTREMITIES:  2+ Peripheral Pulses, No clubbing, cyanosis, or edema      LABS:                                    9.9    9.4   )-----------( 271      ( 27 Jun 2018 06:09 )             31.1   06-27    143  |  103  |  6.0<L>  ----------------------------<  91  3.9   |  30.0<H>  |  0.76    Ca    8.7      27 Jun 2018 06:09      MEDICATIONS  (STANDING):  amLODIPine   Tablet 5 milliGRAM(s) Oral daily  enoxaparin Injectable 40 milliGRAM(s) SubCutaneous daily  ferrous    sulfate 325 milliGRAM(s) Oral three times a day  levothyroxine 25 MICROGram(s) Oral daily  pantoprazole    Tablet 40 milliGRAM(s) Oral two times a day  simvastatin 10 milliGRAM(s) Oral at bedtime    MEDICATIONS  (PRN):  acetaminophen   Tablet. 325 milliGRAM(s) Oral every 4 hours PRN Moderate Pain (4 - 6)        INTERPRETATION:  PA and lateral chest radiographs     COMPARISON:  NONE.    CLINICAL INFORMATION: Chest Pain.    FINDINGS:  A distal esophageal stent in place in patient with known history of   squamous cell carcinoma of the esophagus. The small bilateral pleural   effusions on lateral view. No airspace consolidation seen. No gross   evidence of widened mediastinum identified.  The airway is midline.  Thereare no airspace consolidations.  .   The heart size is within the limits of normal.   The visualized osseus structures are intact.     IMPRESSION:  Esophageal stent in place. Small bilateral effusions.  No airspace consolidation or mass.                CLOVER LAINEZ M.D., ATTENDING RADIOLOGIST  This document has been electronically signed. Jun 26 2018  9:23AM              < end of copied text >    < from: Xray Abdomen 2 View PORTABLE -Routine (06.26.18 @ 09:10) >   EXAM:  XR ABDOMEN PORTABLE ROUTINE 2V                          PROCEDURE DATE:  06/26/2018          INTERPRETATION:  KUB: AP and upright    COMPARISON: None.    CLINICAL INFORMATION: Status post esophageal stent placement for squamous   cell carcinoma of the esophagus..    FINDINGS:  Distal esophageal stent in place. The bowel gas pattern is within normal   limits. Stomach is distended with air and fluid.  There is no free intra-abdominal air.  There are no obvious intra-abdominal masses.  There are no abnormal calcifications.   The osseous structures are intact.     IMPRESSION:  Distal esophageal stent in place.  No acute intra-abdominal findings.

## 2018-06-28 NOTE — PROGRESS NOTE ADULT - PROVIDER SPECIALTY LIST ADULT
Anesthesia
Gastroenterology
Hospitalist
Surgery
Thoracic Surgery
Gastroenterology
Gastroenterology
Hospitalist
Hospitalist

## 2018-06-29 PROBLEM — E78.00 PURE HYPERCHOLESTEROLEMIA, UNSPECIFIED: Chronic | Status: ACTIVE | Noted: 2018-06-18

## 2018-07-05 NOTE — CDI QUERY NOTE - NSCDIOTHERTXTBX_GEN_ALL_CORE_HH
MALNUTRITION    Pt a/w dysphagia, found to have new esophageal cancer. Decreased PO, wt. loss, cachectic and FTT documented.  Ensure clear tid was ordered  Nutrition Consult 6/24= severe protein calorie malnutrition    Please review nutrition consult and if you agree, please sign and or make addendum to dc summary.

## 2018-07-12 ENCOUNTER — APPOINTMENT (OUTPATIENT)
Dept: GASTROENTEROLOGY | Facility: CLINIC | Age: 73
End: 2018-07-12
Payer: MEDICARE

## 2018-07-12 VITALS
DIASTOLIC BLOOD PRESSURE: 60 MMHG | HEIGHT: 62 IN | WEIGHT: 104 LBS | BODY MASS INDEX: 19.14 KG/M2 | OXYGEN SATURATION: 99 % | SYSTOLIC BLOOD PRESSURE: 135 MMHG

## 2018-07-12 DIAGNOSIS — R13.10 DYSPHAGIA, UNSPECIFIED: ICD-10-CM

## 2018-07-12 PROCEDURE — 99214 OFFICE O/P EST MOD 30 MIN: CPT

## 2018-07-13 ENCOUNTER — OUTPATIENT (OUTPATIENT)
Dept: OUTPATIENT SERVICES | Facility: HOSPITAL | Age: 73
LOS: 1 days | Discharge: HOME | End: 2018-07-13
Payer: MEDICARE

## 2018-07-13 DIAGNOSIS — Z85.89 PERSONAL HISTORY OF MALIGNANT NEOPLASM OF OTHER ORGANS AND SYSTEMS: Chronic | ICD-10-CM

## 2018-07-17 ENCOUNTER — APPOINTMENT (OUTPATIENT)
Dept: RADIATION ONCOLOGY | Facility: CLINIC | Age: 73
End: 2018-07-17
Payer: MEDICARE

## 2018-07-17 VITALS
WEIGHT: 106.2 LBS | HEART RATE: 65 BPM | TEMPERATURE: 98.4 F | RESPIRATION RATE: 16 BRPM | SYSTOLIC BLOOD PRESSURE: 134 MMHG | BODY MASS INDEX: 19.42 KG/M2 | OXYGEN SATURATION: 98 % | DIASTOLIC BLOOD PRESSURE: 70 MMHG

## 2018-07-17 DIAGNOSIS — Z92.3 PERSONAL HISTORY OF IRRADIATION: ICD-10-CM

## 2018-07-17 DIAGNOSIS — Z85.819 PERSONAL HISTORY OF MALIGNANT NEOPLASM OF UNSPECIFIED SITE OF LIP, ORAL CAVITY, AND PHARYNX: ICD-10-CM

## 2018-07-17 PROCEDURE — 99215 OFFICE O/P EST HI 40 MIN: CPT | Mod: 25

## 2018-07-17 PROCEDURE — 77263 THER RADIOLOGY TX PLNG CPLX: CPT

## 2018-07-25 ENCOUNTER — OUTPATIENT (OUTPATIENT)
Dept: OUTPATIENT SERVICES | Facility: HOSPITAL | Age: 73
LOS: 1 days | End: 2018-07-25
Payer: COMMERCIAL

## 2018-07-25 VITALS
HEART RATE: 60 BPM | RESPIRATION RATE: 16 BRPM | HEIGHT: 62 IN | SYSTOLIC BLOOD PRESSURE: 150 MMHG | TEMPERATURE: 98 F | DIASTOLIC BLOOD PRESSURE: 71 MMHG | WEIGHT: 103.62 LBS

## 2018-07-25 DIAGNOSIS — I10 ESSENTIAL (PRIMARY) HYPERTENSION: ICD-10-CM

## 2018-07-25 DIAGNOSIS — Z01.818 ENCOUNTER FOR OTHER PREPROCEDURAL EXAMINATION: ICD-10-CM

## 2018-07-25 DIAGNOSIS — R13.10 DYSPHAGIA, UNSPECIFIED: ICD-10-CM

## 2018-07-25 DIAGNOSIS — Z85.89 PERSONAL HISTORY OF MALIGNANT NEOPLASM OF OTHER ORGANS AND SYSTEMS: Chronic | ICD-10-CM

## 2018-07-25 DIAGNOSIS — E78.5 HYPERLIPIDEMIA, UNSPECIFIED: ICD-10-CM

## 2018-07-25 DIAGNOSIS — C15.4 MALIGNANT NEOPLASM OF MIDDLE THIRD OF ESOPHAGUS: ICD-10-CM

## 2018-07-25 DIAGNOSIS — C32.0 MALIGNANT NEOPLASM OF GLOTTIS: ICD-10-CM

## 2018-07-25 DIAGNOSIS — Z92.3 PERSONAL HISTORY OF IRRADIATION: ICD-10-CM

## 2018-07-25 LAB
ALBUMIN SERPL ELPH-MCNC: 4.4 G/DL — SIGNIFICANT CHANGE UP (ref 3.3–5.2)
ALP SERPL-CCNC: 83 U/L — SIGNIFICANT CHANGE UP (ref 40–120)
ALT FLD-CCNC: 11 U/L — SIGNIFICANT CHANGE UP
ANION GAP SERPL CALC-SCNC: 15 MMOL/L — SIGNIFICANT CHANGE UP (ref 5–17)
APTT BLD: 32.1 SEC — SIGNIFICANT CHANGE UP (ref 27.5–37.4)
AST SERPL-CCNC: 17 U/L — SIGNIFICANT CHANGE UP
BASOPHILS # BLD AUTO: 0 K/UL — SIGNIFICANT CHANGE UP (ref 0–0.2)
BASOPHILS NFR BLD AUTO: 0.3 % — SIGNIFICANT CHANGE UP (ref 0–2)
BILIRUB SERPL-MCNC: 0.3 MG/DL — LOW (ref 0.4–2)
BUN SERPL-MCNC: 12 MG/DL — SIGNIFICANT CHANGE UP (ref 8–20)
CALCIUM SERPL-MCNC: 10.4 MG/DL — HIGH (ref 8.6–10.2)
CHLORIDE SERPL-SCNC: 95 MMOL/L — LOW (ref 98–107)
CO2 SERPL-SCNC: 29 MMOL/L — SIGNIFICANT CHANGE UP (ref 22–29)
CREAT SERPL-MCNC: 0.7 MG/DL — SIGNIFICANT CHANGE UP (ref 0.5–1.3)
EOSINOPHIL # BLD AUTO: 0.1 K/UL — SIGNIFICANT CHANGE UP (ref 0–0.5)
EOSINOPHIL NFR BLD AUTO: 0.7 % — SIGNIFICANT CHANGE UP (ref 0–5)
GLUCOSE SERPL-MCNC: 103 MG/DL — SIGNIFICANT CHANGE UP (ref 70–115)
HCT VFR BLD CALC: 35.1 % — LOW (ref 42–52)
HGB BLD-MCNC: 11.1 G/DL — LOW (ref 14–18)
INR BLD: 1.14 RATIO — SIGNIFICANT CHANGE UP (ref 0.88–1.16)
LYMPHOCYTES # BLD AUTO: 1 K/UL — SIGNIFICANT CHANGE UP (ref 1–4.8)
LYMPHOCYTES # BLD AUTO: 9.4 % — LOW (ref 20–55)
MCHC RBC-ENTMCNC: 26.7 PG — LOW (ref 27–31)
MCHC RBC-ENTMCNC: 31.6 G/DL — LOW (ref 32–36)
MCV RBC AUTO: 84.4 FL — SIGNIFICANT CHANGE UP (ref 80–94)
MONOCYTES # BLD AUTO: 0.8 K/UL — SIGNIFICANT CHANGE UP (ref 0–0.8)
MONOCYTES NFR BLD AUTO: 7.3 % — SIGNIFICANT CHANGE UP (ref 3–10)
NEUTROPHILS # BLD AUTO: 8.5 K/UL — HIGH (ref 1.8–8)
NEUTROPHILS NFR BLD AUTO: 82.1 % — HIGH (ref 37–73)
PLATELET # BLD AUTO: 374 K/UL — SIGNIFICANT CHANGE UP (ref 150–400)
POTASSIUM SERPL-MCNC: 5.6 MMOL/L — HIGH (ref 3.5–5.3)
POTASSIUM SERPL-SCNC: 5.6 MMOL/L — HIGH (ref 3.5–5.3)
PROT SERPL-MCNC: 8.7 G/DL — SIGNIFICANT CHANGE UP (ref 6.6–8.7)
PROTHROM AB SERPL-ACNC: 12.6 SEC — SIGNIFICANT CHANGE UP (ref 9.8–12.7)
RBC # BLD: 4.16 M/UL — LOW (ref 4.6–6.2)
RBC # FLD: 14.9 % — SIGNIFICANT CHANGE UP (ref 11–15.6)
SODIUM SERPL-SCNC: 139 MMOL/L — SIGNIFICANT CHANGE UP (ref 135–145)
WBC # BLD: 10.3 K/UL — SIGNIFICANT CHANGE UP (ref 4.8–10.8)
WBC # FLD AUTO: 10.3 K/UL — SIGNIFICANT CHANGE UP (ref 4.8–10.8)

## 2018-07-25 PROCEDURE — G0463: CPT

## 2018-07-25 PROCEDURE — T1013: CPT

## 2018-07-25 PROCEDURE — 80053 COMPREHEN METABOLIC PANEL: CPT

## 2018-07-25 PROCEDURE — 85610 PROTHROMBIN TIME: CPT

## 2018-07-25 PROCEDURE — 36415 COLL VENOUS BLD VENIPUNCTURE: CPT

## 2018-07-25 PROCEDURE — 85730 THROMBOPLASTIN TIME PARTIAL: CPT

## 2018-07-25 PROCEDURE — 85027 COMPLETE CBC AUTOMATED: CPT

## 2018-07-25 RX ORDER — SODIUM CHLORIDE 9 MG/ML
3 INJECTION INTRAMUSCULAR; INTRAVENOUS; SUBCUTANEOUS ONCE
Qty: 0 | Refills: 0 | Status: DISCONTINUED | OUTPATIENT
Start: 2018-07-31 | End: 2018-08-15

## 2018-07-25 RX ORDER — CEFAZOLIN SODIUM 1 G
2000 VIAL (EA) INJECTION ONCE
Qty: 0 | Refills: 0 | Status: DISCONTINUED | OUTPATIENT
Start: 2018-07-31 | End: 2018-08-15

## 2018-07-25 NOTE — ASU PATIENT PROFILE, ADULT - LEARNING ASSESSMENT (PATIENT) ADDITIONAL COMMENTS
Instructed pt verbally in [place] via In House  and in writing in [place] on pre-op instructions, tips for safer surgery, pain management scale, pre-surgical infection prevention instructions, med. clear - Dr In [facility], understanding of all information verablized.

## 2018-07-25 NOTE — H&P PST ADULT - FAMILY HISTORY
Father  Still living? No  Family history of stomach cancer, Age at diagnosis: Age Unknown     Sibling  Still living? No  Family history of stomach cancer, Age at diagnosis: Age Unknown

## 2018-07-25 NOTE — H&P PST ADULT - HISTORY OF PRESENT ILLNESS
74 yo male with esophageal cancer and cancer of the glottis, diagnosed with cancer in 2007, underwent chemo and radiation now recurrence, planning to undergo chemotherapy and requires port placement.

## 2018-07-25 NOTE — H&P PST ADULT - NSANTHOSAYNRD_GEN_A_CORE
No. ASHWINI screening performed.  STOP BANG Legend: 0-2 = LOW Risk; 3-4 = INTERMEDIATE Risk; 5-8 = HIGH Risk

## 2018-07-26 PROCEDURE — 77300 RADIATION THERAPY DOSE PLAN: CPT | Mod: 26

## 2018-07-26 PROCEDURE — 77338 DESIGN MLC DEVICE FOR IMRT: CPT | Mod: 26

## 2018-07-26 PROCEDURE — 77301 RADIOTHERAPY DOSE PLAN IMRT: CPT | Mod: 26

## 2018-07-31 ENCOUNTER — OUTPATIENT (OUTPATIENT)
Dept: OUTPATIENT SERVICES | Facility: HOSPITAL | Age: 73
LOS: 1 days | End: 2018-07-31
Payer: COMMERCIAL

## 2018-07-31 VITALS
DIASTOLIC BLOOD PRESSURE: 53 MMHG | SYSTOLIC BLOOD PRESSURE: 135 MMHG | HEART RATE: 58 BPM | RESPIRATION RATE: 14 BRPM | OXYGEN SATURATION: 100 %

## 2018-07-31 VITALS
WEIGHT: 103.62 LBS | RESPIRATION RATE: 14 BRPM | HEART RATE: 58 BPM | OXYGEN SATURATION: 100 % | HEIGHT: 62 IN | DIASTOLIC BLOOD PRESSURE: 64 MMHG | SYSTOLIC BLOOD PRESSURE: 129 MMHG | TEMPERATURE: 98 F

## 2018-07-31 DIAGNOSIS — C15.4 MALIGNANT NEOPLASM OF MIDDLE THIRD OF ESOPHAGUS: ICD-10-CM

## 2018-07-31 DIAGNOSIS — C32.0 MALIGNANT NEOPLASM OF GLOTTIS: ICD-10-CM

## 2018-07-31 DIAGNOSIS — Z85.89 PERSONAL HISTORY OF MALIGNANT NEOPLASM OF OTHER ORGANS AND SYSTEMS: Chronic | ICD-10-CM

## 2018-07-31 PROCEDURE — C1769: CPT

## 2018-07-31 PROCEDURE — 76937 US GUIDE VASCULAR ACCESS: CPT | Mod: 26

## 2018-07-31 PROCEDURE — 36561 INSERT TUNNELED CV CATH: CPT

## 2018-07-31 PROCEDURE — T1013: CPT

## 2018-07-31 PROCEDURE — 77001 FLUOROGUIDE FOR VEIN DEVICE: CPT | Mod: 26

## 2018-07-31 PROCEDURE — C1788: CPT

## 2018-07-31 PROCEDURE — 36561 INSERT TUNNELED CV CATH: CPT | Mod: RT

## 2018-07-31 PROCEDURE — 76942 ECHO GUIDE FOR BIOPSY: CPT

## 2018-07-31 PROCEDURE — 77001 FLUOROGUIDE FOR VEIN DEVICE: CPT

## 2018-07-31 RX ORDER — FERROUS SULFATE 325(65) MG
1 TABLET ORAL
Qty: 0 | Refills: 0 | COMMUNITY

## 2018-07-31 NOTE — BRIEF OPERATIVE NOTE - PROCEDURE
<<-----Click on this checkbox to enter Procedure Insertion of catheter with port  07/31/2018    Active  HALPER

## 2018-07-31 NOTE — ASU DISCHARGE PLAN (ADULT/PEDIATRIC). - MEDICATION SUMMARY - MEDICATIONS TO TAKE
I will START or STAY ON the medications listed below when I get home from the hospital:    Aspirin Enteric Coated 81 mg oral delayed release tablet  -- 1 tab(s) by mouth once a day  -- Indication: For per PMD    Zocor 10 mg oral tablet  -- 1 tab(s) by mouth once a day (at bedtime)  -- Indication: For per PMD    Norvasc 5 mg oral tablet  -- 1 tab(s) by mouth once a day  -- Indication: For per PMD    ferrous sulfate 325 mg (65 mg elemental iron) oral tablet  -- 1 tab(s) by mouth 2 times a day  -- Indication: For per PMD    pantoprazole 40 mg oral delayed release tablet  -- 1 tab(s) by mouth 2 times a day   -- Indication: For per PMD    levothyroxine 25 mcg (0.025 mg) oral tablet  -- 1 tab(s) by mouth once a day  -- Indication: For per PMD

## 2018-08-06 VITALS
WEIGHT: 103 LBS | BODY MASS INDEX: 18.95 KG/M2 | OXYGEN SATURATION: 95 % | SYSTOLIC BLOOD PRESSURE: 138 MMHG | HEART RATE: 48 BPM | DIASTOLIC BLOOD PRESSURE: 64 MMHG | TEMPERATURE: 98.1 F | RESPIRATION RATE: 16 BRPM | HEIGHT: 62 IN

## 2018-08-06 PROCEDURE — 77387B: CUSTOM | Mod: 26

## 2018-08-07 PROCEDURE — 77387B: CUSTOM | Mod: 26

## 2018-08-08 PROCEDURE — 77387B: CUSTOM | Mod: 26

## 2018-08-09 PROCEDURE — 77387B: CUSTOM | Mod: 26

## 2018-08-10 PROCEDURE — 77014: CPT | Mod: 26

## 2018-08-10 PROCEDURE — 77427 RADIATION TX MANAGEMENT X5: CPT

## 2018-08-13 VITALS
BODY MASS INDEX: 19.51 KG/M2 | HEIGHT: 62 IN | SYSTOLIC BLOOD PRESSURE: 113 MMHG | RESPIRATION RATE: 16 BRPM | HEART RATE: 55 BPM | OXYGEN SATURATION: 95 % | DIASTOLIC BLOOD PRESSURE: 64 MMHG | WEIGHT: 106 LBS | TEMPERATURE: 98 F

## 2018-08-13 PROCEDURE — 77387B: CUSTOM | Mod: 26

## 2018-08-14 PROCEDURE — 77387B: CUSTOM | Mod: 26

## 2018-08-15 PROCEDURE — 77387B: CUSTOM | Mod: 26

## 2018-08-16 PROCEDURE — 77387B: CUSTOM | Mod: 26

## 2018-08-17 PROCEDURE — 77014: CPT | Mod: 26

## 2018-08-17 PROCEDURE — 77427 RADIATION TX MANAGEMENT X5: CPT

## 2018-08-20 VITALS
SYSTOLIC BLOOD PRESSURE: 104 MMHG | HEIGHT: 62 IN | OXYGEN SATURATION: 100 % | WEIGHT: 102 LBS | TEMPERATURE: 98 F | RESPIRATION RATE: 16 BRPM | DIASTOLIC BLOOD PRESSURE: 60 MMHG | HEART RATE: 70 BPM | BODY MASS INDEX: 18.77 KG/M2

## 2018-08-20 PROCEDURE — 77387B: CUSTOM | Mod: 26

## 2018-08-21 ENCOUNTER — INPATIENT (INPATIENT)
Facility: HOSPITAL | Age: 73
LOS: 4 days | Discharge: HOME | DRG: 846 | End: 2018-08-26
Attending: INTERNAL MEDICINE | Admitting: INTERNAL MEDICINE
Payer: MEDICARE

## 2018-08-21 VITALS
DIASTOLIC BLOOD PRESSURE: 60 MMHG | RESPIRATION RATE: 20 BRPM | OXYGEN SATURATION: 98 % | WEIGHT: 90.39 LBS | SYSTOLIC BLOOD PRESSURE: 95 MMHG | HEIGHT: 60 IN | HEART RATE: 65 BPM | TEMPERATURE: 98 F

## 2018-08-21 DIAGNOSIS — C15.5 MALIGNANT NEOPLASM OF LOWER THIRD OF ESOPHAGUS: ICD-10-CM

## 2018-08-21 DIAGNOSIS — Z85.89 PERSONAL HISTORY OF MALIGNANT NEOPLASM OF OTHER ORGANS AND SYSTEMS: Chronic | ICD-10-CM

## 2018-08-21 LAB
ALBUMIN SERPL ELPH-MCNC: 3 G/DL — LOW (ref 3.3–5.2)
ALP SERPL-CCNC: 53 U/L — SIGNIFICANT CHANGE UP (ref 40–120)
ALT FLD-CCNC: 6 U/L — SIGNIFICANT CHANGE UP
ANION GAP SERPL CALC-SCNC: 14 MMOL/L — SIGNIFICANT CHANGE UP (ref 5–17)
AST SERPL-CCNC: 13 U/L — SIGNIFICANT CHANGE UP
BASOPHILS # BLD AUTO: 0 K/UL — SIGNIFICANT CHANGE UP (ref 0–0.2)
BASOPHILS NFR BLD AUTO: 0.1 % — SIGNIFICANT CHANGE UP (ref 0–2)
BILIRUB SERPL-MCNC: 0.2 MG/DL — LOW (ref 0.4–2)
BUN SERPL-MCNC: 17 MG/DL — SIGNIFICANT CHANGE UP (ref 8–20)
CALCIUM SERPL-MCNC: 8.9 MG/DL — SIGNIFICANT CHANGE UP (ref 8.6–10.2)
CHLORIDE SERPL-SCNC: 99 MMOL/L — SIGNIFICANT CHANGE UP (ref 98–107)
CO2 SERPL-SCNC: 25 MMOL/L — SIGNIFICANT CHANGE UP (ref 22–29)
CREAT SERPL-MCNC: 0.78 MG/DL — SIGNIFICANT CHANGE UP (ref 0.5–1.3)
EOSINOPHIL # BLD AUTO: 0 K/UL — SIGNIFICANT CHANGE UP (ref 0–0.5)
EOSINOPHIL NFR BLD AUTO: 0.5 % — SIGNIFICANT CHANGE UP (ref 0–5)
GLUCOSE SERPL-MCNC: 137 MG/DL — HIGH (ref 70–115)
HCT VFR BLD CALC: 28.3 % — LOW (ref 42–52)
HGB BLD-MCNC: 9.1 G/DL — LOW (ref 14–18)
INR BLD: 1.28 RATIO — HIGH (ref 0.88–1.16)
LYMPHOCYTES # BLD AUTO: 0.6 K/UL — LOW (ref 1–4.8)
LYMPHOCYTES # BLD AUTO: 7.5 % — LOW (ref 20–55)
MAGNESIUM SERPL-MCNC: 2.1 MG/DL — SIGNIFICANT CHANGE UP (ref 1.6–2.6)
MCHC RBC-ENTMCNC: 26.4 PG — LOW (ref 27–31)
MCHC RBC-ENTMCNC: 32.2 G/DL — SIGNIFICANT CHANGE UP (ref 32–36)
MCV RBC AUTO: 82 FL — SIGNIFICANT CHANGE UP (ref 80–94)
MONOCYTES # BLD AUTO: 0.3 K/UL — SIGNIFICANT CHANGE UP (ref 0–0.8)
MONOCYTES NFR BLD AUTO: 3.4 % — SIGNIFICANT CHANGE UP (ref 3–10)
NEUTROPHILS # BLD AUTO: 7.3 K/UL — SIGNIFICANT CHANGE UP (ref 1.8–8)
NEUTROPHILS NFR BLD AUTO: 88.1 % — HIGH (ref 37–73)
PLATELET # BLD AUTO: 331 K/UL — SIGNIFICANT CHANGE UP (ref 150–400)
POTASSIUM SERPL-MCNC: 4 MMOL/L — SIGNIFICANT CHANGE UP (ref 3.5–5.3)
POTASSIUM SERPL-SCNC: 4 MMOL/L — SIGNIFICANT CHANGE UP (ref 3.5–5.3)
PROT SERPL-MCNC: 6.6 G/DL — SIGNIFICANT CHANGE UP (ref 6.6–8.7)
PROTHROM AB SERPL-ACNC: 14.1 SEC — HIGH (ref 9.8–12.7)
RBC # BLD: 3.45 M/UL — LOW (ref 4.6–6.2)
RBC # FLD: 14.8 % — SIGNIFICANT CHANGE UP (ref 11–15.6)
SODIUM SERPL-SCNC: 138 MMOL/L — SIGNIFICANT CHANGE UP (ref 135–145)
WBC # BLD: 8.2 K/UL — SIGNIFICANT CHANGE UP (ref 4.8–10.8)
WBC # FLD AUTO: 8.2 K/UL — SIGNIFICANT CHANGE UP (ref 4.8–10.8)

## 2018-08-21 PROCEDURE — 99223 1ST HOSP IP/OBS HIGH 75: CPT

## 2018-08-21 RX ORDER — SODIUM CHLORIDE 9 MG/ML
1000 INJECTION, SOLUTION INTRAVENOUS
Qty: 0 | Refills: 0 | Status: DISCONTINUED | OUTPATIENT
Start: 2018-08-21 | End: 2018-08-21

## 2018-08-21 RX ORDER — FUROSEMIDE 40 MG
40 TABLET ORAL ONCE
Qty: 0 | Refills: 0 | Status: COMPLETED | OUTPATIENT
Start: 2018-08-21 | End: 2018-08-21

## 2018-08-21 RX ORDER — FLUOROURACIL 50 MG/ML
1400 INJECTION, SOLUTION INTRAVENOUS DAILY
Qty: 0 | Refills: 0 | Status: COMPLETED | OUTPATIENT
Start: 2018-08-21 | End: 2018-08-24

## 2018-08-21 RX ORDER — FERROUS SULFATE 325(65) MG
325 TABLET ORAL
Qty: 0 | Refills: 0 | Status: DISCONTINUED | OUTPATIENT
Start: 2018-08-21 | End: 2018-08-26

## 2018-08-21 RX ORDER — AMLODIPINE BESYLATE 2.5 MG/1
5 TABLET ORAL DAILY
Qty: 0 | Refills: 0 | Status: DISCONTINUED | OUTPATIENT
Start: 2018-08-21 | End: 2018-08-26

## 2018-08-21 RX ORDER — LEVOTHYROXINE SODIUM 125 MCG
25 TABLET ORAL DAILY
Qty: 0 | Refills: 0 | Status: DISCONTINUED | OUTPATIENT
Start: 2018-08-21 | End: 2018-08-26

## 2018-08-21 RX ORDER — DEXAMETHASONE 0.5 MG/5ML
20 ELIXIR ORAL ONCE
Qty: 0 | Refills: 0 | Status: COMPLETED | OUTPATIENT
Start: 2018-08-21 | End: 2018-08-21

## 2018-08-21 RX ORDER — SODIUM CHLORIDE 9 MG/ML
1000 INJECTION, SOLUTION INTRAVENOUS
Qty: 0 | Refills: 0 | Status: COMPLETED | OUTPATIENT
Start: 2018-08-21 | End: 2018-08-21

## 2018-08-21 RX ORDER — CISPLATIN 1 MG/ML
100 INJECTION, SOLUTION INTRAVENOUS ONCE
Qty: 0 | Refills: 0 | Status: COMPLETED | OUTPATIENT
Start: 2018-08-21 | End: 2018-08-21

## 2018-08-21 RX ORDER — ASPIRIN/CALCIUM CARB/MAGNESIUM 324 MG
81 TABLET ORAL DAILY
Qty: 0 | Refills: 0 | Status: DISCONTINUED | OUTPATIENT
Start: 2018-08-21 | End: 2018-08-26

## 2018-08-21 RX ORDER — GRANISETRON HYDROCHLORIDE 1 MG/1
1 TABLET, FILM COATED ORAL ONCE
Qty: 0 | Refills: 0 | Status: COMPLETED | OUTPATIENT
Start: 2018-08-21 | End: 2018-08-21

## 2018-08-21 RX ORDER — MANNITOL
25 POWDER (GRAM) MISCELLANEOUS ONCE
Qty: 0 | Refills: 0 | Status: COMPLETED | OUTPATIENT
Start: 2018-08-21 | End: 2018-08-21

## 2018-08-21 RX ORDER — PANTOPRAZOLE SODIUM 20 MG/1
40 TABLET, DELAYED RELEASE ORAL
Qty: 0 | Refills: 0 | Status: DISCONTINUED | OUTPATIENT
Start: 2018-08-21 | End: 2018-08-26

## 2018-08-21 RX ORDER — CISPLATIN 1 MG/ML
100 INJECTION, SOLUTION INTRAVENOUS ONCE
Qty: 0 | Refills: 0 | Status: DISCONTINUED | OUTPATIENT
Start: 2018-08-21 | End: 2018-08-21

## 2018-08-21 RX ORDER — SIMVASTATIN 20 MG/1
10 TABLET, FILM COATED ORAL AT BEDTIME
Qty: 0 | Refills: 0 | Status: DISCONTINUED | OUTPATIENT
Start: 2018-08-21 | End: 2018-08-26

## 2018-08-21 RX ADMIN — Medication 110 MILLIGRAM(S): at 14:56

## 2018-08-21 RX ADMIN — Medication 325 MILLIGRAM(S): at 16:23

## 2018-08-21 RX ADMIN — Medication 40 MILLIGRAM(S): at 16:08

## 2018-08-21 RX ADMIN — Medication 2 MILLIGRAM(S): at 16:08

## 2018-08-21 RX ADMIN — SODIUM CHLORIDE 333 MILLILITER(S): 9 INJECTION, SOLUTION INTRAVENOUS at 10:33

## 2018-08-21 RX ADMIN — CISPLATIN 200 MILLIGRAM(S): 1 INJECTION, SOLUTION INTRAVENOUS at 16:16

## 2018-08-21 RX ADMIN — Medication 100 GRAM(S): at 13:26

## 2018-08-21 RX ADMIN — GRANISETRON HYDROCHLORIDE 1 MILLIGRAM(S): 1 TABLET, FILM COATED ORAL at 16:08

## 2018-08-21 RX ADMIN — FLUOROURACIL 42.83 MILLIGRAM(S): 50 INJECTION, SOLUTION INTRAVENOUS at 19:23

## 2018-08-21 RX ADMIN — PANTOPRAZOLE SODIUM 40 MILLIGRAM(S): 20 TABLET, DELAYED RELEASE ORAL at 16:23

## 2018-08-21 RX ADMIN — SIMVASTATIN 10 MILLIGRAM(S): 20 TABLET, FILM COATED ORAL at 22:32

## 2018-08-21 NOTE — H&P ADULT - ASSESSMENT
Pt is a 72 yo male with pmh of esophageal cancer in 2007 sp chemo and radiation, htn, hypothyroidism, hypercholesterolemia who presented for chemotherapy due to esophageal cancer reoccurrence.     -Esophageal cancer:  Pt was directly admitted to the oncology floor for 5 days of Cisplatin and 5-FU.   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 Pt is sp dilatation and esophageal stent in June 2018.  Sp chemoport recently.   Hemo/Onc and Palliative consulted.     -Htn:  Continue with Norvasc.     -Hld:  Cw Zocor.     -Hypothyroidism:  Cw levothyroxine.     -Gerd:  Cw Protonix.     -Dvt PPx:  SCD

## 2018-08-21 NOTE — H&P ADULT - NSHPLABSRESULTS_GEN_ALL_CORE
9.1    8.2   )-----------( 331      ( 21 Aug 2018 11:46 )             28.3     08-21    138  |  99  |  17.0  ----------------------------<  137<H>  4.0   |  25.0  |  0.78    Ca    8.9      21 Aug 2018 10:29  Mg     2.1     08-21    TPro  6.6  /  Alb  3.0<L>  /  TBili  0.2<L>  /  DBili  x   /  AST  13  /  ALT  6   /  AlkPhos  53  08-21

## 2018-08-21 NOTE — H&P ADULT - NSHPPHYSICALEXAM_GEN_ALL_CORE
EXAM:  Vital Signs Last 24 Hrs  T(C): 36.5 (21 Aug 2018 09:36), Max: 36.5 (21 Aug 2018 09:36)  T(F): 97.7 (21 Aug 2018 09:36), Max: 97.7 (21 Aug 2018 09:36)  HR: 65 (21 Aug 2018 09:36) (65 - 65)  BP: 95/60 (21 Aug 2018 09:36) (95/60 - 95/60)  RR: 20 (21 Aug 2018 09:36) (20 - 20)    Constitutional: awake laying in bed. cachectic.   Eyes: eomi  ENMT: patent nares, moist mucus membranes. Uvula deviated to left with fullness.   Neck: supple  Back: non tender. no scoliosis.   Respiratory: bilaterally clear to auscultation, no wheezing, no rhonchi, no crackles, no decreased air entry  Cardiovascular: s1s2, rrr, no murmurs. Right sided chest chemoport.   Gastrointestinal: soft, non tender, +bowel sounds, no rebound, no guarding.   Extremities: no edema.   Neurological: alert and oriented to person, date and place.   Skin: intact no rash, warm to touch.   Musculoskeletal: moves all 4 extremities  Psychiatric:  appropriate affect.

## 2018-08-21 NOTE — H&P ADULT - HISTORY OF PRESENT ILLNESS
Pt is a 72 yo male with pmh of esophageal cancer in 2007 sp chemo and radiation, htn, hypothyroidism, hypercholesterolemia who presented for chemotherapy reoccurrence Pt is being following by Dr Delacruz and recently found to have 8cm middle third of esophagus squamous cell cancer after having complaint of dysphagia, weight loss. Pt is sp dilatation and esophageal stent in June 2018. Also is sp chemoport recently.     Pt was directly admitted to the oncology floor for 5 days of Cisplatin and 5-FU.

## 2018-08-21 NOTE — H&P ADULT - NSHPREVIEWOFSYSTEMS_GEN_ALL_CORE
General: no lethargy	  Skin/Breast: no rash  Ophthalmologic: no blurry vision  ENMT:	no sore throat, no ear pain, no dysphagia, no regurgitation.   Respiratory and Thorax: no sob, no cough, wheezing  Cardiovascular: no chest pain, no palpitations, no lower extremity swelling  Gastrointestinal: no nausea, no vomiting,   Genitourinary: no dysuria, no frequency	  Musculoskeletal: no muscle pain  Neurological: no weakness  Psychiatric: no anxiety	  Hematology/Lymphatics: no bruising  Endocrine: no increased thirst, no change in appetite

## 2018-08-22 ENCOUNTER — OUTPATIENT (OUTPATIENT)
Dept: OUTPATIENT SERVICES | Facility: HOSPITAL | Age: 73
LOS: 1 days | Discharge: HOME | End: 2018-08-22
Payer: MEDICARE

## 2018-08-22 DIAGNOSIS — Z85.89 PERSONAL HISTORY OF MALIGNANT NEOPLASM OF OTHER ORGANS AND SYSTEMS: Chronic | ICD-10-CM

## 2018-08-22 LAB
ALBUMIN SERPL ELPH-MCNC: 3.1 G/DL — LOW (ref 3.3–5.2)
ALP SERPL-CCNC: 60 U/L — SIGNIFICANT CHANGE UP (ref 40–120)
ALT FLD-CCNC: 6 U/L — SIGNIFICANT CHANGE UP
ANION GAP SERPL CALC-SCNC: 11 MMOL/L — SIGNIFICANT CHANGE UP (ref 5–17)
ANISOCYTOSIS BLD QL: SLIGHT — SIGNIFICANT CHANGE UP
AST SERPL-CCNC: 11 U/L — SIGNIFICANT CHANGE UP
BILIRUB SERPL-MCNC: 0.2 MG/DL — LOW (ref 0.4–2)
BUN SERPL-MCNC: 20 MG/DL — SIGNIFICANT CHANGE UP (ref 8–20)
CALCIUM SERPL-MCNC: 8.9 MG/DL — SIGNIFICANT CHANGE UP (ref 8.6–10.2)
CHLORIDE SERPL-SCNC: 99 MMOL/L — SIGNIFICANT CHANGE UP (ref 98–107)
CO2 SERPL-SCNC: 29 MMOL/L — SIGNIFICANT CHANGE UP (ref 22–29)
CREAT SERPL-MCNC: 0.68 MG/DL — SIGNIFICANT CHANGE UP (ref 0.5–1.3)
GLUCOSE SERPL-MCNC: 144 MG/DL — HIGH (ref 70–115)
HCT VFR BLD CALC: 30.1 % — LOW (ref 42–52)
HGB BLD-MCNC: 9.8 G/DL — LOW (ref 14–18)
HYPOCHROMIA BLD QL: SLIGHT — SIGNIFICANT CHANGE UP
LYMPHOCYTES # BLD AUTO: 2 % — LOW (ref 20–55)
MACROCYTES BLD QL: SLIGHT — SIGNIFICANT CHANGE UP
MCHC RBC-ENTMCNC: 26.8 PG — LOW (ref 27–31)
MCHC RBC-ENTMCNC: 32.6 G/DL — SIGNIFICANT CHANGE UP (ref 32–36)
MCV RBC AUTO: 82.2 FL — SIGNIFICANT CHANGE UP (ref 80–94)
MONOCYTES NFR BLD AUTO: 2 % — LOW (ref 3–10)
NEUTROPHILS NFR BLD AUTO: 96 % — HIGH (ref 37–73)
PLAT MORPH BLD: NORMAL — SIGNIFICANT CHANGE UP
PLATELET # BLD AUTO: 378 K/UL — SIGNIFICANT CHANGE UP (ref 150–400)
POIKILOCYTOSIS BLD QL AUTO: SLIGHT — SIGNIFICANT CHANGE UP
POLYCHROMASIA BLD QL SMEAR: SLIGHT — SIGNIFICANT CHANGE UP
POTASSIUM SERPL-MCNC: 4.4 MMOL/L — SIGNIFICANT CHANGE UP (ref 3.5–5.3)
POTASSIUM SERPL-SCNC: 4.4 MMOL/L — SIGNIFICANT CHANGE UP (ref 3.5–5.3)
PROT SERPL-MCNC: 7.1 G/DL — SIGNIFICANT CHANGE UP (ref 6.6–8.7)
RBC # BLD: 3.66 M/UL — LOW (ref 4.6–6.2)
RBC # FLD: 14.8 % — SIGNIFICANT CHANGE UP (ref 11–15.6)
RBC BLD AUTO: ABNORMAL
SODIUM SERPL-SCNC: 139 MMOL/L — SIGNIFICANT CHANGE UP (ref 135–145)
WBC # BLD: 9.4 K/UL — SIGNIFICANT CHANGE UP (ref 4.8–10.8)
WBC # FLD AUTO: 9.4 K/UL — SIGNIFICANT CHANGE UP (ref 4.8–10.8)

## 2018-08-22 PROCEDURE — 99233 SBSQ HOSP IP/OBS HIGH 50: CPT

## 2018-08-22 RX ORDER — ONDANSETRON 8 MG/1
4 TABLET, FILM COATED ORAL EVERY 4 HOURS
Qty: 0 | Refills: 0 | Status: DISCONTINUED | OUTPATIENT
Start: 2018-08-22 | End: 2018-08-26

## 2018-08-22 RX ADMIN — Medication 325 MILLIGRAM(S): at 17:09

## 2018-08-22 RX ADMIN — FLUOROURACIL 42.83 MILLIGRAM(S): 50 INJECTION, SOLUTION INTRAVENOUS at 18:11

## 2018-08-22 RX ADMIN — ONDANSETRON 4 MILLIGRAM(S): 8 TABLET, FILM COATED ORAL at 14:30

## 2018-08-22 RX ADMIN — PANTOPRAZOLE SODIUM 40 MILLIGRAM(S): 20 TABLET, DELAYED RELEASE ORAL at 05:26

## 2018-08-22 RX ADMIN — AMLODIPINE BESYLATE 5 MILLIGRAM(S): 2.5 TABLET ORAL at 05:26

## 2018-08-22 RX ADMIN — Medication 25 MICROGRAM(S): at 05:26

## 2018-08-22 RX ADMIN — SIMVASTATIN 10 MILLIGRAM(S): 20 TABLET, FILM COATED ORAL at 21:04

## 2018-08-22 RX ADMIN — PANTOPRAZOLE SODIUM 40 MILLIGRAM(S): 20 TABLET, DELAYED RELEASE ORAL at 17:09

## 2018-08-22 RX ADMIN — Medication 81 MILLIGRAM(S): at 11:39

## 2018-08-22 RX ADMIN — Medication 325 MILLIGRAM(S): at 05:26

## 2018-08-22 NOTE — PROGRESS NOTE ADULT - ASSESSMENT
Pt is a 74 yo male with pmh of esophageal cancer in 2007 sp chemo and radiation, htn, hypothyroidism, hypercholesterolemia who presented for chemotherapy due to esophageal cancer reoccurrence.     -Esophageal cancer:  Pt was directly admitted to the oncology floor for 5 days of Cisplatin and 5-FU.   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 Pt is sp dilatation and esophageal stent in June 2018.  Sp chemoport recently.   Hemo/Onc and Palliative consulted.   Discussed with Dr Flores, radiation oncologist. Will like orders to hold continuous chemo infusion for patient  trip to [facility] for radiation therapy.     -Htn:  Continue with Norvasc.     -Hld:  Cw Zocor.     -Hypothyroidism:  Cw levothyroxine.     -Gerd:  Cw Protonix.     Supportive care.

## 2018-08-22 NOTE — CONSULT NOTE ADULT - SUBJECTIVE AND OBJECTIVE BOX
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 Pt is sp dilatation and esophageal stent in June 2018. Also is sp chemoport recently.     Pt was directly admitted to the oncology floor for 5 days of Cisplatin and 5-FU. (21 Aug 2018 14:38)    Orders written and discussed with pharmacy.     Dr. Flores to continue RT at the [facility].    PAST MEDICAL & SURGICAL HISTORY:  High cholesterol  Hypothyroid  HTN (hypertension)  Head and neck cancer: 2007  History of head and neck cancer    FAMILY HISTORY:  Family history of stomach cancer (Father, Sibling)    REVIEW OF SYSTEMS  Dysphagia and weight loss., but is able to eat.  No N/V/D  No bleeding  No pain.  General:	      PHYSICAL EXAM:  Alert and oriented  Mild pallor  No icterus  No cervical LNs  Lungs: Clear  Heart: RR  Abd: Non-tender  No HSM  Exts: No CCE, no evidence of the DVT on the bedside evaluation.      CBC Full  -  ( 22 Aug 2018 07:25 )  WBC Count : 9.4 K/uL  Hemoglobin : 9.8 g/dL  Hematocrit : 30.1 %  Platelet Count - Automated : 378 K/uL  Mean Cell Volume : 82.2 fl  Mean Cell Hemoglobin : 26.8 pg  Mean Cell Hemoglobin Concentration : 32.6 g/dL  Auto Neutrophil # : x  Auto Lymphocyte # : x  Auto Monocyte # : x  Auto Eosinophil # : x  Auto Basophil # : x  Auto Neutrophil % : 96.0 %  Auto Lymphocyte % : 2.0 %  Auto Monocyte % : 2.0 %  Auto Eosinophil % : x  Auto Basophil % : x    PT/INR - ( 21 Aug 2018 12:44 )   PT: 14.1 sec;   INR: 1.28 ratio           22 Aug 2018 07:25    139    |  99     |  20.0   ----------------------------<  144    4.4     |  29.0   |  0.68     Ca    8.9        22 Aug 2018 07:25  Mg     2.1       21 Aug 2018 10:29    TPro  7.1    /  Alb  3.1    /  TBili  0.2    /  DBili  x      /  AST  11     /  ALT  6      /  AlkPhos  60     22 Aug 2018 07:25      Squamous cell carcinoma esophagus  Getting RT  To get Cisplatin x 1 and 4 days of CI 5FU.  IV hydration.  Anti-emetics.    Thank you.

## 2018-08-22 NOTE — PROGRESS NOTE ADULT - SUBJECTIVE AND OBJECTIVE BOX
CC: Esophageal cancer. Admitted for chemo and radiation therapy .    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 Pt is sp dilatation and esophageal stent in June 2018. Also is sp chemoport recently.     Pt was directly admitted to the oncology floor for 5 days of Cisplatin and 5-FU. (21 Aug 2018 14:38)    REVIEW OF SYSTEMS:    Patient denied fever, chills, abdominal pain, nausea, vomiting, cough, shortness of breath, chest pain or palpitations    Vital Signs Last 24 Hrs  T(C): 36.8 (22 Aug 2018 08:07), Max: 36.8 (22 Aug 2018 08:07)  T(F): 98.2 (22 Aug 2018 08:07), Max: 98.2 (22 Aug 2018 08:07)  HR: 64 (22 Aug 2018 08:07) (55 - 64)  BP: 104/64 (22 Aug 2018 08:07) (100/50 - 108/50)  BP(mean): --  RR: 18 (22 Aug 2018 08:07) (18 - 20)  SpO2: 96% (22 Aug 2018 08:07) (96% - 100%)I&O's Summary    21 Aug 2018 07:01  -  22 Aug 2018 07:00  --------------------------------------------------------  IN: 470.8 mL / OUT: 0 mL / NET: 470.8 mL      PHYSICAL EXAM:  GENERAL: Cachexia BMP 17.7  HEENT: PERRL, +EOMI, anicteric, no [place]  NECK: Supple, No JVD   CHEST/LUNG: CTA bilaterally; Normal effort  HEART: S1S2 Normal intensity, no murmurs, gallops or rubs noted  ABDOMEN: Soft, BS Normoactive, NT, ND, no HSM noted  EXTREMITIES:  2+ radial and DP pulses noted, no clubbing, cyanosis, or edema noted, Limited mobility   SKIN: No rashes or lesions noted  NEURO: A&Ox3, no focal deficits noted, CN II-XII intact  PSYCH: normal mood and affect; insight/judgement appropriate  LABS:                        9.8    9.4   )-----------( 378      ( 22 Aug 2018 07:25 )             30.1     08-22    139  |  99  |  20.0  ----------------------------<  144<H>  4.4   |  29.0  |  0.68    Ca    8.9      22 Aug 2018 07:25  Mg     2.1     08-21    TPro  7.1  /  Alb  3.1<L>  /  TBili  0.2<L>  /  DBili  x   /  AST  11  /  ALT  6   /  AlkPhos  60  08-22    PT/INR - ( 21 Aug 2018 12:44 )   PT: 14.1 sec;   INR: 1.28 ratio             RADIOLOGY & ADDITIONAL TESTS:    MEDICATIONS:  MEDICATIONS  (STANDING):  amLODIPine   Tablet 5 milliGRAM(s) Oral daily  aspirin enteric coated 81 milliGRAM(s) Oral daily  ferrous    sulfate 325 milliGRAM(s) Oral two times a day  fluorouracil IVPB (eMAR) 1400 milliGRAM(s) IV Intermittent daily  levothyroxine 25 MICROGram(s) Oral daily  pantoprazole    Tablet 40 milliGRAM(s) Oral two times a day  simvastatin 10 milliGRAM(s) Oral at bedtime    MEDICATIONS  (PRN):  ondansetron Injectable 4 milliGRAM(s) IV Push every 4 hours PRN Nausea

## 2018-08-23 VITALS
OXYGEN SATURATION: 97 % | HEIGHT: 62 IN | SYSTOLIC BLOOD PRESSURE: 144 MMHG | DIASTOLIC BLOOD PRESSURE: 79 MMHG | WEIGHT: 102 LBS | BODY MASS INDEX: 18.77 KG/M2 | RESPIRATION RATE: 18 BRPM | HEART RATE: 62 BPM

## 2018-08-23 DIAGNOSIS — R13.10 DYSPHAGIA, UNSPECIFIED: ICD-10-CM

## 2018-08-23 DIAGNOSIS — Z51.5 ENCOUNTER FOR PALLIATIVE CARE: ICD-10-CM

## 2018-08-23 DIAGNOSIS — C15.4 MALIGNANT NEOPLASM OF MIDDLE THIRD OF ESOPHAGUS: ICD-10-CM

## 2018-08-23 DIAGNOSIS — E46 UNSPECIFIED PROTEIN-CALORIE MALNUTRITION: ICD-10-CM

## 2018-08-23 DIAGNOSIS — Z51.11 ENCOUNTER FOR ANTINEOPLASTIC CHEMOTHERAPY: ICD-10-CM

## 2018-08-23 LAB
ANION GAP SERPL CALC-SCNC: 12 MMOL/L — SIGNIFICANT CHANGE UP (ref 5–17)
BUN SERPL-MCNC: 15 MG/DL — SIGNIFICANT CHANGE UP (ref 8–20)
CALCIUM SERPL-MCNC: 8.5 MG/DL — LOW (ref 8.6–10.2)
CHLORIDE SERPL-SCNC: 98 MMOL/L — SIGNIFICANT CHANGE UP (ref 98–107)
CO2 SERPL-SCNC: 24 MMOL/L — SIGNIFICANT CHANGE UP (ref 22–29)
CREAT SERPL-MCNC: 0.78 MG/DL — SIGNIFICANT CHANGE UP (ref 0.5–1.3)
GLUCOSE SERPL-MCNC: 89 MG/DL — SIGNIFICANT CHANGE UP (ref 70–115)
HCT VFR BLD CALC: 31.7 % — LOW (ref 42–52)
HGB BLD-MCNC: 10.1 G/DL — LOW (ref 14–18)
MCHC RBC-ENTMCNC: 26.6 PG — LOW (ref 27–31)
MCHC RBC-ENTMCNC: 31.9 G/DL — LOW (ref 32–36)
MCV RBC AUTO: 83.4 FL — SIGNIFICANT CHANGE UP (ref 80–94)
PLATELET # BLD AUTO: 330 K/UL — SIGNIFICANT CHANGE UP (ref 150–400)
POTASSIUM SERPL-MCNC: 5 MMOL/L — SIGNIFICANT CHANGE UP (ref 3.5–5.3)
POTASSIUM SERPL-SCNC: 5 MMOL/L — SIGNIFICANT CHANGE UP (ref 3.5–5.3)
RBC # BLD: 3.8 M/UL — LOW (ref 4.6–6.2)
RBC # FLD: 15.1 % — SIGNIFICANT CHANGE UP (ref 11–15.6)
SODIUM SERPL-SCNC: 134 MMOL/L — LOW (ref 135–145)
WBC # BLD: 12 K/UL — HIGH (ref 4.8–10.8)
WBC # FLD AUTO: 12 K/UL — HIGH (ref 4.8–10.8)

## 2018-08-23 PROCEDURE — 99221 1ST HOSP IP/OBS SF/LOW 40: CPT

## 2018-08-23 PROCEDURE — 77387B: CUSTOM | Mod: 26

## 2018-08-23 PROCEDURE — 99232 SBSQ HOSP IP/OBS MODERATE 35: CPT | Mod: GC

## 2018-08-23 RX ORDER — SODIUM CHLORIDE 9 MG/ML
1000 INJECTION, SOLUTION INTRAVENOUS
Qty: 0 | Refills: 0 | Status: DISCONTINUED | OUTPATIENT
Start: 2018-08-23 | End: 2018-08-24

## 2018-08-23 RX ADMIN — FLUOROURACIL 42.83 MILLIGRAM(S): 50 INJECTION, SOLUTION INTRAVENOUS at 18:30

## 2018-08-23 RX ADMIN — PANTOPRAZOLE SODIUM 40 MILLIGRAM(S): 20 TABLET, DELAYED RELEASE ORAL at 06:05

## 2018-08-23 RX ADMIN — Medication 25 MICROGRAM(S): at 06:05

## 2018-08-23 RX ADMIN — PANTOPRAZOLE SODIUM 40 MILLIGRAM(S): 20 TABLET, DELAYED RELEASE ORAL at 18:57

## 2018-08-23 RX ADMIN — Medication 81 MILLIGRAM(S): at 13:20

## 2018-08-23 RX ADMIN — Medication 325 MILLIGRAM(S): at 18:57

## 2018-08-23 RX ADMIN — Medication 325 MILLIGRAM(S): at 06:04

## 2018-08-23 RX ADMIN — ONDANSETRON 4 MILLIGRAM(S): 8 TABLET, FILM COATED ORAL at 13:22

## 2018-08-23 RX ADMIN — SIMVASTATIN 10 MILLIGRAM(S): 20 TABLET, FILM COATED ORAL at 21:04

## 2018-08-23 RX ADMIN — AMLODIPINE BESYLATE 5 MILLIGRAM(S): 2.5 TABLET ORAL at 06:04

## 2018-08-23 NOTE — PROGRESS NOTE ADULT - SUBJECTIVE AND OBJECTIVE BOX
THO TURNER    [id number]    73y      Male    INTERVAL HPI/OVERNIGHT EVENTS:  CC: Esophageal cancer. Admitted for chemo and radiation therapy .    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 Pt is sp dilatation and esophageal stent in June 2018. Also is sp chemoport recently.     Pt was directly admitted to the oncology floor for 5 days of Cisplatin and 5-FU. (21 Aug 2018 14:38)    Pt went to [facility] for RT and returns with no new complaints.        REVIEW OF SYSTEMS:    CONSTITUTIONAL: No fever, +weight loss, + fatigue  RESPIRATORY: No cough, wheezing, hemoptysis; No shortness of breath  CARDIOVASCULAR: No chest pain, palpitations  GASTROINTESTINAL: No abdominal or epigastric pain. No nausea, vomiting  NEUROLOGICAL: No headaches, memory loss, loss of strength.      Vital Signs Last 24 Hrs  T(C): 36.9 (23 Aug 2018 07:52), Max: 36.9 (23 Aug 2018 07:52)  T(F): 98.4 (23 Aug 2018 07:52), Max: 98.4 (23 Aug 2018 07:52)  HR: 64 (23 Aug 2018 11:55) (54 - 64)  BP: 130/74 (23 Aug 2018 11:55) (110/58 - 130/74)  BP(mean): --  RR: 18 (23 Aug 2018 11:55) (17 - 18)  SpO2: 95% (23 Aug 2018 11:55) (95% - 100%)    PHYSICAL EXAM:    GENERAL: NAD, thin, frail, cachexia  HEENT: PERRL, +EOMI  NECK: soft, Supple, No JVD,   CHEST/LUNG: Clear to ascultation bilaterally; No wheezing  HEART: S1S2+, Regular rate and rhythm; No murmurs, rubs, or gallops  ABDOMEN: Soft, Nontender, Nondistended; Bowel sounds present  EXTREMITIES:  2+ Peripheral Pulses, No clubbing, cyanosis, or edema  SKIN: No rashes or lesions  NEURO: AAOX3, no focal deficits, no motor r sensory loss  PSYCH: normal mood    LABS:                        10.1   12.0  )-----------( 330      ( 23 Aug 2018 07:55 )             31.7     08-23    134<L>  |  98  |  15.0  ----------------------------<  89  5.0   |  24.0  |  0.78    Ca    8.5<L>      23 Aug 2018 07:55    TPro  7.1  /  Alb  3.1<L>  /  TBili  0.2<L>  /  DBili  x   /  AST  11  /  ALT  6   /  AlkPhos  60  08-22    PT/INR - ( 21 Aug 2018 12:44 )   PT: 14.1 sec;   INR: 1.28 ratio                 MEDICATIONS  (STANDING):  amLODIPine   Tablet 5 milliGRAM(s) Oral daily  aspirin enteric coated 81 milliGRAM(s) Oral daily  dextrose 5% + sodium chloride 0.45%. 1000 milliLiter(s) (75 mL/Hr) IV Continuous <Continuous>  ferrous    sulfate 325 milliGRAM(s) Oral two times a day  fluorouracil IVPB (eMAR) 1400 milliGRAM(s) IV Intermittent daily  levothyroxine 25 MICROGram(s) Oral daily  pantoprazole    Tablet 40 milliGRAM(s) Oral two times a day  simvastatin 10 milliGRAM(s) Oral at bedtime    MEDICATIONS  (PRN):  ondansetron Injectable 4 milliGRAM(s) IV Push every 4 hours PRN Nausea THO TURNER    [id number]    73y      Male    cc: esophageal cancer    INTERVAL HPI/OVERNIGHT EVENTS:  CC: Esophageal cancer. Admitted for chemo and radiation therapy .      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 Pt is sp dilatation and esophageal stent in June 2018. Also is sp chemoport recently.     Pt was directly admitted to the oncology floor for 5 days of Cisplatin and 5-FU. (21 Aug 2018 14:38)    Pt went to [facility] for RT and returns with no new complaints.        REVIEW OF SYSTEMS:    CONSTITUTIONAL: No fever, +weight loss, + fatigue  RESPIRATORY: No cough, wheezing, hemoptysis; No shortness of breath  CARDIOVASCULAR: No chest pain, palpitations  GASTROINTESTINAL: No abdominal or epigastric pain. No nausea, vomiting  NEUROLOGICAL: No headaches, memory loss, loss of strength.      Vital Signs Last 24 Hrs  T(C): 36.9 (23 Aug 2018 07:52), Max: 36.9 (23 Aug 2018 07:52)  T(F): 98.4 (23 Aug 2018 07:52), Max: 98.4 (23 Aug 2018 07:52)  HR: 64 (23 Aug 2018 11:55) (54 - 64)  BP: 130/74 (23 Aug 2018 11:55) (110/58 - 130/74)  BP(mean): --  RR: 18 (23 Aug 2018 11:55) (17 - 18)  SpO2: 95% (23 Aug 2018 11:55) (95% - 100%)    PHYSICAL EXAM:    GENERAL: NAD, thin, frail, cachexia  HEENT: PERRL, +EOMI  NECK: soft, Supple, No JVD,   CHEST/LUNG: Clear to ascultation bilaterally; No wheezing  HEART: S1S2+, Regular rate and rhythm; No murmurs, rubs, or gallops  ABDOMEN: Soft, Nontender, Nondistended; Bowel sounds present  EXTREMITIES:  2+ Peripheral Pulses, No clubbing, cyanosis, or edema  SKIN: No rashes or lesions  NEURO: AAOX3, no focal deficits, no motor r sensory loss  PSYCH: normal mood    LABS:                        10.1   12.0  )-----------( 330      ( 23 Aug 2018 07:55 )             31.7     08-23    134<L>  |  98  |  15.0  ----------------------------<  89  5.0   |  24.0  |  0.78    Ca    8.5<L>      23 Aug 2018 07:55    TPro  7.1  /  Alb  3.1<L>  /  TBili  0.2<L>  /  DBili  x   /  AST  11  /  ALT  6   /  AlkPhos  60  08-22    PT/INR - ( 21 Aug 2018 12:44 )   PT: 14.1 sec;   INR: 1.28 ratio                 MEDICATIONS  (STANDING):  amLODIPine   Tablet 5 milliGRAM(s) Oral daily  aspirin enteric coated 81 milliGRAM(s) Oral daily  dextrose 5% + sodium chloride 0.45%. 1000 milliLiter(s) (75 mL/Hr) IV Continuous <Continuous>  ferrous    sulfate 325 milliGRAM(s) Oral two times a day  fluorouracil IVPB (eMAR) 1400 milliGRAM(s) IV Intermittent daily  levothyroxine 25 MICROGram(s) Oral daily  pantoprazole    Tablet 40 milliGRAM(s) Oral two times a day  simvastatin 10 milliGRAM(s) Oral at bedtime    MEDICATIONS  (PRN):  ondansetron Injectable 4 milliGRAM(s) IV Push every 4 hours PRN Nausea

## 2018-08-23 NOTE — PROGRESS NOTE ADULT - ASSESSMENT
Pt is a 72 yo male with pmh of esophageal cancer in 2007 sp chemo and radiation, htn, hypothyroidism, hypercholesterolemia who presented for chemotherapy due to esophageal cancer reoccurrence.     -Esophageal cancer:  Pt was directly admitted to the oncology floor for 5 days of Cisplatin and 5-FU.   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 Pt is sp dilatation and esophageal stent in June 2018.  Sp chemoport recently.   Hemo/Onc and Palliative consulted.   [facility] for Radiation Treatment    -Htn:  Continue with Norvasc.     -Hld:  Cw Zocor.     -Hypothyroidism:  Cw levothyroxine.     -Gerd:  Cw Protonix.     Supportive care. Pt is a 74 yo male with pmh of esophageal cancer in 2007 sp chemo and radiation, htn, hypothyroidism, hypercholesterolemia who presented for chemotherapy due to esophageal cancer reoccurrence. Oncology on board. Palliative consulted for GOC. Will start IV hydration as he is on FU    -Esophageal cancer:  Pt was directly admitted to the oncology floor for 5 days of Cisplatin and 5-FU. 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 Pt is sp dilatation and esophageal stent in June 2018.  Sp chemoport recently.   Hemo/Onc and Palliative consulted.   [facility] for Radiation Treatment    -Htn:  Continue with Norvasc.     -Hld:  Cw Zocor.     -Hypothyroidism:  Cw levothyroxine.     -Gerd:  Cw Protonix.     Supportive care.

## 2018-08-23 NOTE — CONSULT NOTE ADULT - PROBLEM SELECTOR RECOMMENDATION 4
Less of a problem since he had the esophagus stented  Aspiration Precautions  Upright sitting position,  Check pharynx for emergence of painful mucositis

## 2018-08-23 NOTE — CONSULT NOTE ADULT - ATTENDING COMMENTS
COUNSELING:    Face to face meeting to discuss Advanced Care Planning - Time Spent ______ Minutes.  See goals of care note.    More than 50% time spent in counseling and coordinating care. ___35___ Minutes.     Thank you for the opportunity to assist with the care of this patient.   [facility] [phone].

## 2018-08-23 NOTE — CONSULT NOTE ADULT - PROBLEM SELECTOR RECOMMENDATION 5
Monitor for adverse effects-treat aggressively  Established HCP sister in [place].  WILL Speak with his sister tomorrow (once contact # is available) discuss HCP choice home care vs In-Pt hospice

## 2018-08-23 NOTE — PROGRESS NOTE ADULT - SUBJECTIVE AND OBJECTIVE BOX
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 Pt is sp dilatation and esophageal stent in June 2018. Also is sp chemo port recently.     Pt was directly admitted to the oncology floor for 5 days of Cisplatin and 5-FU. (21 Aug 2018 14:38)     Dr. Flores to continue RT at the [facility].    Tolerating chemotherapy very well.  No adverse effects.    PAST MEDICAL & SURGICAL HISTORY:  High cholesterol  Hypothyroid  HTN (hypertension)  Head and neck cancer: 2007  History of head and neck cancer    FAMILY HISTORY:  Family history of stomach cancer (Father, Sibling)    REVIEW OF SYSTEMS  Dysphagia and weight loss., but is able to eat.  No N/V/D  No bleeding  No pain.  General:	      PHYSICAL EXAM:  Alert and oriented  Mild pallor  No icterus  No cervical LNs  Lungs: Clear  Heart: RR  Abd: Non-tender  No HSM  Exts: No CCE, no evidence of the DVT on the bedside evaluation.    CBC Full  -  ( 23 Aug 2018 07:55 )  WBC Count : 12.0 K/uL  Hemoglobin : 10.1 g/dL  Hematocrit : 31.7 %  Platelet Count - Automated : 330 K/uL  Mean Cell Volume : 83.4 fl  Mean Cell Hemoglobin : 26.6 pg  Mean Cell Hemoglobin Concentration : 31.9 g/dL  Auto Neutrophil # : x  Auto Lymphocyte # : x  Auto Monocyte # : x  Auto Eosinophil # : x  Auto Basophil # : x  Auto Neutrophil % : x  Auto Lymphocyte % : x  Auto Monocyte % : x  Auto Eosinophil % : x  Auto Basophil % : x    08-23    134<L>  |  98  |  15.0  ----------------------------<  89  5.0   |  24.0  |  0.78    Ca    8.5<L>      23 Aug 2018 07:55    TPro  7.1  /  Alb  3.1<L>  /  TBili  0.2<L>  /  DBili  x   /  AST  11  /  ALT  6   /  AlkPhos  60  08-22        Squamous cell carcinoma esophagus  Getting RT  To get Cisplatin x 1 and 4 days of CI 5FU.  IV hydration.  Anti-emetics.

## 2018-08-23 NOTE — CONSULT NOTE ADULT - SUBJECTIVE AND OBJECTIVE BOX
HPI: This is a 74yo cachectic male PMH of Esophageal Cancer; S/P Chemo/RT, HTN, hypothyroid, HLD.  Presented post chemo port placement for Chemotherapy and concurrent RT treatment  for local recurrence of Esophageal Squamous Cell Ca. He has had recent unintentional weight loss. Today he is awake alert oriented x 3, receiving Fluorouracil IVPB day 3/5 at present time  He is thin, speech is difficult to understand from prior localized throat cancer. Since having a STENT placed in Esophagus prior admit, he has had less trouble swallowing. 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 Pt is sp dilatation and esophageal stent in June 2018. Also is sp chemoport recently.     CC: Pt was directly admitted to the oncology floor for 5 days of Cisplatin and 5-FU. (21 Aug 2018 14:38)      PERTINENT PMH REVIEWED: Yes     PAST MEDICAL & SURGICAL HISTORY:  High cholesterol  Hypothyroid  HTN (hypertension)  Head and neck cancer: 2007  History of head and neck cancer    SOCIAL HISTORY:        EtOH    No                                    Drugs   No                                   nonsmoker                                    Admitted from: Lives alone, friend Brice Bhatt @ [phone]  Sister Janny aLrsen lives in [place]    FAMILY HISTORY:  Family history of stomach cancer (Father, Sibling)    No Known Allergies  Intolerances    Baseline ADLs (prior to admission):  Independent      Present Symptoms:     Dyspnea: 0    Nausea/Vomiting:  No  Anxiety:   No  Depression:  No  Fatigue: Yes   Loss of appetite: Yes         Pain:    DENIES            Character-            Duration-            Effect-            Factors-            Frequency-            Location-            Severity-    Review of Systems: Reviewed                 All others negative    MEDICATIONS  (STANDING):  amLODIPine   Tablet 5 milliGRAM(s) Oral daily  aspirin enteric coated 81 milliGRAM(s) Oral daily  dextrose 5% + sodium chloride 0.45%. 1000 milliLiter(s) (75 mL/Hr) IV Continuous <Continuous>  ferrous    sulfate 325 milliGRAM(s) Oral two times a day  fluorouracil IVPB (eMAR) 1400 milliGRAM(s) IV Intermittent daily  levothyroxine 25 MICROGram(s) Oral daily  pantoprazole    Tablet 40 milliGRAM(s) Oral two times a day  simvastatin 10 milliGRAM(s) Oral at bedtime    MEDICATIONS  (PRN):  ondansetron Injectable 4 milliGRAM(s) IV Push every 4 hours PRN Nausea    PHYSICAL EXAM:    Vital Signs Last 24 Hrs  T(C): 37 (23 Aug 2018 16:34), Max: 37 (23 Aug 2018 16:34)  T(F): 98.6 (23 Aug 2018 16:34), Max: 98.6 (23 Aug 2018 16:34)  HR: 57 (23 Aug 2018 16:34) (54 - 64)  BP: 132/75 (23 Aug 2018 16:34) (120/66 - 132/75)  BP(mean): --  RR: 18 (23 Aug 2018 16:34) (17 - 18)  SpO2: 94% (23 Aug 2018 16:34) (94% - 100%)    General: alert  oriented x _3___                   cachexia      HEENT: normal  dry mouth      Lungs: comfortable no cough or wheezes     CV: normal     GI: normal      : normal      MSK:  weakness            ambulating before admission    Skin: normal  _  no rash    LABS:                        10.1   12.0  )-----------( 330      ( 23 Aug 2018 07:55 )             31.7     08-23    134<L>  |  98  |  15.0  ----------------------------<  89  5.0   |  24.0  |  0.78    Ca    8.5<L>      23 Aug 2018 07:55    TPro  7.1  /  Alb  3.1<L>  /  TBili  0.2<L>  /  DBili  x   /  AST  11  /  ALT  6   /  AlkPhos  60  08-22    I&O's Summary    22 Aug 2018 07:01  -  23 Aug 2018 07:00  --------------------------------------------------------  IN: 941.6 mL / OUT: 0 mL / NET: 941.6 mL    RADIOLOGY & ADDITIONAL STUDIES:    ADVANCE DIRECTIVES:   DNR  NO  Completed on:           Health Care Proxy established- Need Sisters contact information          [facility]   NO   Completed on:  Living Will   NO   Completed on:

## 2018-08-23 NOTE — CONSULT NOTE ADULT - CONSULT REASON
72 yo M PMH Esophageal CA-now with disease reoccurance  Frail, cachectic-admitted for 5 chemotherapy treatments

## 2018-08-23 NOTE — CONSULT NOTE ADULT - PROBLEM SELECTOR RECOMMENDATION 9
Cisplatin, Fluourouricil x 5 day treatment planned.  Concurrent Radiation Therapy  Assess for complications

## 2018-08-24 LAB
ANION GAP SERPL CALC-SCNC: 11 MMOL/L — SIGNIFICANT CHANGE UP (ref 5–17)
BUN SERPL-MCNC: 14 MG/DL — SIGNIFICANT CHANGE UP (ref 8–20)
CALCIUM SERPL-MCNC: 8.6 MG/DL — SIGNIFICANT CHANGE UP (ref 8.6–10.2)
CHLORIDE SERPL-SCNC: 95 MMOL/L — LOW (ref 98–107)
CO2 SERPL-SCNC: 27 MMOL/L — SIGNIFICANT CHANGE UP (ref 22–29)
CREAT SERPL-MCNC: 0.75 MG/DL — SIGNIFICANT CHANGE UP (ref 0.5–1.3)
GLUCOSE SERPL-MCNC: 118 MG/DL — HIGH (ref 70–115)
HCT VFR BLD CALC: 32.3 % — LOW (ref 42–52)
HGB BLD-MCNC: 10.2 G/DL — LOW (ref 14–18)
MCHC RBC-ENTMCNC: 26 PG — LOW (ref 27–31)
MCHC RBC-ENTMCNC: 31.6 G/DL — LOW (ref 32–36)
MCV RBC AUTO: 82.2 FL — SIGNIFICANT CHANGE UP (ref 80–94)
PLATELET # BLD AUTO: 406 K/UL — HIGH (ref 150–400)
POTASSIUM SERPL-MCNC: 4.8 MMOL/L — SIGNIFICANT CHANGE UP (ref 3.5–5.3)
POTASSIUM SERPL-SCNC: 4.8 MMOL/L — SIGNIFICANT CHANGE UP (ref 3.5–5.3)
RBC # BLD: 3.93 M/UL — LOW (ref 4.6–6.2)
RBC # FLD: 14.9 % — SIGNIFICANT CHANGE UP (ref 11–15.6)
SODIUM SERPL-SCNC: 133 MMOL/L — LOW (ref 135–145)
WBC # BLD: 10.6 K/UL — SIGNIFICANT CHANGE UP (ref 4.8–10.8)
WBC # FLD AUTO: 10.6 K/UL — SIGNIFICANT CHANGE UP (ref 4.8–10.8)

## 2018-08-24 PROCEDURE — 99232 SBSQ HOSP IP/OBS MODERATE 35: CPT | Mod: GC

## 2018-08-24 PROCEDURE — 77014: CPT | Mod: 26

## 2018-08-24 RX ORDER — SODIUM CHLORIDE 9 MG/ML
1000 INJECTION, SOLUTION INTRAVENOUS
Qty: 0 | Refills: 0 | Status: DISCONTINUED | OUTPATIENT
Start: 2018-08-24 | End: 2018-08-26

## 2018-08-24 RX ORDER — ENOXAPARIN SODIUM 100 MG/ML
30 INJECTION SUBCUTANEOUS
Qty: 0 | Refills: 0 | Status: DISCONTINUED | OUTPATIENT
Start: 2018-08-24 | End: 2018-08-26

## 2018-08-24 RX ADMIN — Medication 81 MILLIGRAM(S): at 18:16

## 2018-08-24 RX ADMIN — PANTOPRAZOLE SODIUM 40 MILLIGRAM(S): 20 TABLET, DELAYED RELEASE ORAL at 18:20

## 2018-08-24 RX ADMIN — AMLODIPINE BESYLATE 5 MILLIGRAM(S): 2.5 TABLET ORAL at 05:24

## 2018-08-24 RX ADMIN — Medication 325 MILLIGRAM(S): at 05:24

## 2018-08-24 RX ADMIN — Medication 25 MICROGRAM(S): at 05:24

## 2018-08-24 RX ADMIN — FLUOROURACIL 42.83 MILLIGRAM(S): 50 INJECTION, SOLUTION INTRAVENOUS at 21:32

## 2018-08-24 RX ADMIN — ENOXAPARIN SODIUM 30 MILLIGRAM(S): 100 INJECTION SUBCUTANEOUS at 18:21

## 2018-08-24 RX ADMIN — Medication 325 MILLIGRAM(S): at 18:16

## 2018-08-24 RX ADMIN — SODIUM CHLORIDE 75 MILLILITER(S): 9 INJECTION, SOLUTION INTRAVENOUS at 05:41

## 2018-08-24 RX ADMIN — PANTOPRAZOLE SODIUM 40 MILLIGRAM(S): 20 TABLET, DELAYED RELEASE ORAL at 05:24

## 2018-08-24 NOTE — PROGRESS NOTE ADULT - SUBJECTIVE AND OBJECTIVE BOX
THO TURNER    [id number]    73y      Male    cc: esophageal cancer    INTERVAL HPI/OVERNIGHT EVENTS:  CC: Esophageal cancer. Admitted for chemo and radiation therapy .      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 Pt is sp dilatation and esophageal stent in June 2018. Also is sp chemoport recently.     Pt was directly admitted to the oncology floor for 5 days of Cisplatin and 5-FU. Today is day 4. Pt with no new complaints, not eating much from what it appears. ROS neg. VSS    Vital Signs Last 24 Hrs  T(C): 37.1 (24 Aug 2018 10:10), Max: 37.2 (24 Aug 2018 04:52)  T(F): 98.7 (24 Aug 2018 10:10), Max: 98.9 (24 Aug 2018 04:52)  HR: 72 (24 Aug 2018 10:10) (56 - 72)  BP: 128/76 (24 Aug 2018 10:10) (116/60 - 132/75)  BP(mean): --  RR: 18 (24 Aug 2018 10:10) (18 - 18)  SpO2: 97% (24 Aug 2018 10:10) (94% - 99%)      PHYSICAL EXAM:  GENERAL: NAD, thin, frail, cachexia  HEENT: PERRL, +EOMI  NECK: soft, Supple, No JVD,   CHEST/LUNG: Clear to ascultation bilaterally; No wheezing  HEART: S1S2+, Regular rate and rhythm; No murmurs, rubs, or gallops  ABDOMEN: Soft, Nontender, Nondistended; Bowel sounds present  EXTREMITIES:  2+ Peripheral Pulses, No clubbing, cyanosis, or edema  SKIN: No rashes or lesions  NEURO: AAOX3, no focal deficits, no motor r sensory loss      LABS:                        10.1   12.0  )-----------( 330      ( 23 Aug 2018 07:55 )             31.7     08-23    134<L>  |  98  |  15.0  ----------------------------<  89  5.0   |  24.0  |  0.78    Ca    8.5<L>      23 Aug 2018 07:55    TPro  7.1  /  Alb  3.1<L>  /  TBili  0.2<L>  /  DBili  x   /  AST  11  /  ALT  6   /  AlkPhos  60  08-22    PT/INR - ( 21 Aug 2018 12:44 )   PT: 14.1 sec;   INR: 1.28 ratio          MEDICATIONS  (STANDING):  amLODIPine   Tablet 5 milliGRAM(s) Oral daily  aspirin enteric coated 81 milliGRAM(s) Oral daily  dextrose 5% + sodium chloride 0.45%. 1000 milliLiter(s) (75 mL/Hr) IV Continuous <Continuous>  ferrous    sulfate 325 milliGRAM(s) Oral two times a day  fluorouracil IVPB (eMAR) 1400 milliGRAM(s) IV Intermittent daily  levothyroxine 25 MICROGram(s) Oral daily  pantoprazole    Tablet 40 milliGRAM(s) Oral two times a day  simvastatin 10 milliGRAM(s) Oral at bedtime    MEDICATIONS  (PRN):  ondansetron Injectable 4 milliGRAM(s) IV Push every 4 hours PRN Nausea

## 2018-08-24 NOTE — PROGRESS NOTE ADULT - SUBJECTIVE AND OBJECTIVE BOX
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 Pt is sp dilatation and esophageal stent in June 2018. Also is sp chemo port recently.     Pt was directly admitted to the oncology floor for 5 days of Cisplatin and 5-FU. (21 Aug 2018 14:38)     Dr. Flores to continue RT at the [facility].    CHEMO day # 2-3    Tolerating chemotherapy very well.  No adverse effects.    PAST MEDICAL & SURGICAL HISTORY:  High cholesterol  Hypothyroid  HTN (hypertension)  Head and neck cancer: 2007  History of head and neck cancer    FAMILY HISTORY:  Family history of stomach cancer (Father, Sibling)    REVIEW OF SYSTEMS  Dysphagia and weight loss., but is able to eat.  No N/V/D  No bleeding  No pain.  General:	       PHYSICAL EXAM:  Alert and oriented  Mild pallor  No icterus  No cervical LNs  Lungs: Clear  Heart: RR  Abd: Non-tender  No HSM  Exts: No CCE, no evidence of the DVT on the bedside evaluation.    CBC Full  -  ( 24 Aug 2018 08:23 )  WBC Count : 10.6 K/uL  Hemoglobin : 10.2 g/dL  Hematocrit : 32.3 %  Platelet Count - Automated : 406 K/uL  Mean Cell Volume : 82.2 fl  Mean Cell Hemoglobin : 26.0 pg  Mean Cell Hemoglobin Concentration : 31.6 g/dL  Auto Neutrophil # : x  Auto Lymphocyte # : x  Auto Monocyte # : x  Auto Eosinophil # : x  Auto Basophil # : x  Auto Neutrophil % : x  Auto Lymphocyte % : x  Auto Monocyte % : x  Auto Eosinophil % : x  Auto Basophil % : x    24 Aug 2018 08:23    133    |  95     |  14.0   ----------------------------<  118    4.8     |  27.0   |  0.75     Ca    8.6        24 Aug 2018 08:23          Squamous cell carcinoma esophagus  Getting RT  To get Cisplatin x 1 and 4 days of CI 5FU.  IV hydration.  Anti-emetics.

## 2018-08-24 NOTE — PROGRESS NOTE ADULT - ASSESSMENT
Pt is a 74 yo male with pmh of esophageal cancer in 2007 sp chemo and radiation, htn, hypothyroidism, hypercholesterolemia who presented for chemotherapy due to esophageal cancer reoccurrence. Oncology on board. Palliative consulted for GOC. Will start IV hydration as he is on FU    1. 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 Pt is sp dilatation and esophageal stent in June 2018.  Sp chemoport recently.   - Pt was directly admitted to the oncology floor for 5 days of Cisplatin and 5-FU. Today is day 4 on chemotherapy, last day tomorrow 8/25.   - Hemo/Onc and Palliative consulted appreciated    - [facility]    2. HTN:  - Continue with Norvasc  - BP controlled     3. HLD:   - Continue with Zocor.     4. Hypothyroidism:  - Continue with levothyroxine.     5. Gerd:  - Continue with Protonix.     Supportive care. Pt is a 72 yo male with pmh of esophageal cancer in 2007 sp chemo and radiation, htn, hypothyroidism, hypercholesterolemia who presented for chemotherapy due to esophageal cancer reoccurrence. Oncology on board. Palliative consulted for GOC. Will start IV hydration as he is on FU    1. 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 Pt is sp dilatation and esophageal stent in June 2018.  Sp chemoport recently.   - Pt was directly admitted to the oncology floor for 5 days of Cisplatin and 5-FU. Today is day 4 on chemotherapy, last day tomorrow 8/25.   - Hemo/Onc and Palliative consulted appreciated    - [facility]    2. HTN:  - Continue with Norvasc  - BP controlled     3. HLD:   - Continue with Zocor.     4. Hypothyroidism:  - Continue with levothyroxine.     5. Gerd:  - Continue with Protonix.     6. Hyponatremia  - IVF , recheck BMP in am    Supportive care. Pt is a 74 yo male with pmh of esophageal cancer in 2007 sp chemo and radiation, htn, hypothyroidism, hypercholesterolemia who presented for chemotherapy due to esophageal cancer reoccurrence. Oncology on board. Palliative consulted for GOC. Will start IV hydration as he is on FU    1. 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 Pt is sp dilatation and esophageal stent in June 2018.  Sp chemoport recently.   - Pt was directly admitted to the oncology floor for 5 days of Cisplatin and 5-FU. Today is day 4 on chemotherapy, last day tomorrow 8/25.   - Hemo/Onc and Palliative consulted appreciated    - [facility]    2. HTN:  - Continue with Norvasc  - BP controlled     3. HLD:   - Continue with Zocor.     4. Hypothyroidism:  - Continue with levothyroxine.     5. Gerd:  - Continue with Protonix.     6. Hyponatremia  - IVF , recheck BMP in am    DVT PPE - lovenox     Supportive care. Pt is a 72 yo male with pmh of esophageal cancer in 2007 sp chemo and radiation, htn, hypothyroidism, hypercholesterolemia who presented for chemotherapy due to esophageal cancer reoccurrence. Oncology on board. Palliative consulted for GOC. Will start IV hydration as he is on FU    1. 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 Pt is sp dilatation and esophageal stent in June 2018.  Sp chemoport recently.   - Pt was directly admitted to the oncology floor for 5 days of Cisplatin and 5-FU. Today is day 4 on chemotherapy, last day tomorrow 8/25.   - Hemo/Onc and Palliative consulted appreciated    - [facility]    2. HTN:  - Continue with Norvasc  - BP controlled     3. HLD:   - Continue with Zocor.     4. Hypothyroidism:  - Continue with levothyroxine.     5. Gerd:  - Continue with Protonix.     6. Hyponatremia  - switch IVF to D5/NS at 75cc/hr , recheck BMP in am    DVT PPE - lovenox     Supportive care.

## 2018-08-25 LAB
ANION GAP SERPL CALC-SCNC: 12 MMOL/L — SIGNIFICANT CHANGE UP (ref 5–17)
BUN SERPL-MCNC: 10 MG/DL — SIGNIFICANT CHANGE UP (ref 8–20)
CALCIUM SERPL-MCNC: 8.7 MG/DL — SIGNIFICANT CHANGE UP (ref 8.6–10.2)
CHLORIDE SERPL-SCNC: 97 MMOL/L — LOW (ref 98–107)
CO2 SERPL-SCNC: 27 MMOL/L — SIGNIFICANT CHANGE UP (ref 22–29)
CREAT SERPL-MCNC: 0.64 MG/DL — SIGNIFICANT CHANGE UP (ref 0.5–1.3)
GLUCOSE SERPL-MCNC: 115 MG/DL — SIGNIFICANT CHANGE UP (ref 70–115)
HCT VFR BLD CALC: 30.9 % — LOW (ref 42–52)
HGB BLD-MCNC: 10 G/DL — LOW (ref 14–18)
MCHC RBC-ENTMCNC: 26.6 PG — LOW (ref 27–31)
MCHC RBC-ENTMCNC: 32.4 G/DL — SIGNIFICANT CHANGE UP (ref 32–36)
MCV RBC AUTO: 82.2 FL — SIGNIFICANT CHANGE UP (ref 80–94)
PLATELET # BLD AUTO: 337 K/UL — SIGNIFICANT CHANGE UP (ref 150–400)
POTASSIUM SERPL-MCNC: 4.5 MMOL/L — SIGNIFICANT CHANGE UP (ref 3.5–5.3)
POTASSIUM SERPL-SCNC: 4.5 MMOL/L — SIGNIFICANT CHANGE UP (ref 3.5–5.3)
RBC # BLD: 3.76 M/UL — LOW (ref 4.6–6.2)
RBC # FLD: 14.9 % — SIGNIFICANT CHANGE UP (ref 11–15.6)
SODIUM SERPL-SCNC: 136 MMOL/L — SIGNIFICANT CHANGE UP (ref 135–145)
WBC # BLD: 8.1 K/UL — SIGNIFICANT CHANGE UP (ref 4.8–10.8)
WBC # FLD AUTO: 8.1 K/UL — SIGNIFICANT CHANGE UP (ref 4.8–10.8)

## 2018-08-25 PROCEDURE — 99233 SBSQ HOSP IP/OBS HIGH 50: CPT

## 2018-08-25 RX ORDER — LACTULOSE 10 G/15ML
15 SOLUTION ORAL ONCE
Qty: 0 | Refills: 0 | Status: COMPLETED | OUTPATIENT
Start: 2018-08-25 | End: 2018-08-25

## 2018-08-25 RX ADMIN — AMLODIPINE BESYLATE 5 MILLIGRAM(S): 2.5 TABLET ORAL at 05:45

## 2018-08-25 RX ADMIN — LACTULOSE 15 GRAM(S): 10 SOLUTION ORAL at 14:14

## 2018-08-25 RX ADMIN — ENOXAPARIN SODIUM 30 MILLIGRAM(S): 100 INJECTION SUBCUTANEOUS at 05:45

## 2018-08-25 RX ADMIN — Medication 25 MICROGRAM(S): at 05:46

## 2018-08-25 RX ADMIN — ENOXAPARIN SODIUM 30 MILLIGRAM(S): 100 INJECTION SUBCUTANEOUS at 16:53

## 2018-08-25 RX ADMIN — SIMVASTATIN 10 MILLIGRAM(S): 20 TABLET, FILM COATED ORAL at 21:12

## 2018-08-25 RX ADMIN — SODIUM CHLORIDE 75 MILLILITER(S): 9 INJECTION, SOLUTION INTRAVENOUS at 07:46

## 2018-08-25 RX ADMIN — PANTOPRAZOLE SODIUM 40 MILLIGRAM(S): 20 TABLET, DELAYED RELEASE ORAL at 16:53

## 2018-08-25 RX ADMIN — Medication 325 MILLIGRAM(S): at 16:53

## 2018-08-25 RX ADMIN — SIMVASTATIN 10 MILLIGRAM(S): 20 TABLET, FILM COATED ORAL at 00:15

## 2018-08-25 RX ADMIN — Medication 81 MILLIGRAM(S): at 11:10

## 2018-08-25 RX ADMIN — Medication 325 MILLIGRAM(S): at 05:45

## 2018-08-25 RX ADMIN — PANTOPRAZOLE SODIUM 40 MILLIGRAM(S): 20 TABLET, DELAYED RELEASE ORAL at 05:46

## 2018-08-25 NOTE — DIETITIAN INITIAL EVALUATION ADULT. - PERTINENT LABORATORY DATA
08-24 Na133 mmol/L<L> Glu 118 mg/dL<H> K+ 4.8 mmol/L Cr  0.75 mg/dL BUN 14.0 mg/dL Phos n/a   Alb n/a   PAB n/a

## 2018-08-25 NOTE — PROGRESS NOTE ADULT - ASSESSMENT
73 y.o. male with hx of esophageal cancer in 2007 sp chemo and radiation, htn, hypothyroidism, hypercholesterolemia who presented for chemotherapy due to esophageal cancer recurrence. Patient actively undergoing chemotherapy with cisplatin and 5FU, last dose today. Will monitor for additional 24 hours, fluid status/ electrolytes.  Oncology continues to follow the patient. If pt remains stable anticipated discharge in 24 hours.     1. 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 Pt is sp dilatation and esophageal stent in June 2018.  S/p chemoport recently.   - Pt was directly admitted to the oncology floor for 5 days of Cisplatin and 5-FU. Today is the last dose   -c/w IVF   -f/u bmp/ electrolytes in the am  -will monitor for any signs or evidence of toxicity and monitor renal fxn closely  -pt currently tolerating diet    -c/w zofran prn   - Hemo/Onc f/u noted and appreciated and d/w Dr. Boucher the plan of care   -Pt to c/w radiation as an outpt with Dr. Flores     2. Hyponatremia - resolved likely due to dehydration/ chemotherapy   -Na stable 136 today  -pt encouraged to orally hydrate   - c/w IVF  D5/NS at 75cc/hr  -f/u bmp in the am     3. HTN:  - BP controlled   - C/w Norvasc 5mg po qd     4. HLD:   - C/w Zocor 10mg po qhs     5. Hypothyroidism:  - C/w levothyroxine 25mcg po qd     6. Gerd:  - C/w Protonix 40mg po bid    7.  Normocytic anemia likely due to chronic disease from malignancy  -stable h/h   -c/w ferrous sulfate 325mg po qd     8. DVT PPx -  -c/w lovenox 40mg sq qd     9. Severe protein calorie malnutrition   -c/w ensure po tid   -nutrition consult noted and appreciated     Dispo: Anticipated discharge in 24 hours if pt remains medically stable. PT consulted.

## 2018-08-25 NOTE — PROGRESS NOTE ADULT - SUBJECTIVE AND OBJECTIVE BOX
Patient is a 73y old  Male who presents with a chief complaint of Chemotherapy (21 Aug 2018 14:38)      HEALTH ISSUES - PROBLEM Dx:  Palliative care encounter: Palliative care encounter  Dysphagia, unspecified type: Dysphagia, unspecified type  Protein calorie malnutrition: Protein calorie malnutrition  Chemotherapy session for neoplasm: Chemotherapy session for neoplasm  Malignant neoplasm of middle third of esophagus: Malignant neoplasm of middle third of esophagus          INTERVAL HPI/OVERNIGHT EVENTS: Frisian Int ID# [id number]   Patient seen and examined at bedside. No acute events overnight. Patient states he is overall feeling well, has complaints of feeling constipated for a few days and is requesting something to have a bm. Aside from this is tolerating diet and is anticipating chemo and then does want to return to home when feasible. Patient denies chest pain, SOB, abd pain, N/V, fever, chills, dysuria or any other complaints. All remainder ROS negative.     Vital Signs Last 24 Hrs  T(C): 36.6 (25 Aug 2018 15:16), Max: 37.2 (24 Aug 2018 23:17)  T(F): 97.8 (25 Aug 2018 15:16), Max: 99 (24 Aug 2018 23:17)  HR: 60 (25 Aug 2018 15:16) (55 - 70)  BP: 122/67 (25 Aug 2018 15:16) (109/68 - 125/67)  BP(mean): --  RR: 18 (25 Aug 2018 15:16) (18 - 18)  SpO2: 100% (25 Aug 2018 15:16) (99% - 100%)    I&O's Summary    24 Aug 2018 07:01  -  25 Aug 2018 07:00  --------------------------------------------------------  IN: 2142 mL / OUT: 0 mL / NET: 2142 mL    25 Aug 2018 07:01  -  25 Aug 2018 15:47  --------------------------------------------------------  IN: 235.6 mL / OUT: 0 mL / NET: 235.6 mL        CONSTITUTIONAL: Well appearing, cachectic, awake, alert and in no apparent distress  ENMT: Airway patent, Nasal mucosa clear. Mouth with normal mucosa. Throat has no vesicles, no oropharyngeal exudates and uvula is midline.  EYES: Clear bilaterally, pupils equal, round and reactive to light. EOMI.  CARDIAC: Normal rate, regular rhythm.  Heart sounds S1, S2.  No murmurs, rubs or gallops    Chemoport in place C/D/I   RESPIRATORY: Breath sounds clear and equal bilaterally. No wheezes, rhales or rhonchi  GASTROENTEROLOGY: Soft, nt nd bs + normoactive   EXTREMITIES: No edema, cyanosis or deformity   NEUROLOGICAL: Alert and oriented, no focal deficits, no motor or sensory deficits. Dysarthria at baseline   SKIN: No rash, skin turgor poor     MEDICATIONS  (STANDING):  amLODIPine   Tablet 5 milliGRAM(s) Oral daily  aspirin enteric coated 81 milliGRAM(s) Oral daily  dextrose 5% + sodium chloride 0.9%. 1000 milliLiter(s) (75 mL/Hr) IV Continuous <Continuous>  enoxaparin Injectable 30 milliGRAM(s) SubCutaneous two times a day  ferrous    sulfate 325 milliGRAM(s) Oral two times a day  heparin  flush 100 Units/mL Injectable 300 Unit(s) IV Push once  levothyroxine 25 MICROGram(s) Oral daily  pantoprazole    Tablet 40 milliGRAM(s) Oral two times a day  simvastatin 10 milliGRAM(s) Oral at bedtime    MEDICATIONS  (PRN):  ondansetron Injectable 4 milliGRAM(s) IV Push every 4 hours PRN Nausea      LABS:                        10.0   8.1   )-----------( 337      ( 25 Aug 2018 08:12 )             30.9     08-25    136  |  97<L>  |  10.0  ----------------------------<  115  4.5   |  27.0  |  0.64    Ca    8.7      25 Aug 2018 08:12        RADIOLOGY & ADDITIONAL TESTS:  No new imaging studies

## 2018-08-25 NOTE — CHART NOTE - NSCHARTNOTEFT_GEN_A_CORE
Upon Nutritional Assessment by the Registered Dietitian your patient was determined to meet criteria / has evidence of the following diagnosis/diagnoses:          [ ]  Mild Protein Calorie Malnutrition        [ ]  Moderate Protein Calorie Malnutrition        [x ] Severe Protein Calorie Malnutrition  CHRONIC        [ ] Unspecified Protein Calorie Malnutrition        [ ] Underweight / BMI <19        [ ] Morbid Obesity / BMI > 40      Findings as based on:  •  Comprehensive nutrition assessment and consultation  •  Calorie counts (nutrient intake analysis)  •  Food acceptance and intake status from observations by staff  •  Follow up  •  Patient education  •  Intervention secondary to interdisciplinary rounds  •   concerns      Treatment:    The following diet has been recommended:  Rec Ensure TID      PROVIDER Section:     By signing this assessment you are acknowledging and agree with the diagnosis/diagnoses assigned by the Registered Dietitian    Comments:

## 2018-08-25 NOTE — PROGRESS NOTE ADULT - SUBJECTIVE AND OBJECTIVE BOX
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 Pt is sp dilatation and esophageal stent in June 2018. Also is sp chemo port recently.     Pt was directly admitted to the oncology floor for 5 days of Cisplatin and 5-FU. (21 Aug 2018 14:38)     Dr. Flores to continue RT at the [facility].    CHEMO day # 4-5    Tolerating chemotherapy very well.  No adverse effects. Denies nausea/vomitting    PAST MEDICAL & SURGICAL HISTORY:  High cholesterol  Hypothyroid  HTN (hypertension)  Head and neck cancer: 2007  History of head and neck cancer    FAMILY HISTORY:  Family history of stomach cancer (Father, Sibling)    REVIEW OF SYSTEMS  Dysphagia and weight loss., but is able to eat.  No N/V/D  No bleeding  No pain.  General:	       PHYSICAL EXAM:  Alert and oriented  Mild pallor  No icterus  No cervical LNs  Lungs: Clear  Heart: RR  Abd: Non-tender  No HSM  Exts: No CCE, no evidence of the DVT on the bedside evaluation.  CBC 8/25/18:  WBC 8.1, H/H 10/30.9; plt ct 337,000  CBC Full  -  ( 24 Aug 2018 08:23 )  WBC Count : 10.6 K/uL  Hemoglobin : 10.2 g/dL  Hematocrit : 32.3 %  Platelet Count - Automated : 406 K/uL  Mean Cell Volume : 82.2 fl  Mean Cell Hemoglobin : 26.0 pg  Mean Cell Hemoglobin Concentration : 31.6 g/dL  Auto Neutrophil # : x  Auto Lymphocyte # : x  Auto Monocyte # : x  Auto Eosinophil # : x  Auto Basophil # : x  Auto Neutrophil % : x  Auto Lymphocyte % : x  Auto Monocyte % : x  Auto Eosinophil % : x  Auto Basophil % : x    24 Aug 2018 08:23    133    |  95     |  14.0   ----------------------------<  118    4.8     |  27.0   |  0.75     Ca    8.6        24 Aug 2018 08:23          Squamous cell carcinoma esophagus  Getting RT  To get Cisplatin x 1 and 4 days of CI 5FU.  IV hydration.  Anti-emetics.  Monitor Cr, Mag, K and supplement as needed.  To see Dr. Delacruz at discharge in office/RT with Dr. Flores

## 2018-08-26 ENCOUNTER — TRANSCRIPTION ENCOUNTER (OUTPATIENT)
Age: 73
End: 2018-08-26

## 2018-08-26 VITALS
SYSTOLIC BLOOD PRESSURE: 122 MMHG | HEART RATE: 64 BPM | RESPIRATION RATE: 18 BRPM | DIASTOLIC BLOOD PRESSURE: 76 MMHG | TEMPERATURE: 99 F | OXYGEN SATURATION: 97 %

## 2018-08-26 LAB
ANION GAP SERPL CALC-SCNC: 10 MMOL/L — SIGNIFICANT CHANGE UP (ref 5–17)
BUN SERPL-MCNC: 6 MG/DL — LOW (ref 8–20)
CALCIUM SERPL-MCNC: 8.6 MG/DL — SIGNIFICANT CHANGE UP (ref 8.6–10.2)
CHLORIDE SERPL-SCNC: 98 MMOL/L — SIGNIFICANT CHANGE UP (ref 98–107)
CO2 SERPL-SCNC: 29 MMOL/L — SIGNIFICANT CHANGE UP (ref 22–29)
CREAT SERPL-MCNC: 0.65 MG/DL — SIGNIFICANT CHANGE UP (ref 0.5–1.3)
GLUCOSE SERPL-MCNC: 116 MG/DL — HIGH (ref 70–115)
MAGNESIUM SERPL-MCNC: 1.9 MG/DL — SIGNIFICANT CHANGE UP (ref 1.6–2.6)
PHOSPHATE SERPL-MCNC: 2.2 MG/DL — LOW (ref 2.4–4.7)
POTASSIUM SERPL-MCNC: 4.4 MMOL/L — SIGNIFICANT CHANGE UP (ref 3.5–5.3)
POTASSIUM SERPL-SCNC: 4.4 MMOL/L — SIGNIFICANT CHANGE UP (ref 3.5–5.3)
SODIUM SERPL-SCNC: 137 MMOL/L — SIGNIFICANT CHANGE UP (ref 135–145)

## 2018-08-26 PROCEDURE — 99239 HOSP IP/OBS DSCHRG MGMT >30: CPT

## 2018-08-26 RX ORDER — ASPIRIN/CALCIUM CARB/MAGNESIUM 324 MG
1 TABLET ORAL
Qty: 0 | Refills: 0 | COMMUNITY

## 2018-08-26 RX ORDER — SIMVASTATIN 20 MG/1
1 TABLET, FILM COATED ORAL
Qty: 0 | Refills: 0 | COMMUNITY
Start: 2018-08-26

## 2018-08-26 RX ORDER — LEVOTHYROXINE SODIUM 125 MCG
1 TABLET ORAL
Qty: 0 | Refills: 0 | COMMUNITY

## 2018-08-26 RX ORDER — LEVOTHYROXINE SODIUM 125 MCG
1 TABLET ORAL
Qty: 0 | Refills: 0 | COMMUNITY
Start: 2018-08-26

## 2018-08-26 RX ORDER — SIMVASTATIN 20 MG/1
1 TABLET, FILM COATED ORAL
Qty: 0 | Refills: 0 | COMMUNITY

## 2018-08-26 RX ORDER — AMLODIPINE BESYLATE 2.5 MG/1
1 TABLET ORAL
Qty: 0 | Refills: 0 | COMMUNITY
Start: 2018-08-26

## 2018-08-26 RX ORDER — AMLODIPINE BESYLATE 2.5 MG/1
1 TABLET ORAL
Qty: 0 | Refills: 0 | COMMUNITY

## 2018-08-26 RX ORDER — ASPIRIN/CALCIUM CARB/MAGNESIUM 324 MG
1 TABLET ORAL
Qty: 0 | Refills: 0 | COMMUNITY
Start: 2018-08-26

## 2018-08-26 RX ORDER — POTASSIUM PHOSPHATE, MONOBASIC POTASSIUM PHOSPHATE, DIBASIC 236; 224 MG/ML; MG/ML
15 INJECTION, SOLUTION INTRAVENOUS ONCE
Qty: 0 | Refills: 0 | Status: COMPLETED | OUTPATIENT
Start: 2018-08-26 | End: 2018-08-26

## 2018-08-26 RX ORDER — PANTOPRAZOLE SODIUM 20 MG/1
1 TABLET, DELAYED RELEASE ORAL
Qty: 0 | Refills: 0 | COMMUNITY
Start: 2018-08-26

## 2018-08-26 RX ADMIN — Medication 300 UNIT(S): at 16:41

## 2018-08-26 RX ADMIN — Medication 325 MILLIGRAM(S): at 06:13

## 2018-08-26 RX ADMIN — Medication 81 MILLIGRAM(S): at 12:33

## 2018-08-26 RX ADMIN — ENOXAPARIN SODIUM 30 MILLIGRAM(S): 100 INJECTION SUBCUTANEOUS at 06:13

## 2018-08-26 RX ADMIN — PANTOPRAZOLE SODIUM 40 MILLIGRAM(S): 20 TABLET, DELAYED RELEASE ORAL at 06:13

## 2018-08-26 RX ADMIN — AMLODIPINE BESYLATE 5 MILLIGRAM(S): 2.5 TABLET ORAL at 06:13

## 2018-08-26 RX ADMIN — SODIUM CHLORIDE 75 MILLILITER(S): 9 INJECTION, SOLUTION INTRAVENOUS at 12:32

## 2018-08-26 RX ADMIN — Medication 25 MICROGRAM(S): at 06:13

## 2018-08-26 RX ADMIN — Medication 325 MILLIGRAM(S): at 16:40

## 2018-08-26 RX ADMIN — POTASSIUM PHOSPHATE, MONOBASIC POTASSIUM PHOSPHATE, DIBASIC 62.5 MILLIMOLE(S): 236; 224 INJECTION, SOLUTION INTRAVENOUS at 12:32

## 2018-08-26 RX ADMIN — ENOXAPARIN SODIUM 30 MILLIGRAM(S): 100 INJECTION SUBCUTANEOUS at 16:40

## 2018-08-26 RX ADMIN — PANTOPRAZOLE SODIUM 40 MILLIGRAM(S): 20 TABLET, DELAYED RELEASE ORAL at 16:40

## 2018-08-26 NOTE — DISCHARGE NOTE ADULT - PLAN OF CARE
Continue with home medications. prevent worsening Continue with chemotherapy as per Dr. Delacruz. Follow up with Dr. Flores for radiation. Continue with dysphagia diet. Aspiration precautions. Continue with oral hydration. Continue with dysphagia diet. Continue with swallow therapy.

## 2018-08-26 NOTE — PROGRESS NOTE ADULT - ASSESSMENT
73 y.o. male with hx of esophageal cancer in 2007 sp chemo and radiation, htn, hypothyroidism, hypercholesterolemia who presented for chemotherapy due to esophageal cancer recurrence. Patient actively underwent chemotherapy with cisplatin and 5FU finished course on 8/25/18. Pt was monitored for an additional 24 hours, hydrated and all electrolytes and renal function were checked and remained wnl. Hypophosphatemia noted and was replaced. Pt remained hemodynamically stable was cleared by oncology for disposition to resume chemo and radiation as an outpt with scheduled appt. Pt also evaluated by swallow therapist and advised to remain on Dysphagia 2 mechanical soft diet. Pt to c/w swallow therapy as an outpt. Patient medically stable to be discharged home to f/u with pmd 3-5 days, oncology and radiation as an outpt.      1. 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 Pt is sp dilatation and esophageal stent in June 2018.  S/p chemoport recently.   - Pt was directly admitted to the oncology floor for 5 days of Cisplatin and 5-FU. Last dose given yest.   -d/c IVF   -replaced hypophosphatemia with potassium phos x1 dose  -will monitor for any signs or evidence of toxicity and monitor renal fxn closely  -pt currently tolerating diet    -c/w zofran prn   - Hemo/Onc f/u noted and appreciated and pt to f/u as an outpt   -Pt to c/w radiation as an outpt with Dr. Flores     2. Hyponatremia - resolved likely due to dehydration/ chemotherapy   -Na stable 137 today  -pt encouraged to orally hydrate   -d/c IVF     3. HTN:  - BP controlled   - C/w Norvasc 5mg po qd     4. HLD:   - C/w Zocor 10mg po qhs     5. Hypothyroidism:  - C/w levothyroxine 25mcg po qd     6. Gerd:  - C/w Protonix 40mg po bid    7.  Normocytic anemia likely due to chronic disease from malignancy  -stable h/h   -c/w ferrous sulfate 325mg po qd     8. DVT PPx -  -c/w lovenox 40mg sq qd     9. Severe protein calorie malnutrition   -c/w ensure po tid   -nutrition consult noted and appreciated     Dispo: Anticipated discharge today to home.

## 2018-08-26 NOTE — DISCHARGE NOTE ADULT - PATIENT PORTAL LINK FT
You can access the [facility] Portal, offered by [facility], by registering with the following website: [email or web address]

## 2018-08-26 NOTE — DISCHARGE NOTE ADULT - CARE PROVIDERS DIRECT ADDRESSES
,DirectAddress_Unknown,[email or web address]

## 2018-08-26 NOTE — DISCHARGE NOTE ADULT - HOSPITAL COURSE
73 y.o. male with hx of esophageal cancer in 2007 sp chemo and radiation, htn, hypothyroidism, hypercholesterolemia who presented for chemotherapy due to esophageal cancer recurrence. Patient actively underwent chemotherapy with cisplatin and 5FU finished course on 8/25/18. Pt was monitored for an additional 24 hours, hydrated and all electrolytes and renal function were checked and remained wnl. Hypophosphatemia noted and was replaced. Pt remained hemodynamically stable was cleared by oncology for disposition to resume chemo and radiation as an outpt with scheduled appt. Pt also evaluated by swallow therapist and advised to remain on Dysphagia 2 mechanical soft diet. Pt to c/w swallow therapy as an outpt. Patient medically stable to be discharged home to f/u with pmd 3-5 days, oncology and radiation as an outpt.        Discharge planning took 45 minutes

## 2018-08-26 NOTE — PROGRESS NOTE ADULT - SUBJECTIVE AND OBJECTIVE BOX
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 Pt is sp dilatation and esophageal stent in June 2018. Also is sp chemo port recently.     Pt was directly admitted to the oncology floor for 5 days of Cisplatin and 5-FU. (21 Aug 2018 14:38), completed without complication     Dr. Flores to continue RT at the [facility].        Tolerated chemotherapy very well.  No adverse effects. Denies nausea/vomitting    PAST MEDICAL & SURGICAL HISTORY:  High cholesterol  Hypothyroid  HTN (hypertension)  Head and neck cancer: 2007  History of head and neck cancer    FAMILY HISTORY:  Family history of stomach cancer (Father, Sibling)    REVIEW OF SYSTEMS  Dysphagia and weight loss., but is able to eat.  No N/V/D  No bleeding  No pain.  General:	       PHYSICAL EXAM:  Alert and oriented  Mild pallor  No icterus  No cervical LNs  Lungs: Clear  Heart: RR  Abd: Non-tender  No HSM  Exts: No CCE, no evidence of the DVT on the bedside evaluation.    8/26/18:  Cr 0.65, Mag 1.9  CBC 8/25/18:  WBC 8.1, H/H 10/30.9; plt ct 337,000  CBC Full  -  ( 24 Aug 2018 08:23 )  WBC Count : 10.6 K/uL  Hemoglobin : 10.2 g/dL  Hematocrit : 32.3 %  Platelet Count - Automated : 406 K/uL  Mean Cell Volume : 82.2 fl  Mean Cell Hemoglobin : 26.0 pg  Mean Cell Hemoglobin Concentration : 31.6 g/dL  Auto Neutrophil # : x  Auto Lymphocyte # : x  Auto Monocyte # : x  Auto Eosinophil # : x  Auto Basophil # : x  Auto Neutrophil % : x  Auto Lymphocyte % : x  Auto Monocyte % : x  Auto Eosinophil % : x  Auto Basophil % : x    24 Aug 2018 08:23    133    |  95     |  14.0   ----------------------------<  118    4.8     |  27.0   |  0.75     Ca    8.6        24 Aug 2018 08:23          Squamous cell carcinoma esophagus  Getting RT  Received  Cisplatin x 1 and 4 days of CI 5FU.  IV hydration.  Anti-emetics.  Monitor Cr, Mag, K and supplement as needed.  To see Dr. Delacruz at discharge in office/RT with Dr. Flores  Anticipate D/C

## 2018-08-26 NOTE — PROGRESS NOTE ADULT - SUBJECTIVE AND OBJECTIVE BOX
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 No acute events overnight. Patient states he feels good, has no complaints understands his plan of care and follow up with oncology and radiation as an outpt. Patient denies chest pain, SOB, abd pain, N/V, fever, chills, dysuria or any other complaints. All remainder ROS negative.     Vital Signs Last 24 Hrs  T(C): 36.6 (25 Aug 2018 23:13), Max: 36.6 (25 Aug 2018 15:16)  T(F): 97.9 (25 Aug 2018 23:13), Max: 97.9 (25 Aug 2018 23:13)  HR: 75 (25 Aug 2018 23:13) (60 - 75)  BP: 127/69 (25 Aug 2018 23:13) (122/67 - 127/69)  BP(mean): --  RR: 18 (25 Aug 2018 23:13) (18 - 18)  SpO2: 99% (25 Aug 2018 23:13) (99% - 100%)    I&O's Summary    25 Aug 2018 07:01  -  26 Aug 2018 07:00  --------------------------------------------------------  IN: 1478 mL / OUT: 400 mL / NET: 1078 mL      CONSTITUTIONAL: Well appearing, cachectic, awake, alert and in no apparent distress  ENMT: Airway patent, Nasal mucosa clear. Mouth with normal mucosa. Throat has no vesicles, no oropharyngeal exudates and uvula is midline.  EYES: Clear bilaterally, pupils equal, round and reactive to light. EOMI.  CARDIAC: Normal rate, regular rhythm.  Heart sounds S1, S2.  No murmurs, rubs or gallops    Chemoport in place C/D/I   RESPIRATORY: Breath sounds clear and equal bilaterally. No wheezes, rhales or rhonchi  GASTROENTEROLOGY: Soft, nt nd bs + normoactive   EXTREMITIES: No edema, cyanosis or deformity   NEUROLOGICAL: Alert and oriented, no focal deficits, no motor or sensory deficits. Dysarthria at baseline   SKIN: No rash, skin turgor poor     MEDICATIONS  (STANDING):  amLODIPine   Tablet 5 milliGRAM(s) Oral daily  aspirin enteric coated 81 milliGRAM(s) Oral daily  dextrose 5% + sodium chloride 0.9%. 1000 milliLiter(s) (75 mL/Hr) IV Continuous <Continuous>  enoxaparin Injectable 30 milliGRAM(s) SubCutaneous two times a day  ferrous    sulfate 325 milliGRAM(s) Oral two times a day  heparin  flush 100 Units/mL Injectable 300 Unit(s) IV Push once  levothyroxine 25 MICROGram(s) Oral daily  pantoprazole    Tablet 40 milliGRAM(s) Oral two times a day  potassium phosphate IVPB 15 milliMole(s) IV Intermittent once  simvastatin 10 milliGRAM(s) Oral at bedtime    MEDICATIONS  (PRN):  ondansetron Injectable 4 milliGRAM(s) IV Push every 4 hours PRN Nausea      LABS:                        10.0   8.1   )-----------( 337      ( 25 Aug 2018 08:12 )             30.9     08-26    137  |  98  |  6.0<L>  ----------------------------<  116<H>  4.4   |  29.0  |  0.65    Ca    8.6      26 Aug 2018 07:20  Phos  2.2     08-26  Mg     1.9     08-26        RADIOLOGY & ADDITIONAL TESTS:  No new imaging studies

## 2018-08-26 NOTE — DISCHARGE NOTE ADULT - ADDITIONAL INSTRUCTIONS
Please follow up with primary care physician in 3-5 days. Please follow up with oncologist Dr. Delacruz and radiation oncologist Dr. Flores within the week.

## 2018-08-26 NOTE — DISCHARGE NOTE ADULT - CARE PLAN
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 Delacruz. Follow up with Dr. Flores for radiation. Continue with dysphagia diet. Aspiration precautions. Continue with oral hydration.  Secondary Diagnosis:	Dysphagia, unspecified type  Assessment and plan of treatment:	Continue with dysphagia diet. Continue with swallow therapy.  Secondary Diagnosis:	Hypertension, unspecified type  Assessment and plan of treatment:	Continue with home medications.  Secondary Diagnosis:	High cholesterol  Assessment and plan of treatment:	Continue with home medications.  Secondary Diagnosis:	Hypothyroid  Assessment and plan of treatment:	Continue with home medications.  Secondary Diagnosis:	Protein calorie malnutrition  Assessment and plan of treatment:	Continue with home medications.

## 2018-08-26 NOTE — DISCHARGE NOTE ADULT - SECONDARY DIAGNOSIS.
High cholesterol Hypothyroid Protein calorie malnutrition Dysphagia, unspecified type Hypertension, unspecified type

## 2018-08-26 NOTE — DISCHARGE NOTE ADULT - MEDICATION SUMMARY - MEDICATIONS TO TAKE
I will START or STAY ON the medications listed below when I get home from the hospital:    aspirin 81 mg oral delayed release tablet  -- 1 tab(s) by mouth once a day  -- Indication: For Prophylaxis     Zofran 4 mg oral tablet  -- 1 tab(s) by mouth every 8 hours, As Needed  -- Indication: For nausea vomiting     simvastatin 10 mg oral tablet  -- 1 tab(s) by mouth once a day (at bedtime)  -- Indication: For hyperlipidemia     amLODIPine 5 mg oral tablet  -- 1 tab(s) by mouth once a day  -- Indication: For hypertension    ferrous sulfate 325 mg (65 mg elemental iron) oral tablet  -- 1 tab(s) by mouth 2 times a day  -- Indication: For anemia     pantoprazole 40 mg oral delayed release tablet  -- 1 tab(s) by mouth 2 times a day  -- Indication: For Prophylaxis     levothyroxine 25 mcg (0.025 mg) oral tablet  -- 1 tab(s) by mouth once a day  -- Indication: For hypothryoid

## 2018-08-26 NOTE — PROGRESS NOTE ADULT - PROVIDER SPECIALTY LIST ADULT
Heme/Onc
Hospitalist
Hospitalist
Internal Medicine
Internal Medicine
Hospitalist

## 2018-08-26 NOTE — DISCHARGE NOTE ADULT - CARE PROVIDER_API CALL
Herlinda Delacruz), Hematology; Medical Oncology  [address]  [address]  [place] [address]  Phone: [phone]  Fax: [phone]    Arthur Flores), Radiation Oncology  [address] [place] [address]  Phone: [phone]  Fax: [phone]

## 2018-08-27 VITALS
RESPIRATION RATE: 18 BRPM | WEIGHT: 99.8 LBS | DIASTOLIC BLOOD PRESSURE: 58 MMHG | SYSTOLIC BLOOD PRESSURE: 87 MMHG | BODY MASS INDEX: 18.25 KG/M2 | HEART RATE: 60 BPM | OXYGEN SATURATION: 100 %

## 2018-08-27 PROCEDURE — 77387B: CUSTOM | Mod: 26

## 2018-08-28 PROCEDURE — 77427 RADIATION TX MANAGEMENT X5: CPT

## 2018-08-28 PROCEDURE — 77387B: CUSTOM | Mod: 26

## 2018-08-29 PROCEDURE — 77387B: CUSTOM | Mod: 26

## 2018-08-30 PROCEDURE — 77387B: CUSTOM | Mod: 26

## 2018-08-31 PROCEDURE — 77014: CPT | Mod: 26

## 2018-09-04 VITALS
RESPIRATION RATE: 16 BRPM | DIASTOLIC BLOOD PRESSURE: 47 MMHG | SYSTOLIC BLOOD PRESSURE: 89 MMHG | OXYGEN SATURATION: 99 % | HEIGHT: 62 IN | TEMPERATURE: 97.9 F | WEIGHT: 99.8 LBS | HEART RATE: 68 BPM | BODY MASS INDEX: 18.37 KG/M2

## 2018-09-04 PROCEDURE — 77387B: CUSTOM | Mod: 26

## 2018-09-05 PROCEDURE — 77387B: CUSTOM | Mod: 26

## 2018-09-05 PROCEDURE — 77427 RADIATION TX MANAGEMENT X5: CPT

## 2018-09-06 PROCEDURE — 77387B: CUSTOM | Mod: 26

## 2018-09-07 PROCEDURE — 77014: CPT | Mod: 26

## 2018-09-10 VITALS
TEMPERATURE: 98.1 F | BODY MASS INDEX: 18.03 KG/M2 | RESPIRATION RATE: 16 BRPM | HEIGHT: 62 IN | WEIGHT: 98 LBS | OXYGEN SATURATION: 95 % | SYSTOLIC BLOOD PRESSURE: 90 MMHG | DIASTOLIC BLOOD PRESSURE: 70 MMHG | HEART RATE: 65 BPM

## 2018-09-10 PROCEDURE — 77387B: CUSTOM | Mod: 26

## 2018-09-10 NOTE — VITALS
[Maximal Pain Intensity: 1/10] : 1/10 [Least Pain Intensity: 0/10] : 0/10 [Pain Description/Quality: ___] : Pain description/quality: [unfilled] [Pain Duration: ___] : Pain duration: [unfilled] [Pain Location: ___] : Pain Location: [unfilled] [90: Able to carry normal activity; minor signs or symptoms of disease.] : 90: Able to carry normal activity; minor signs or symptoms of disease.  [ECOG Performance Status: 1 - Restricted in physically strenuous activity but ambulatory and able to carry out work of a light or sedentary nature] : Performance Status: 1 - Restricted in physically strenuous activity but ambulatory and able to carry out work of a light or sedentary nature, e.g., light house work, office work [Pain Interferes with ADLs] : Pain does not interfere with activities of daily living

## 2018-09-10 NOTE — DISEASE MANAGEMENT
[Clinical] : TNM Stage: c [TTNM] : 3 [NTNM] : 2 [MTNM] : 0 [IIIB] : IIIB [de-identified] : 2127 [de-identified] : 2877 [de-identified] : esophagus

## 2018-09-10 NOTE — REVIEW OF SYSTEMS
[Constipation: Grade 0] : Constipation: Grade 0 [Diarrhea: Grade 0] : Diarrhea: Grade 0 [Oral Pain: Grade 0] : Oral Pain: Grade 0 [Dermatitis Radiation: Grade 0] : Dermatitis Radiation: Grade 0 [Nausea: Grade 1 - Loss of appetite without alteration in eating habits] : Nausea: Grade 1 - Loss of appetite without alteration in eating habits [Vomiting: Grade 0] : Vomiting: Grade 0 [Mucositis Oral: Grade 1 - Asymptomatic or mild symptoms; intervention not indicated] : Mucositis Oral: Grade 1 - Asymptomatic or mild symptoms; intervention not indicated [Xerostomia: Grade 1 - Symptomatic (e.g., dry or thick saliva) without significant dietary alteration; unstimulated saliva flow >0.2 ml/min] : Xerostomia: Grade 1 - Symptomatic (e.g., dry or thick saliva) without significant dietary alteration; unstimulated saliva flow >0.2 ml/min [Pruritus: Grade 0] : Pruritus: Grade 0 [Skin Atrophy: Grade 0] : Skin Atrophy: Grade 0 [Skin Hyperpigmentation: Grade 0] : Skin Hyperpigmentation: Grade 0 [Skin Induration: Grade 0] : Skin Induration: Grade 0

## 2018-09-10 NOTE — PHYSICAL EXAM
[No Spine Tenderness] : no tenderness to palpation of the vertebral spine [Normal] : oriented to person, place and time, the affect was normal, the mood was normal and not anxious [de-identified] : s/p partial glossectomy

## 2018-09-11 PROCEDURE — 77387B: CUSTOM | Mod: 26

## 2018-09-12 PROCEDURE — 77427 RADIATION TX MANAGEMENT X5: CPT

## 2018-09-12 PROCEDURE — 77387B: CUSTOM | Mod: 26

## 2018-09-13 PROCEDURE — 77014: CPT | Mod: 26

## 2018-09-14 PROCEDURE — 77387B: CUSTOM | Mod: 26

## 2018-09-17 VITALS
SYSTOLIC BLOOD PRESSURE: 108 MMHG | WEIGHT: 97.6 LBS | DIASTOLIC BLOOD PRESSURE: 64 MMHG | HEART RATE: 60 BPM | OXYGEN SATURATION: 100 % | RESPIRATION RATE: 16 BRPM | BODY MASS INDEX: 17.85 KG/M2

## 2018-09-17 PROCEDURE — 77387B: CUSTOM | Mod: 26

## 2018-09-17 PROCEDURE — 77427 RADIATION TX MANAGEMENT X5: CPT

## 2018-09-17 NOTE — REVIEW OF SYSTEMS
[Constipation: Grade 0] : Constipation: Grade 0 [Diarrhea: Grade 0] : Diarrhea: Grade 0 [Nausea: Grade 1 - Loss of appetite without alteration in eating habits] : Nausea: Grade 1 - Loss of appetite without alteration in eating habits [Vomiting: Grade 0] : Vomiting: Grade 0 [Xerostomia: Grade 1 - Symptomatic (e.g., dry or thick saliva) without significant dietary alteration; unstimulated saliva flow >0.2 ml/min] : Xerostomia: Grade 1 - Symptomatic (e.g., dry or thick saliva) without significant dietary alteration; unstimulated saliva flow >0.2 ml/min [Oral Pain: Grade 0] : Oral Pain: Grade 0 [Pruritus: Grade 0] : Pruritus: Grade 0 [Skin Atrophy: Grade 0] : Skin Atrophy: Grade 0 [Skin Hyperpigmentation: Grade 0] : Skin Hyperpigmentation: Grade 0 [Skin Induration: Grade 0] : Skin Induration: Grade 0 [Dermatitis Radiation: Grade 0] : Dermatitis Radiation: Grade 0 [Fatigue: Grade 1 - Fatigue relieved by rest] : Fatigue: Grade 1 - Fatigue relieved by rest [Mucositis Oral: Grade 1 - Asymptomatic or mild symptoms; intervention not indicated] : Mucositis Oral: Grade 1 - Asymptomatic or mild symptoms; intervention not indicated

## 2018-09-18 ENCOUNTER — INPATIENT (INPATIENT)
Facility: HOSPITAL | Age: 73
LOS: 4 days | Discharge: HOME | DRG: 846 | End: 2018-09-23
Attending: INTERNAL MEDICINE | Admitting: INTERNAL MEDICINE
Payer: MEDICARE

## 2018-09-18 VITALS — HEIGHT: 62 IN | WEIGHT: 94.14 LBS

## 2018-09-18 DIAGNOSIS — C15.5 MALIGNANT NEOPLASM OF LOWER THIRD OF ESOPHAGUS: ICD-10-CM

## 2018-09-18 DIAGNOSIS — Z85.89 PERSONAL HISTORY OF MALIGNANT NEOPLASM OF OTHER ORGANS AND SYSTEMS: Chronic | ICD-10-CM

## 2018-09-18 LAB
ALBUMIN SERPL ELPH-MCNC: 3.4 G/DL — SIGNIFICANT CHANGE UP (ref 3.3–5.2)
ALP SERPL-CCNC: 63 U/L — SIGNIFICANT CHANGE UP (ref 40–120)
ALT FLD-CCNC: 8 U/L — SIGNIFICANT CHANGE UP
ANION GAP SERPL CALC-SCNC: 12 MMOL/L — SIGNIFICANT CHANGE UP (ref 5–17)
AST SERPL-CCNC: 18 U/L — SIGNIFICANT CHANGE UP
BILIRUB SERPL-MCNC: 0.2 MG/DL — LOW (ref 0.4–2)
BUN SERPL-MCNC: 18 MG/DL — SIGNIFICANT CHANGE UP (ref 8–20)
CALCIUM SERPL-MCNC: 9 MG/DL — SIGNIFICANT CHANGE UP (ref 8.6–10.2)
CHLORIDE SERPL-SCNC: 99 MMOL/L — SIGNIFICANT CHANGE UP (ref 98–107)
CO2 SERPL-SCNC: 27 MMOL/L — SIGNIFICANT CHANGE UP (ref 22–29)
CREAT SERPL-MCNC: 0.6 MG/DL — SIGNIFICANT CHANGE UP (ref 0.5–1.3)
GLUCOSE SERPL-MCNC: 85 MG/DL — SIGNIFICANT CHANGE UP (ref 70–115)
HCT VFR BLD CALC: 30.5 % — LOW (ref 42–52)
HGB BLD-MCNC: 9.2 G/DL — LOW (ref 14–18)
MCHC RBC-ENTMCNC: 26 PG — LOW (ref 27–31)
MCHC RBC-ENTMCNC: 30.2 G/DL — LOW (ref 32–36)
MCV RBC AUTO: 86.2 FL — SIGNIFICANT CHANGE UP (ref 80–94)
PLATELET # BLD AUTO: 356 K/UL — SIGNIFICANT CHANGE UP (ref 150–400)
POTASSIUM SERPL-MCNC: 4.2 MMOL/L — SIGNIFICANT CHANGE UP (ref 3.5–5.3)
POTASSIUM SERPL-SCNC: 4.2 MMOL/L — SIGNIFICANT CHANGE UP (ref 3.5–5.3)
PROT SERPL-MCNC: 7.1 G/DL — SIGNIFICANT CHANGE UP (ref 6.6–8.7)
RBC # BLD: 3.54 M/UL — LOW (ref 4.6–6.2)
RBC # FLD: 18.7 % — HIGH (ref 11–15.6)
SODIUM SERPL-SCNC: 138 MMOL/L — SIGNIFICANT CHANGE UP (ref 135–145)
WBC # BLD: 4.6 K/UL — LOW (ref 4.8–10.8)
WBC # FLD AUTO: 4.6 K/UL — LOW (ref 4.8–10.8)

## 2018-09-18 RX ORDER — FLUOROURACIL 50 MG/ML
1400 INJECTION, SOLUTION INTRAVENOUS DAILY
Qty: 0 | Refills: 0 | Status: COMPLETED | OUTPATIENT
Start: 2018-09-18 | End: 2018-09-21

## 2018-09-18 RX ORDER — GRANISETRON HYDROCHLORIDE 1 MG/1
1 TABLET, FILM COATED ORAL ONCE
Qty: 0 | Refills: 0 | Status: COMPLETED | OUTPATIENT
Start: 2018-09-18 | End: 2018-09-18

## 2018-09-18 RX ORDER — LEVOTHYROXINE SODIUM 125 MCG
25 TABLET ORAL DAILY
Qty: 0 | Refills: 0 | Status: DISCONTINUED | OUTPATIENT
Start: 2018-09-18 | End: 2018-09-23

## 2018-09-18 RX ORDER — DEXAMETHASONE 0.5 MG/5ML
20 ELIXIR ORAL ONCE
Qty: 0 | Refills: 0 | Status: COMPLETED | OUTPATIENT
Start: 2018-09-18 | End: 2018-09-18

## 2018-09-18 RX ORDER — INFLUENZA VIRUS VACCINE 15; 15; 15; 15 UG/.5ML; UG/.5ML; UG/.5ML; UG/.5ML
0.5 SUSPENSION INTRAMUSCULAR ONCE
Qty: 0 | Refills: 0 | Status: COMPLETED | OUTPATIENT
Start: 2018-09-18 | End: 2018-09-23

## 2018-09-18 RX ORDER — ACETAMINOPHEN 500 MG
650 TABLET ORAL EVERY 6 HOURS
Qty: 0 | Refills: 0 | Status: DISCONTINUED | OUTPATIENT
Start: 2018-09-18 | End: 2018-09-23

## 2018-09-18 RX ORDER — FUROSEMIDE 40 MG
40 TABLET ORAL ONCE
Qty: 0 | Refills: 0 | Status: COMPLETED | OUTPATIENT
Start: 2018-09-18 | End: 2018-09-18

## 2018-09-18 RX ORDER — ASPIRIN/CALCIUM CARB/MAGNESIUM 324 MG
81 TABLET ORAL DAILY
Qty: 0 | Refills: 0 | Status: DISCONTINUED | OUTPATIENT
Start: 2018-09-18 | End: 2018-09-23

## 2018-09-18 RX ORDER — MANNITOL
25 POWDER (GRAM) MISCELLANEOUS ONCE
Qty: 0 | Refills: 0 | Status: COMPLETED | OUTPATIENT
Start: 2018-09-18 | End: 2018-09-18

## 2018-09-18 RX ORDER — PANTOPRAZOLE SODIUM 20 MG/1
40 TABLET, DELAYED RELEASE ORAL
Qty: 0 | Refills: 0 | Status: DISCONTINUED | OUTPATIENT
Start: 2018-09-18 | End: 2018-09-23

## 2018-09-18 RX ORDER — DIPHENHYDRAMINE HCL 50 MG
25 CAPSULE ORAL EVERY 6 HOURS
Qty: 0 | Refills: 0 | Status: DISCONTINUED | OUTPATIENT
Start: 2018-09-18 | End: 2018-09-23

## 2018-09-18 RX ORDER — CISPLATIN 1 MG/ML
100 INJECTION, SOLUTION INTRAVENOUS ONCE
Qty: 0 | Refills: 0 | Status: COMPLETED | OUTPATIENT
Start: 2018-09-18 | End: 2018-09-19

## 2018-09-18 RX ORDER — ENOXAPARIN SODIUM 100 MG/ML
30 INJECTION SUBCUTANEOUS DAILY
Qty: 0 | Refills: 0 | Status: DISCONTINUED | OUTPATIENT
Start: 2018-09-18 | End: 2018-09-23

## 2018-09-18 RX ORDER — SIMVASTATIN 20 MG/1
10 TABLET, FILM COATED ORAL AT BEDTIME
Qty: 0 | Refills: 0 | Status: DISCONTINUED | OUTPATIENT
Start: 2018-09-18 | End: 2018-09-23

## 2018-09-18 RX ORDER — ONDANSETRON 8 MG/1
8 TABLET, FILM COATED ORAL EVERY 6 HOURS
Qty: 0 | Refills: 0 | Status: DISCONTINUED | OUTPATIENT
Start: 2018-09-18 | End: 2018-09-23

## 2018-09-18 RX ORDER — ONDANSETRON 8 MG/1
8 TABLET, FILM COATED ORAL EVERY 6 HOURS
Qty: 0 | Refills: 0 | Status: DISCONTINUED | OUTPATIENT
Start: 2018-09-18 | End: 2018-09-18

## 2018-09-18 RX ORDER — SODIUM CHLORIDE 9 MG/ML
1000 INJECTION, SOLUTION INTRAVENOUS
Qty: 0 | Refills: 0 | Status: COMPLETED | OUTPATIENT
Start: 2018-09-18 | End: 2018-09-18

## 2018-09-18 RX ADMIN — Medication 40 MILLIGRAM(S): at 21:47

## 2018-09-18 RX ADMIN — Medication 81 MILLIGRAM(S): at 17:20

## 2018-09-18 RX ADMIN — SODIUM CHLORIDE 333 MILLILITER(S): 9 INJECTION, SOLUTION INTRAVENOUS at 17:59

## 2018-09-18 RX ADMIN — ENOXAPARIN SODIUM 30 MILLIGRAM(S): 100 INJECTION SUBCUTANEOUS at 17:20

## 2018-09-18 RX ADMIN — Medication 166 GRAM(S): at 22:36

## 2018-09-18 RX ADMIN — PANTOPRAZOLE SODIUM 40 MILLIGRAM(S): 20 TABLET, DELAYED RELEASE ORAL at 17:20

## 2018-09-18 RX ADMIN — GRANISETRON HYDROCHLORIDE 1 MILLIGRAM(S): 1 TABLET, FILM COATED ORAL at 21:47

## 2018-09-18 RX ADMIN — SIMVASTATIN 10 MILLIGRAM(S): 20 TABLET, FILM COATED ORAL at 21:47

## 2018-09-18 RX ADMIN — Medication 2 MILLIGRAM(S): at 21:47

## 2018-09-18 RX ADMIN — Medication 110 MILLIGRAM(S): at 21:12

## 2018-09-18 NOTE — H&P ADULT - HISTORY OF PRESENT ILLNESS
The patient is a 72 yo male who has been admitted for C#2 of chemotherapy with cisplatin and 5-flurouracil. The patient last recieved chemotherapy on 8/18/18 with concurrent XRT. He reports he tolerated the chemotherapy well. He has been receiving procrit for anemia.     The patient has a previous history of head and neck cancer diagnosed in 2006 for which he underwent rescection and was found to have 3+ lymph nodes. He underwent adjuvant chemo/XRT    PAST MEDICAL & SURGICAL HISTORY:  High cholesterol  Hypothyroid  HTN (hypertension)  Head and neck cancer: 2007  History of head and neck cancer    FAMILY HISTORY:  Family history of stomach cancer (Father, Sibling)      home medication    aspirin  simvastatin  synthroid      Vital Signs Last 24 Hrs  T(C): --  T(F): --  HR: --  BP: --  BP(mean): --  RR: --  SpO2: --  CONSTITUTIONAL: The patient appears somwhat chachectic and is in no apparent distress  EYES: EOMI, no scleral icterus, conjunctiva clear,  EARS, NOSE, MOUTH, THROAT: s/p head and neck surgery. no palpable LAD  NECK: no thyromegaly, no cervical lymphadenopathy appreciated  PULMONARY: Clear to auscultation bilaterally, no wheezing or rhonchi, no use of accessory muscles  CARDIOVASCULAR: normal sinus rhythm, no murmurs, rubs or gallops, no peripheral edema  GASTROINTESTINAL: soft, nontender, nondistended, normoactive bowel sounds, no hepatomegaly or splenomegaly is appreciated  MUSCULOSKELETAL: no spinal tenderness to palpation. no joint swelling.   EXTREMITIES: No digital cyanosis or clubbing  LYMPH NODES: no cervical, no supraclavicular, axillary or inguinal lymphadenopathy  SKIN: no rashes, lesions, ulcers or bruising  PSYCH: alert and oriented x 3, appropriate affect The patient is a 72 yo male who has been admitted for C#2 of chemotherapy with cisplatin and 5-flurouracil. The patient last recieved chemotherapy on 8/18/18 with concurrent XRT. He reports he tolerated the chemotherapy well. He has been receiving procrit for anemia. The patient completed XRT yesterday and tolerated his treatment well. The patient denies any nausea or vomiting. He states he is tolerating oral intake. no neuropathy. no fevers    The patient has a previous history of head and neck cancer diagnosed in 2006 for which he underwent rescection and was found to have 3+ lymph nodes. He underwent adjuvant chemo/XRT    PAST MEDICAL & SURGICAL HISTORY:  High cholesterol  Hypothyroid  HTN (hypertension)  Head and neck cancer: 2007  History of head and neck cancer    FAMILY HISTORY:  Family history of stomach cancer (Father, Sibling)    NKDA    home medication    aspirin  simvastatin  synthroid      Vital Signs Last 24 Hrs  T(C): --  T(F): --  HR: --  BP: --  BP(mean): --  RR: --  SpO2: --  CONSTITUTIONAL: The patient appears somwhat chachectic and is in no apparent distress  EYES: EOMI, no scleral icterus, conjunctiva clear,  EARS, NOSE, MOUTH, THROAT: s/p head and neck surgery. no palpable LAD  NECK: no thyromegaly, no cervical lymphadenopathy appreciated  PULMONARY: Clear to auscultation bilaterally, no wheezing or rhonchi, no use of accessory muscles  CARDIOVASCULAR: normal sinus rhythm, no murmurs, rubs or gallops, no peripheral edema  GASTROINTESTINAL: soft, nontender, nondistended, normoactive bowel sounds, no hepatomegaly or splenomegaly is appreciated  MUSCULOSKELETAL: no spinal tenderness to palpation. no joint swelling.   EXTREMITIES: No digital cyanosis or clubbing  LYMPH NODES: no cervical, no supraclavicular, axillary or inguinal lymphadenopathy  SKIN: no rashes, lesions, ulcers or bruising  PSYCH: alert and oriented x 3, appropriate affect

## 2018-09-18 NOTE — H&P ADULT - NSHPREVIEWOFSYSTEMS_GEN_ALL_CORE
A/P: SCC of the mid esophagus: will start patient on cycle #2 of chemotherapy with cisplatin and 5-flurouracil. Side effects discussed and consent signed.     Hypothyroidism: continue on sythroid    DVT prophylaxis: lovenox    Antiemetics: scheduled and as needed

## 2018-09-19 LAB
ALBUMIN SERPL ELPH-MCNC: 3.2 G/DL — LOW (ref 3.3–5.2)
ALP SERPL-CCNC: 62 U/L — SIGNIFICANT CHANGE UP (ref 40–120)
ALT FLD-CCNC: 8 U/L — SIGNIFICANT CHANGE UP
ANION GAP SERPL CALC-SCNC: 14 MMOL/L — SIGNIFICANT CHANGE UP (ref 5–17)
AST SERPL-CCNC: 15 U/L — SIGNIFICANT CHANGE UP
BILIRUB SERPL-MCNC: <0.2 MG/DL — LOW (ref 0.4–2)
BUN SERPL-MCNC: 16 MG/DL — SIGNIFICANT CHANGE UP (ref 8–20)
CALCIUM SERPL-MCNC: 8.9 MG/DL — SIGNIFICANT CHANGE UP (ref 8.6–10.2)
CHLORIDE SERPL-SCNC: 97 MMOL/L — LOW (ref 98–107)
CO2 SERPL-SCNC: 27 MMOL/L — SIGNIFICANT CHANGE UP (ref 22–29)
CREAT SERPL-MCNC: 0.69 MG/DL — SIGNIFICANT CHANGE UP (ref 0.5–1.3)
GLUCOSE SERPL-MCNC: 211 MG/DL — HIGH (ref 70–115)
HCT VFR BLD CALC: 32.8 % — LOW (ref 42–52)
HGB BLD-MCNC: 9.8 G/DL — LOW (ref 14–18)
MCHC RBC-ENTMCNC: 25.7 PG — LOW (ref 27–31)
MCHC RBC-ENTMCNC: 29.9 G/DL — LOW (ref 32–36)
MCV RBC AUTO: 85.9 FL — SIGNIFICANT CHANGE UP (ref 80–94)
PLATELET # BLD AUTO: 358 K/UL — SIGNIFICANT CHANGE UP (ref 150–400)
POTASSIUM SERPL-MCNC: 4.6 MMOL/L — SIGNIFICANT CHANGE UP (ref 3.5–5.3)
POTASSIUM SERPL-SCNC: 4.6 MMOL/L — SIGNIFICANT CHANGE UP (ref 3.5–5.3)
PROT SERPL-MCNC: 6.8 G/DL — SIGNIFICANT CHANGE UP (ref 6.6–8.7)
RBC # BLD: 3.82 M/UL — LOW (ref 4.6–6.2)
RBC # FLD: 18.9 % — HIGH (ref 11–15.6)
SODIUM SERPL-SCNC: 138 MMOL/L — SIGNIFICANT CHANGE UP (ref 135–145)
WBC # BLD: 3.6 K/UL — LOW (ref 4.8–10.8)
WBC # FLD AUTO: 3.6 K/UL — LOW (ref 4.8–10.8)

## 2018-09-19 RX ADMIN — Medication 25 MICROGRAM(S): at 06:06

## 2018-09-19 RX ADMIN — ENOXAPARIN SODIUM 30 MILLIGRAM(S): 100 INJECTION SUBCUTANEOUS at 17:02

## 2018-09-19 RX ADMIN — PANTOPRAZOLE SODIUM 40 MILLIGRAM(S): 20 TABLET, DELAYED RELEASE ORAL at 06:06

## 2018-09-19 RX ADMIN — FLUOROURACIL 42.83 MILLIGRAM(S): 50 INJECTION, SOLUTION INTRAVENOUS at 04:16

## 2018-09-19 RX ADMIN — CISPLATIN 200 MILLIGRAM(S): 1 INJECTION, SOLUTION INTRAVENOUS at 00:26

## 2018-09-19 RX ADMIN — Medication 81 MILLIGRAM(S): at 17:02

## 2018-09-19 NOTE — PROGRESS NOTE ADULT - SUBJECTIVE AND OBJECTIVE BOX
The patient is a 74 yo male who has been admitted for C#2 of chemotherapy with Cisplatin and 5-flurouracil. The patient last received chemotherapy on 8/18/18 with concurrent XRT. He reports he tolerated the chemotherapy well. He has been receiving Procrit for anemia. The patient completed XRT yesterday and tolerated his treatment well. The patient denies any nausea or vomiting. He states he is tolerating oral intake. no neuropathy. no fevers    The patient has a previous history of head and neck cancer diagnosed in 2006 for which he underwent rescection and was found to have 3+ lymph nodes. He underwent adjuvant chemo/XRT    Tolerating chemo well.  No c/o.    PAST MEDICAL & SURGICAL HISTORY:  High cholesterol  Hypothyroid  HTN (hypertension)  Head and neck cancer: 2007  History of head and neck cancer    FAMILY HISTORY:  Family history of stomach cancer (Father, Sibling)    NKDA    home medication    aspirin  simvastatin  synthroid      Vital Signs Last 24 Hrs  T(C): 36.6 (19 Sep 2018 09:21), Max: 37.6 (18 Sep 2018 16:36)  T(F): 97.8 (19 Sep 2018 09:21), Max: 99.7 (18 Sep 2018 16:36)  HR: 67 (19 Sep 2018 09:21) (55 - 67)  BP: 100/61 (19 Sep 2018 09:21) (100/61 - 120/68)  BP(mean): --  RR: 18 (19 Sep 2018 09:21) (18 - 18)  SpO2: 97% (19 Sep 2018 09:21) (97% - 99%)    CBC Full  -  ( 19 Sep 2018 09:06 )  WBC Count : 3.6 K/uL  Hemoglobin : 9.8 g/dL  Hematocrit : 32.8 %  Platelet Count - Automated : 358 K/uL  Mean Cell Volume : 85.9 fl  Mean Cell Hemoglobin : 25.7 pg  Mean Cell Hemoglobin Concentration : 29.9 g/dL  Auto Neutrophil # : x  Auto Lymphocyte # : x  Auto Monocyte # : x  Auto Eosinophil # : x  Auto Basophil # : x  Auto Neutrophil % : x  Auto Lymphocyte % : x  Auto Monocyte % : x  Auto Eosinophil % : x  Auto Basophil % : x    09-19    138  |  97<L>  |  16.0  ----------------------------<  211<H>  4.6   |  27.0  |  0.69    Ca    8.9      19 Sep 2018 09:06    TPro  6.8  /  Alb  3.2<L>  /  TBili  <0.2<L>  /  DBili  x   /  AST  15  /  ALT  8   /  AlkPhos  62  09-19    CONSTITUTIONAL: The patient is in no apparent distress  EYES: EOMI, no scleral icterus, conjunctiva clear,  EARS, NOSE, MOUTH, THROAT: s/p head and neck surgery. no palpable LAD  NECK: no thyromegaly, no cervical lymphadenopathy appreciated  PULMONARY: Clear to auscultation bilaterally, no wheezing or rhonchi, no use of accessory muscles  CARDIOVASCULAR: normal sinus rhythm, no murmurs, rubs or gallops, no peripheral edema  GASTROINTESTINAL: soft, nontender, nondistended, normoactive bowel sounds, no hepatomegaly or splenomegaly is appreciated  MUSCULOSKELETAL: no spinal tenderness to palpation. no joint swelling.   EXTREMITIES: No digital cyanosis or clubbing  LYMPH NODES: no cervical, no supraclavicular, axillary or inguinal lymphadenopathy  SKIN: no rashes, lesions, ulcers or bruising  PSYCH: alert and oriented x 3, appropriate affect  Comfortable         Review of Systems:  Review of Systems: A/P: SCC of the mid esophagus: started patient on cycle #2 of chemotherapy with Cisplatin and 5-flurouracil.   Hypothyroidism: Continue on Synthroid.   DVT prophylaxis: Lovenox  Neupogen post chemo. Weekly Procrit.

## 2018-09-20 RX ADMIN — ONDANSETRON 8 MILLIGRAM(S): 8 TABLET, FILM COATED ORAL at 08:32

## 2018-09-20 RX ADMIN — ENOXAPARIN SODIUM 30 MILLIGRAM(S): 100 INJECTION SUBCUTANEOUS at 11:40

## 2018-09-20 RX ADMIN — Medication 81 MILLIGRAM(S): at 11:40

## 2018-09-20 RX ADMIN — PANTOPRAZOLE SODIUM 40 MILLIGRAM(S): 20 TABLET, DELAYED RELEASE ORAL at 06:12

## 2018-09-20 RX ADMIN — SIMVASTATIN 10 MILLIGRAM(S): 20 TABLET, FILM COATED ORAL at 00:32

## 2018-09-20 RX ADMIN — Medication 25 MICROGRAM(S): at 06:12

## 2018-09-20 RX ADMIN — ONDANSETRON 8 MILLIGRAM(S): 8 TABLET, FILM COATED ORAL at 19:25

## 2018-09-20 RX ADMIN — FLUOROURACIL 42.83 MILLIGRAM(S): 50 INJECTION, SOLUTION INTRAVENOUS at 04:03

## 2018-09-20 RX ADMIN — SIMVASTATIN 10 MILLIGRAM(S): 20 TABLET, FILM COATED ORAL at 22:17

## 2018-09-20 NOTE — DIETITIAN INITIAL EVALUATION ADULT. - OTHER INFO
Pt readmit from 1mo ago for chemotherapy. Wt from previous admissions 90# (8/25/18), 112# (6/25/18). 4# wt gain x 1 mo noted. Pt states appetite is good at home, and that he is eating well here. Denies having any pain or chew/swallow difficulties.

## 2018-09-20 NOTE — DIETITIAN INITIAL EVALUATION ADULT. - NS AS NUTRI INTERV COLLABORAT
Collaboration with other nutrition professionals/Pt may benefit from SLP referral to assess chew/swallow per previous nutrition notes

## 2018-09-20 NOTE — CHART NOTE - NSCHARTNOTEFT_GEN_A_CORE
Upon Nutritional Assessment by the Registered Dietitian your patient was determined to meet criteria / has evidence of the following diagnosis/diagnoses:          [x] Severe (chronic) Protein Calorie Malnutrition        [x] Underweight / BMI <19        Findings as based on:  •  Comprehensive nutrition assessment and consultation  •  Severe muscle wasting (temples, clavicles)  •  Moderate fat loss (buccals, orbital)  •  BMI 17.2  •  Intervention secondary to interdisciplinary rounds      Treatment:    The following diet has been recommended:  1) Pt may benefit from SLP referral due to chew/swallow difficulties in the past  2) Rx Ensure Enlive TID      PROVIDER Section:     By signing this assessment you are acknowledging and agree with the diagnosis/diagnoses assigned by the Registered Dietitian    Comments:

## 2018-09-20 NOTE — DIETITIAN INITIAL EVALUATION ADULT. - PHYSICAL APPEARANCE
BMI 17.2; NFPE showed severe wasting in temples, clavicles, moderate in buccals; edentulous/underweight

## 2018-09-20 NOTE — PROGRESS NOTE ADULT - SUBJECTIVE AND OBJECTIVE BOX
THO TURNER  73y  Male  [id number]      Patient is a 73y old  Male who presents with a chief complaint of Chemotherapy (21 Aug 2018 14:38)    HPI:  Pt is a 74 yo male with pmh of esophageal cancer in 2007 sp chemo and radiation, htn, hypothyroidism, hypercholesterolemia who presented for chemotherapy reoccurrence Pt is being following by Dr Delacruz and recently found to have 8cm middle third of esophagus squamous cell cancer after having complaint of dysphagia, weight loss. Pt is sp dilatation and esophageal stent in June 2018. Also is sp chemoport recently.     Pt was directly admitted to the oncology floor for 5 days of Cisplatin and 5-FU. (21 Aug 2018 14:38)    Orders written and discussed with pharmacy.  Patient is receiving 2nd 5-FU bag.  Complaining of nausea.  Has just received Zofran.  Denies mouth sores or diarrhea.       Dr. Flores to continue RT at the [facility].    PAST MEDICAL & SURGICAL HISTORY:  High cholesterol  Hypothyroid  HTN (hypertension)  Head and neck cancer: 2007  History of head and neck cancer    FAMILY HISTORY:  Family history of stomach cancer (Father, Sibling)    REVIEW OF SYSTEMS  Dysphagia and weight loss., but is able to eat.  No N/V/D  No bleeding  No pain.  General:	      PHYSICAL EXAM:  Alert and oriented  Appears thin  Surgical changes of H& n  Mild pallor  No icterus  No cervical LNs  Lungs: Clear  Heart: RR  Abd: Non-tender  No HSM  Exts: No CCE, no evidence of the DVT on the bedside evaluation.    Labs WBC:  3.6, H/H 9.8/32.8, plt ct 355  CBC Full  -  ( 22 Aug 2018 07:25 )  WBC Count : 9.4 K/uL  Hemoglobin : 9.8 g/dL  Hematocrit : 30.1 %  Platelet Count - Automated : 378 K/uL  Mean Cell Volume : 82.2 fl  Mean Cell Hemoglobin : 26.8 pg  Mean Cell Hemoglobin Concentration : 32.6 g/dL  Auto Neutrophil # : x  Auto Lymphocyte # : x  Auto Monocyte # : x  Auto Eosinophil # : x  Auto Basophil # : x  Auto Neutrophil % : 96.0 %  Auto Lymphocyte % : 2.0 %  Auto Monocyte % : 2.0 %  Auto Eosinophil % : x  Auto Basophil % : x    PT/INR - ( 21 Aug 2018 12:44 )   PT: 14.1 sec;   INR: 1.28 ratio           22 Aug 2018 07:25    139    |  99     |  20.0   ----------------------------<  144    4.4     |  29.0   |  0.68     Ca    8.9        22 Aug 2018 07:25  Mg     2.1       21 Aug 2018 10:29    TPro  7.1    /  Alb  3.1    /  TBili  0.2    /  DBili  x      /  AST  11     /  ALT  6      /  AlkPhos  60     22 Aug 2018 07:25      Squamous cell carcinoma esophagus  Getting RT  To get Cisplatin x 1 and 4 days of CI 5FU. Receiving 2nd day of 5FU; tolerating well, with nausea  IV hydration.  Anti-emetics. Received Zofran    Thank you.

## 2018-09-21 RX ADMIN — Medication 81 MILLIGRAM(S): at 12:01

## 2018-09-21 RX ADMIN — Medication 25 MICROGRAM(S): at 04:49

## 2018-09-21 RX ADMIN — SIMVASTATIN 10 MILLIGRAM(S): 20 TABLET, FILM COATED ORAL at 21:39

## 2018-09-21 RX ADMIN — PANTOPRAZOLE SODIUM 40 MILLIGRAM(S): 20 TABLET, DELAYED RELEASE ORAL at 04:49

## 2018-09-21 RX ADMIN — ENOXAPARIN SODIUM 30 MILLIGRAM(S): 100 INJECTION SUBCUTANEOUS at 12:01

## 2018-09-21 RX ADMIN — FLUOROURACIL 42.83 MILLIGRAM(S): 50 INJECTION, SOLUTION INTRAVENOUS at 04:48

## 2018-09-21 NOTE — HISTORY OF PRESENT ILLNESS
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  Denies dysphagia, odynophagia. Notes slight discomfort on right side of chest, intermittent, relieved with Tylenol.

## 2018-09-21 NOTE — CDI QUERY NOTE - NSCDIOTHERTXTBX_GEN_ALL_CORE_HH
H&P- " SCC of mid esophagus"  admitted for chemo.    As per nutrition consult-       "[x] Severe (chronic) Protein Calorie Malnutrition"    Findings as based on:  •  Comprehensive nutrition assessment and consultation  •  Severe muscle wasting (temples, clavicles)  •  Moderate fat loss (buccals, orbital)  •  BMI 17.2  •  Intervention secondary to interdisciplinary rounds      Treatment:    The following diet has been recommended:  1) Pt may benefit from SLP referral due to chew/swallow difficulties in the past  2) Rx Ensure Enlive TID    If you agree with nutrition consult please sign nutrition chart note or document dx in progress notes.  Thank you.

## 2018-09-21 NOTE — DISEASE MANAGEMENT
[Clinical] : TNM Stage: c [IIIB] : IIIB [TTNM] : 3 [NTNM] : 2 [MTNM] : 0 [de-identified] : 5,040 cGy [de-identified] : 5,040 cGy [de-identified] : middle esophagus

## 2018-09-21 NOTE — VITALS
[Maximal Pain Intensity: 1/10] : 1/10 [Least Pain Intensity: 0/10] : 0/10 [Pain Description/Quality: ___] : Pain description/quality: [unfilled] [Pain Duration: ___] : Pain duration: [unfilled] [Pain Location: ___] : Pain Location: [unfilled] [90: Able to carry normal activity; minor signs or symptoms of disease.] : 90: Able to carry normal activity; minor signs or symptoms of disease.  [ECOG Performance Status: 1 - Restricted in physically strenuous activity but ambulatory and able to carry out work of a light or sedentary nature] : Performance Status: 1 - Restricted in physically strenuous activity but ambulatory and able to carry out work of a light or sedentary nature, e.g., light house work, office work [Pain Scale Level Reviewed] : 0/10 [Pain Interferes with ADLs] : Pain does not interfere with activities of daily living

## 2018-09-21 NOTE — PROGRESS NOTE ADULT - SUBJECTIVE AND OBJECTIVE BOX
· Reason for Admission	chemotherapy for SCC of the esophagus	      · Subjective and Objective: 	      THO TURNER  73y  Male  [id number]      Patient is a 73y old  Male who presents with a chief complaint of Chemotherapy (21 Aug 2018 14:38)    HPI:  Pt is a 74 yo male with hx of laryngeal cancer in 2007 sp chemo and radiation, htn, hypothyroidism, hypercholesterolemia who presented for chemotherapy for a newly dxed squamous cell carcinoma esophagus. Pt wasrecently found to have 8cm middle third of esophagus squamous cell cancer after having complaint of dysphagia, weight loss. Pt is s/p dilatation and esophageal stent in June 2018. Also is s/p placement of port recently.     Pt was directly admitted to the oncology floor for 5 days of Cisplatin and 5-FU. (21 Aug 2018 14:38)      Complaining of nausea relieved with Zofran.  Denies mouth sores or diarrhea.           PAST MEDICAL & SURGICAL HISTORY:  High cholesterol  Hypothyroid  HTN (hypertension)  Head and neck cancer: 2007  History of head and neck cancer    FAMILY HISTORY:  Family history of stomach cancer (Father, Sibling)    REVIEW OF SYSTEMS  Dysphagia and weight loss., but is able to eat.  No N/V/D  No bleeding  No pain.  General:	      PHYSICAL EXAM:  Alert and oriented  Appears thin  Surgical changes of H& n  Mild pallor  No icterus  No cervical LNs  Lungs: Clear  Heart: RR  Abd: Non-tender  No HSM  Exts: No CCE, no evidence of the DVT on the bedside evaluation.        PT/INR - ( 21 Aug 2018 12:44 )   PT: 14.1 sec;   INR: 1.28 ratio           22 Aug 2018 07:25    139    |  99     |  20.0   ----------------------------<  144    4.4     |  29.0   |  0.68     Ca    8.9        22 Aug 2018 07:25  Mg     2.1       21 Aug 2018 10:29    TPro  7.1    /  Alb  3.1    /  TBili  0.2    /  DBili  x      /  AST  11     /  ALT  6      /  AlkPhos  60     22 Aug 2018 07:25      Squamous cell carcinoma esophagus.  To get Cisplatin x 1 and 4 days of CI 5FU. Receiving 2nd day of 5FU; tolerating well, with nausea  IV hydration.  Anti-emetics.  Neupogen   starting  24 hours post completion of chemo.

## 2018-09-21 NOTE — PHYSICAL EXAM
[No Spine Tenderness] : no tenderness to palpation of the vertebral spine [Normal] : oriented to person, place and time, the affect was normal, the mood was normal and not anxious [de-identified] : s/p partial glossectomy

## 2018-09-22 RX ADMIN — PANTOPRAZOLE SODIUM 40 MILLIGRAM(S): 20 TABLET, DELAYED RELEASE ORAL at 05:04

## 2018-09-22 RX ADMIN — FLUOROURACIL 42.83 MILLIGRAM(S): 50 INJECTION, SOLUTION INTRAVENOUS at 05:04

## 2018-09-22 RX ADMIN — Medication 25 MICROGRAM(S): at 05:04

## 2018-09-22 RX ADMIN — SIMVASTATIN 10 MILLIGRAM(S): 20 TABLET, FILM COATED ORAL at 20:59

## 2018-09-22 RX ADMIN — ENOXAPARIN SODIUM 30 MILLIGRAM(S): 100 INJECTION SUBCUTANEOUS at 16:42

## 2018-09-22 RX ADMIN — Medication 81 MILLIGRAM(S): at 16:43

## 2018-09-22 NOTE — CHART NOTE - NSCHARTNOTEFT_GEN_A_CORE
Source: Patient [x ]  Family [ ]   other [ ]    Current Diet: Regular, Ensure at bedside?? no order    PO intake:  < 50% [ ]   50-75%  [x ]   %  [ ]  other :    Source for PO intake [x ] Patient [ ] family [ ] chart [ ] staff [ ] other    Enteral /Parenteral Nutrition:     Current Weight: none new    % Weight Change     Pertinent Medications: MEDICATIONS  (STANDING):  aspirin enteric coated 81 milliGRAM(s) Oral daily  enoxaparin Injectable 30 milliGRAM(s) SubCutaneous daily  influenza   Vaccine 0.5 milliLiter(s) IntraMuscular once  levothyroxine 25 MICROGram(s) Oral daily  pantoprazole    Tablet 40 milliGRAM(s) Oral before breakfast  simvastatin 10 milliGRAM(s) Oral at bedtime    MEDICATIONS  (PRN):  acetaminophen   Tablet .. 650 milliGRAM(s) Oral every 6 hours PRN Temp greater or equal to 38.5C (101.3F), Moderate Pain (4 - 6)  diphenhydrAMINE   Capsule 25 milliGRAM(s) Oral every 6 hours PRN Rash and/or Itching  ondansetron Injectable 8 milliGRAM(s) IV Push every 6 hours PRN Nausea    Pertinent Labs:         Skin:     Nutrition focused physical exam previously conducted - found signs of malnutrition [ ]absent [ ]present    Subcutaneous fat loss: [x ] Orbital fat pads region, [x ]Buccal fat region, [ ]Triceps region,  [ ]Ribs region    Muscle wasting: [x ]Temples region, [x ]Clavicle region, [ ]Shoulder region, [ ]Scapula region, [ ]Interosseous region,  [ ]thigh region, [ ]Calf region    Estimated Needs:   [x ] no change since previous assessment  [ ] recalculated:     Current Nutrition Diagnosis:  ·  Malnutrition; severe chronic related to inadequate protein-energy intake in setting of esophageal CA, hx of head/neck CA   as evidenced by severe muscle wasting (temples, clavicle), moderate fat loss (buccal, orbital)      Recommendations: Rec Ensure BID be ordered    Monitoring and Evaluation:   [x ] PO intake [x ] Tolerance to diet prescription [X] Weights  [X] Follow up per protocol [X] Labs:

## 2018-09-22 NOTE — PROGRESS NOTE ADULT - SUBJECTIVE AND OBJECTIVE BOX
· Reason for Admission	chemotherapy for SCC of the esophagus	      · Subjective and Objective: 	      THO TURNER  73y  Male  [id number]      Patient is a 73y old  Male who presents with a chief complaint of Chemotherapy (21 Aug 2018 14:38)    HPI:  Pt is a 72 yo male with hx of laryngeal cancer in 2007 sp chemo and radiation, htn, hypothyroidism, hypercholesterolemia who presented for chemotherapy for a newly dxed squamous cell carcinoma esophagus. Pt wasrecently found to have 8cm middle third of esophagus squamous cell cancer after having complaint of dysphagia, weight loss. Pt is s/p dilatation and esophageal stent in June 2018. Also is s/p placement of port recently.     Pt was directly admitted to the oncology floor for 5 days of Cisplatin and 5-FU. (21 Aug 2018 14:38).      Currently denies complaints.  No further nausea; no vomiting  Denies mouth sores or diarrhea.           PAST MEDICAL & SURGICAL HISTORY:  High cholesterol  Hypothyroid  HTN (hypertension)  Head and neck cancer: 2007  History of head and neck cancer    FAMILY HISTORY:  Family history of stomach cancer (Father, Sibling)    REVIEW OF SYSTEMS  Dysphagia and weight loss., but is able to eat.  No N/V/D  No bleeding  No pain.  General:	      PHYSICAL EXAM:  Alert and oriented  Appears thin  Surgical changes of H& n  Mild pallor  No icterus  No cervical LNs  Lungs: Clear  Heart: RR  Abd: Non-tender  No HSM  Exts: No CCE, no evidence of the DVT on the bedside evaluation.        PT/INR - ( 21 Aug 2018 12:44 )   PT: 14.1 sec;   INR: 1.28 ratio           22 Aug 2018 07:25    139    |  99     |  20.0   ----------------------------<  144    4.4     |  29.0   |  0.68     Ca    8.9        22 Aug 2018 07:25  Mg     2.1       21 Aug 2018 10:29    TPro  7.1    /  Alb  3.1    /  TBili  0.2    /  DBili  x      /  AST  11     /  ALT  6      /  AlkPhos  60     22 Aug 2018 07:25      Squamous cell carcinoma esophagus.  To get Cisplatin x 1 and 4 days of CI 5FU. Receiving 4th  day of 5FU; tolerating well, no further  nausea  IV hydration.  Anti-emetics.  Neupogen   starting  24 hours post completion of chemo.  Will discharge when chemotherapy complete, as long as patient is clinically stable  labs ordered for AM

## 2018-09-22 NOTE — PROGRESS NOTE ADULT - REASON FOR ADMISSION
chemotherapy for SCC of the esophagus

## 2018-09-23 ENCOUNTER — TRANSCRIPTION ENCOUNTER (OUTPATIENT)
Age: 73
End: 2018-09-23

## 2018-09-23 VITALS
SYSTOLIC BLOOD PRESSURE: 130 MMHG | RESPIRATION RATE: 18 BRPM | TEMPERATURE: 98 F | OXYGEN SATURATION: 98 % | DIASTOLIC BLOOD PRESSURE: 72 MMHG | HEART RATE: 75 BPM

## 2018-09-23 LAB
ALBUMIN SERPL ELPH-MCNC: 3.1 G/DL — LOW (ref 3.3–5.2)
ALP SERPL-CCNC: 57 U/L — SIGNIFICANT CHANGE UP (ref 40–120)
ALT FLD-CCNC: 9 U/L — SIGNIFICANT CHANGE UP
ANION GAP SERPL CALC-SCNC: 11 MMOL/L — SIGNIFICANT CHANGE UP (ref 5–17)
AST SERPL-CCNC: 20 U/L — SIGNIFICANT CHANGE UP
BILIRUB SERPL-MCNC: 0.3 MG/DL — LOW (ref 0.4–2)
BUN SERPL-MCNC: 15 MG/DL — SIGNIFICANT CHANGE UP (ref 8–20)
CALCIUM SERPL-MCNC: 9.2 MG/DL — SIGNIFICANT CHANGE UP (ref 8.6–10.2)
CHLORIDE SERPL-SCNC: 94 MMOL/L — LOW (ref 98–107)
CO2 SERPL-SCNC: 31 MMOL/L — HIGH (ref 22–29)
CREAT SERPL-MCNC: 0.83 MG/DL — SIGNIFICANT CHANGE UP (ref 0.5–1.3)
GLUCOSE SERPL-MCNC: 87 MG/DL — SIGNIFICANT CHANGE UP (ref 70–115)
HCT VFR BLD CALC: 31.8 % — LOW (ref 42–52)
HGB BLD-MCNC: 9.8 G/DL — LOW (ref 14–18)
MCHC RBC-ENTMCNC: 26.1 PG — LOW (ref 27–31)
MCHC RBC-ENTMCNC: 30.8 G/DL — LOW (ref 32–36)
MCV RBC AUTO: 84.8 FL — SIGNIFICANT CHANGE UP (ref 80–94)
PLATELET # BLD AUTO: 343 K/UL — SIGNIFICANT CHANGE UP (ref 150–400)
POTASSIUM SERPL-MCNC: 5.6 MMOL/L — HIGH (ref 3.5–5.3)
POTASSIUM SERPL-SCNC: 5.6 MMOL/L — HIGH (ref 3.5–5.3)
PROT SERPL-MCNC: 6.7 G/DL — SIGNIFICANT CHANGE UP (ref 6.6–8.7)
RBC # BLD: 3.75 M/UL — LOW (ref 4.6–6.2)
RBC # FLD: 18.5 % — HIGH (ref 11–15.6)
SODIUM SERPL-SCNC: 136 MMOL/L — SIGNIFICANT CHANGE UP (ref 135–145)
WBC # BLD: 3 K/UL — LOW (ref 4.8–10.8)
WBC # FLD AUTO: 3 K/UL — LOW (ref 4.8–10.8)

## 2018-09-23 PROCEDURE — 80053 COMPREHEN METABOLIC PANEL: CPT

## 2018-09-23 PROCEDURE — 85027 COMPLETE CBC AUTOMATED: CPT

## 2018-09-23 PROCEDURE — T1013: CPT

## 2018-09-23 PROCEDURE — 90686 IIV4 VACC NO PRSV 0.5 ML IM: CPT

## 2018-09-23 PROCEDURE — 36415 COLL VENOUS BLD VENIPUNCTURE: CPT

## 2018-09-23 RX ORDER — AMLODIPINE BESYLATE 2.5 MG/1
1 TABLET ORAL
Qty: 0 | Refills: 0 | COMMUNITY
Start: 2018-09-23

## 2018-09-23 RX ORDER — ONDANSETRON 8 MG/1
15 TABLET, FILM COATED ORAL
Qty: 315 | Refills: 0 | OUTPATIENT
Start: 2018-09-23 | End: 2018-09-29

## 2018-09-23 RX ORDER — SIMVASTATIN 20 MG/1
1 TABLET, FILM COATED ORAL
Qty: 0 | Refills: 0 | COMMUNITY
Start: 2018-09-23

## 2018-09-23 RX ORDER — ASPIRIN/CALCIUM CARB/MAGNESIUM 324 MG
1 TABLET ORAL
Qty: 0 | Refills: 0 | COMMUNITY
Start: 2018-09-23

## 2018-09-23 RX ORDER — ONDANSETRON 8 MG/1
1 TABLET, FILM COATED ORAL
Qty: 0 | Refills: 0 | COMMUNITY

## 2018-09-23 RX ORDER — SIMVASTATIN 20 MG/1
10 TABLET, FILM COATED ORAL AT BEDTIME
Qty: 0 | Refills: 0 | Status: DISCONTINUED | OUTPATIENT
Start: 2018-09-23 | End: 2018-09-23

## 2018-09-23 RX ORDER — ASPIRIN/CALCIUM CARB/MAGNESIUM 324 MG
81 TABLET ORAL DAILY
Qty: 0 | Refills: 0 | Status: DISCONTINUED | OUTPATIENT
Start: 2018-09-23 | End: 2018-09-23

## 2018-09-23 RX ORDER — HEPARIN SODIUM 5000 [USP'U]/ML
300 INJECTION INTRAVENOUS; SUBCUTANEOUS ONCE
Qty: 0 | Refills: 0 | Status: DISCONTINUED | OUTPATIENT
Start: 2018-09-23 | End: 2018-09-23

## 2018-09-23 RX ORDER — FILGRASTIM 480MCG/1.6
300 VIAL (ML) INJECTION ONCE
Qty: 0 | Refills: 0 | Status: COMPLETED | OUTPATIENT
Start: 2018-09-23 | End: 2018-09-23

## 2018-09-23 RX ORDER — AMLODIPINE BESYLATE 2.5 MG/1
5 TABLET ORAL DAILY
Qty: 0 | Refills: 0 | Status: DISCONTINUED | OUTPATIENT
Start: 2018-09-23 | End: 2018-09-23

## 2018-09-23 RX ORDER — ASPIRIN/CALCIUM CARB/MAGNESIUM 324 MG
30 TABLET ORAL
Qty: 30 | Refills: 0 | OUTPATIENT
Start: 2018-09-23 | End: 2018-10-22

## 2018-09-23 RX ADMIN — Medication 25 MICROGRAM(S): at 05:26

## 2018-09-23 RX ADMIN — ENOXAPARIN SODIUM 30 MILLIGRAM(S): 100 INJECTION SUBCUTANEOUS at 12:30

## 2018-09-23 RX ADMIN — PANTOPRAZOLE SODIUM 40 MILLIGRAM(S): 20 TABLET, DELAYED RELEASE ORAL at 05:26

## 2018-09-23 RX ADMIN — AMLODIPINE BESYLATE 5 MILLIGRAM(S): 2.5 TABLET ORAL at 16:42

## 2018-09-23 RX ADMIN — Medication 300 UNIT(S): at 16:40

## 2018-09-23 RX ADMIN — Medication 81 MILLIGRAM(S): at 12:30

## 2018-09-23 RX ADMIN — Medication 81 MILLIGRAM(S): at 16:42

## 2018-09-23 RX ADMIN — INFLUENZA VIRUS VACCINE 0.5 MILLILITER(S): 15; 15; 15; 15 SUSPENSION INTRAMUSCULAR at 16:40

## 2018-09-23 RX ADMIN — Medication 300 MICROGRAM(S): at 16:43

## 2018-09-23 NOTE — DISCHARGE NOTE ADULT - MEDICATION SUMMARY - MEDICATIONS TO TAKE
I will START or STAY ON the medications listed below when I get home from the hospital:    aspirin 81 mg oral delayed release tablet  -- 1 tab(s) by mouth once a day  -- Indication: For Malignant neoplasm of lower third of esophagus    Zofran 4 mg oral tablet  -- 15 tab(s) by mouth every 8 hours, As Needed   -- Indication: For Malignant neoplasm of lower third of esophagus    simvastatin 10 mg oral tablet  -- 1 tab(s) by mouth once a day (at bedtime)  -- Indication: For cholesterol    simvastatin 10 mg oral tablet  -- 1 tab(s) by mouth once a day (at bedtime)  -- Indication: For cholesterol    amLODIPine 5 mg oral tablet  -- 1 tab(s) by mouth once a day  -- Indication: For high blood pressure    amLODIPine 5 mg oral tablet  -- 1 tab(s) by mouth once a day  -- Indication: For high blood pressure    ferrous sulfate 325 mg (65 mg elemental iron) oral tablet  -- 1 tab(s) by mouth 2 times a day  -- Indication: For anemia    pantoprazole 40 mg oral delayed release tablet  -- 1 tab(s) by mouth 2 times a day  -- Indication: For Malignant neoplasm of lower third of esophagus    levothyroxine 25 mcg (0.025 mg) oral tablet  -- 1 tab(s) by mouth once a day  -- Indication: For thyroid

## 2018-09-23 NOTE — DISCHARGE NOTE ADULT - HOSPITAL COURSE
presents with a chief complaint of Chemotherapy  Pt is a 74 yo male with hx of laryngeal cancer in 2007 sp chemo and radiation, htn, hypothyroidism, hypercholesterolemia who presented for chemotherapy for a newly dxed squamous cell carcinoma esophagus. Pt was found to have 8cm middle third of esophagus squamous cell cancer after having complaint of dysphagia, weight loss. Pt is s/p dilatation and esophageal stent in June 2018. Also is s/p placement of port.  He was admitted for chemotherapy, which he received without complication (5 days CDDP and 5FU)    Hospital course:  Received Cisplatin x 1 and 4 days of CI 5FU. Receiving 4th  day of 5FU; tolerated well  Received Zoftran  Neupogen   starting  24 hours post completion of chemo. To complete in office post  discharge  Clinically stable for discharge                            9.8    3.0   )-----------( 343      ( 23 Sep 2018 08:54 )             31.8     Comprehensive Metabolic Panel in AM (09.23.18 @ 08:54)    Sodium, Serum: 136 mmol/L    Potassium, Serum: 5.6: Mild hemolysis.  Results may be falsely elevated. mmol/L    Chloride, Serum: 94 mmol/L    Carbon Dioxide, Serum: 31.0 mmol/L    Anion Gap, Serum: 11 mmol/L    Blood Urea Nitrogen, Serum: 15.0 mg/dL    Creatinine, Serum: 0.83 mg/dL    Glucose, Serum: 87 mg/dL    Calcium, Total Serum: 9.2 mg/dL    Protein Total, Serum: 6.7 g/dL    Albumin, Serum: 3.1 g/dL    Bilirubin Total, Serum: 0.3 mg/dL    Alkaline Phosphatase, Serum: 57 U/L    Aspartate Aminotransferase (AST/SGOT): 20 U/L    Alanine Aminotransferase (ALT/SGPT): 9 U/L

## 2018-09-23 NOTE — DISCHARGE NOTE ADULT - CARE PROVIDERS DIRECT ADDRESSES
,[email or web address]_Unknown,[email or web address]

## 2018-09-23 NOTE — DISCHARGE NOTE ADULT - PATIENT PORTAL LINK FT
You can access the [email or web address] system Patient Portal, offered by [facility], by registering with the following website: [email or web address]

## 2018-09-23 NOTE — DISCHARGE NOTE ADULT - CARE PROVIDER_API CALL
Herlinda Delacruz), Hematology; Medical Oncology  [address]  [address]  [place] [address]  Phone: [phone]  Fax: [phone]    Arthur Flores), Radiation Oncology  [address] [place] [address]  Phone: [phone]  Fax: [phone]

## 2018-09-23 NOTE — DISCHARGE NOTE ADULT - CARE PLAN
Principal Discharge DX:	Head and neck cancer  Goal:	Chemotherapy and radiation treatment  Assessment and plan of treatment:	follow up with Dr. Delacruz

## 2018-10-25 ENCOUNTER — APPOINTMENT (OUTPATIENT)
Dept: RADIATION ONCOLOGY | Facility: CLINIC | Age: 73
End: 2018-10-25

## 2018-10-25 PROCEDURE — 85610 PROTHROMBIN TIME: CPT

## 2018-10-25 PROCEDURE — 80048 BASIC METABOLIC PNL TOTAL CA: CPT

## 2018-10-25 PROCEDURE — 85027 COMPLETE CBC AUTOMATED: CPT

## 2018-10-25 PROCEDURE — 84100 ASSAY OF PHOSPHORUS: CPT

## 2018-10-25 PROCEDURE — 36415 COLL VENOUS BLD VENIPUNCTURE: CPT

## 2018-10-25 PROCEDURE — 83735 ASSAY OF MAGNESIUM: CPT

## 2018-10-25 PROCEDURE — 80053 COMPREHEN METABOLIC PANEL: CPT

## 2018-11-12 ENCOUNTER — INPATIENT (INPATIENT)
Facility: HOSPITAL | Age: 73
LOS: 3 days | Discharge: HOME | DRG: 392 | End: 2018-11-16
Attending: HOSPITALIST | Admitting: HOSPITALIST
Payer: COMMERCIAL

## 2018-11-12 VITALS
WEIGHT: 145.06 LBS | HEART RATE: 84 BPM | TEMPERATURE: 98 F | HEIGHT: 65 IN | DIASTOLIC BLOOD PRESSURE: 64 MMHG | SYSTOLIC BLOOD PRESSURE: 102 MMHG | RESPIRATION RATE: 17 BRPM

## 2018-11-12 DIAGNOSIS — Z85.89 PERSONAL HISTORY OF MALIGNANT NEOPLASM OF OTHER ORGANS AND SYSTEMS: Chronic | ICD-10-CM

## 2018-11-12 LAB
ALBUMIN SERPL ELPH-MCNC: 3.7 G/DL — SIGNIFICANT CHANGE UP (ref 3.3–5.2)
ALP SERPL-CCNC: 66 U/L — SIGNIFICANT CHANGE UP (ref 40–120)
ALT FLD-CCNC: 8 U/L — SIGNIFICANT CHANGE UP
ANION GAP SERPL CALC-SCNC: 13 MMOL/L — SIGNIFICANT CHANGE UP (ref 5–17)
APTT BLD: 30 SEC — SIGNIFICANT CHANGE UP (ref 27.5–36.3)
AST SERPL-CCNC: 24 U/L — SIGNIFICANT CHANGE UP
BASOPHILS # BLD AUTO: 0 K/UL — SIGNIFICANT CHANGE UP (ref 0–0.2)
BASOPHILS NFR BLD AUTO: 0.5 % — SIGNIFICANT CHANGE UP (ref 0–2)
BILIRUB SERPL-MCNC: 0.4 MG/DL — SIGNIFICANT CHANGE UP (ref 0.4–2)
BUN SERPL-MCNC: 31 MG/DL — HIGH (ref 8–20)
CALCIUM SERPL-MCNC: 10.1 MG/DL — SIGNIFICANT CHANGE UP (ref 8.6–10.2)
CHLORIDE SERPL-SCNC: 97 MMOL/L — LOW (ref 98–107)
CO2 SERPL-SCNC: 30 MMOL/L — HIGH (ref 22–29)
CREAT SERPL-MCNC: 0.84 MG/DL — SIGNIFICANT CHANGE UP (ref 0.5–1.3)
EOSINOPHIL # BLD AUTO: 0 K/UL — SIGNIFICANT CHANGE UP (ref 0–0.5)
EOSINOPHIL NFR BLD AUTO: 0.5 % — SIGNIFICANT CHANGE UP (ref 0–5)
GLUCOSE SERPL-MCNC: 77 MG/DL — SIGNIFICANT CHANGE UP (ref 70–115)
HCT VFR BLD CALC: 32.9 % — LOW (ref 42–52)
HGB BLD-MCNC: 10.4 G/DL — LOW (ref 14–18)
INR BLD: 1.13 RATIO — SIGNIFICANT CHANGE UP (ref 0.88–1.16)
LYMPHOCYTES # BLD AUTO: 0.7 K/UL — LOW (ref 1–4.8)
LYMPHOCYTES # BLD AUTO: 11.6 % — LOW (ref 20–55)
MCHC RBC-ENTMCNC: 28.8 PG — SIGNIFICANT CHANGE UP (ref 27–31)
MCHC RBC-ENTMCNC: 31.6 G/DL — LOW (ref 32–36)
MCV RBC AUTO: 91.1 FL — SIGNIFICANT CHANGE UP (ref 80–94)
MONOCYTES # BLD AUTO: 0.9 K/UL — HIGH (ref 0–0.8)
MONOCYTES NFR BLD AUTO: 14.3 % — HIGH (ref 3–10)
NEUTROPHILS # BLD AUTO: 4.6 K/UL — SIGNIFICANT CHANGE UP (ref 1.8–8)
NEUTROPHILS NFR BLD AUTO: 72.6 % — SIGNIFICANT CHANGE UP (ref 37–73)
PLATELET # BLD AUTO: 322 K/UL — SIGNIFICANT CHANGE UP (ref 150–400)
POTASSIUM SERPL-MCNC: 4.9 MMOL/L — SIGNIFICANT CHANGE UP (ref 3.5–5.3)
POTASSIUM SERPL-SCNC: 4.9 MMOL/L — SIGNIFICANT CHANGE UP (ref 3.5–5.3)
PROT SERPL-MCNC: 8 G/DL — SIGNIFICANT CHANGE UP (ref 6.6–8.7)
PROTHROM AB SERPL-ACNC: 13.1 SEC — HIGH (ref 10–12.9)
RBC # BLD: 3.61 M/UL — LOW (ref 4.6–6.2)
RBC # FLD: 17.9 % — HIGH (ref 11–15.6)
SODIUM SERPL-SCNC: 140 MMOL/L — SIGNIFICANT CHANGE UP (ref 135–145)
WBC # BLD: 6.3 K/UL — SIGNIFICANT CHANGE UP (ref 4.8–10.8)
WBC # FLD AUTO: 6.3 K/UL — SIGNIFICANT CHANGE UP (ref 4.8–10.8)

## 2018-11-12 PROCEDURE — 99284 EMERGENCY DEPT VISIT MOD MDM: CPT

## 2018-11-12 PROCEDURE — 71260 CT THORAX DX C+: CPT | Mod: 26

## 2018-11-12 PROCEDURE — 74177 CT ABD & PELVIS W/CONTRAST: CPT | Mod: 26

## 2018-11-12 PROCEDURE — 99223 1ST HOSP IP/OBS HIGH 75: CPT

## 2018-11-12 PROCEDURE — 70491 CT SOFT TISSUE NECK W/DYE: CPT | Mod: 26

## 2018-11-12 RX ORDER — MORPHINE SULFATE 50 MG/1
2 CAPSULE, EXTENDED RELEASE ORAL EVERY 4 HOURS
Qty: 0 | Refills: 0 | Status: DISCONTINUED | OUTPATIENT
Start: 2018-11-12 | End: 2018-11-16

## 2018-11-12 RX ORDER — ENOXAPARIN SODIUM 100 MG/ML
40 INJECTION SUBCUTANEOUS EVERY 24 HOURS
Qty: 0 | Refills: 0 | Status: DISCONTINUED | OUTPATIENT
Start: 2018-11-12 | End: 2018-11-16

## 2018-11-12 RX ORDER — LEVOTHYROXINE SODIUM 125 MCG
12.5 TABLET ORAL AT BEDTIME
Qty: 0 | Refills: 0 | Status: DISCONTINUED | OUTPATIENT
Start: 2018-11-12 | End: 2018-11-16

## 2018-11-12 RX ORDER — PANTOPRAZOLE SODIUM 20 MG/1
40 TABLET, DELAYED RELEASE ORAL DAILY
Qty: 0 | Refills: 0 | Status: DISCONTINUED | OUTPATIENT
Start: 2018-11-12 | End: 2018-11-16

## 2018-11-12 RX ORDER — ONDANSETRON 8 MG/1
4 TABLET, FILM COATED ORAL EVERY 6 HOURS
Qty: 0 | Refills: 0 | Status: DISCONTINUED | OUTPATIENT
Start: 2018-11-12 | End: 2018-11-16

## 2018-11-12 RX ORDER — SODIUM CHLORIDE 9 MG/ML
1000 INJECTION, SOLUTION INTRAVENOUS
Qty: 0 | Refills: 0 | Status: DISCONTINUED | OUTPATIENT
Start: 2018-11-12 | End: 2018-11-16

## 2018-11-12 RX ORDER — SODIUM CHLORIDE 9 MG/ML
1000 INJECTION INTRAMUSCULAR; INTRAVENOUS; SUBCUTANEOUS ONCE
Qty: 0 | Refills: 0 | Status: COMPLETED | OUTPATIENT
Start: 2018-11-12 | End: 2018-11-12

## 2018-11-12 RX ADMIN — SODIUM CHLORIDE 2000 MILLILITER(S): 9 INJECTION INTRAMUSCULAR; INTRAVENOUS; SUBCUTANEOUS at 17:50

## 2018-11-12 NOTE — H&P ADULT - ASSESSMENT
72 y/o male with progression of underlying esophageal cancer with new stricture and lung mets, Dehydration, HTN, hypothyroid, remote nead/neck cancer

## 2018-11-12 NOTE — H&P ADULT - PROBLEM SELECTOR PLAN 1
Cont. NPO with CT findings, anti-emetics, IVF, pain control, IV PPI, GI eval, OOB, DVT-P. Oncology f/u with above findings representing progression of disease

## 2018-11-12 NOTE — H&P ADULT - PMH
Head and neck cancer  2007  High cholesterol    HTN (hypertension)    Hypothyroid    Malignant neoplasm of esophagus, unspecified location

## 2018-11-12 NOTE — ED PROVIDER NOTE - PROGRESS NOTE DETAILS
Pt. well appearing. Pt. will be admitted for esophageal stricture and worsening esophageal neoplasm and failure to thrive. Case discussed with Dr. Souza for admission.

## 2018-11-12 NOTE — ED ADULT NURSE NOTE - OBJECTIVE STATEMENT
pt c/o abdominal pain starting two days ago, per pt he has trouble swallowing, per pt pain is always there.  per patient pain was worse today. pt complains of vomiting after eating.

## 2018-11-12 NOTE — H&P ADULT - HISTORY OF PRESENT ILLNESS
72 y/o male with history of head and neck cancer s/p left sided facial surgery for tumor resection in 07, Esophageal cancer with esophageal stricture s/p esophageal stent. His last chemo therapy treatment was 1 month ago by Dr. Delacruz. He states he follows with Dr. Herring for his stent. He reports that 2 days ago noticed pain in his chest with swallowing liquids and solids and regurgitation while trying to eat. He denies any bleeding, fever, chills, SOB. Symptoms did not resolved, so he came to ED. In ED had CT to evaluate stent which showed narrowing of esophagus superiorly to the stent as well as evidence of progression of disease with lung mets. No other acute findings. Patient with clinical and lab evidence of dehydration.

## 2018-11-12 NOTE — ED ADULT NURSE NOTE - NSIMPLEMENTINTERV_GEN_ALL_ED
Implemented All Universal Safety Interventions:  [place] to call system. Call bell, personal items and telephone within reach. Instruct patient to call for assistance. Room bathroom lighting operational. Non-slip footwear when patient is off stretcher. Physically safe environment: no spills, clutter or unnecessary equipment. Stretcher in lowest position, wheels locked, appropriate side rails in place.

## 2018-11-13 ENCOUNTER — TRANSCRIPTION ENCOUNTER (OUTPATIENT)
Age: 73
End: 2018-11-13

## 2018-11-13 DIAGNOSIS — I10 ESSENTIAL (PRIMARY) HYPERTENSION: ICD-10-CM

## 2018-11-13 DIAGNOSIS — Z51.5 ENCOUNTER FOR PALLIATIVE CARE: ICD-10-CM

## 2018-11-13 DIAGNOSIS — K22.2 ESOPHAGEAL OBSTRUCTION: ICD-10-CM

## 2018-11-13 DIAGNOSIS — E03.9 HYPOTHYROIDISM, UNSPECIFIED: ICD-10-CM

## 2018-11-13 DIAGNOSIS — R64 CACHEXIA: ICD-10-CM

## 2018-11-13 DIAGNOSIS — R13.10 DYSPHAGIA, UNSPECIFIED: ICD-10-CM

## 2018-11-13 DIAGNOSIS — C15.9 MALIGNANT NEOPLASM OF ESOPHAGUS, UNSPECIFIED: ICD-10-CM

## 2018-11-13 DIAGNOSIS — E46 UNSPECIFIED PROTEIN-CALORIE MALNUTRITION: ICD-10-CM

## 2018-11-13 PROCEDURE — 99223 1ST HOSP IP/OBS HIGH 75: CPT

## 2018-11-13 PROCEDURE — 99233 SBSQ HOSP IP/OBS HIGH 50: CPT

## 2018-11-13 RX ADMIN — Medication 12.5 MICROGRAM(S): at 21:11

## 2018-11-13 RX ADMIN — SODIUM CHLORIDE 125 MILLILITER(S): 9 INJECTION, SOLUTION INTRAVENOUS at 11:07

## 2018-11-13 RX ADMIN — ENOXAPARIN SODIUM 40 MILLIGRAM(S): 100 INJECTION SUBCUTANEOUS at 05:14

## 2018-11-13 RX ADMIN — SODIUM CHLORIDE 125 MILLILITER(S): 9 INJECTION, SOLUTION INTRAVENOUS at 01:07

## 2018-11-13 RX ADMIN — PANTOPRAZOLE SODIUM 40 MILLIGRAM(S): 20 TABLET, DELAYED RELEASE ORAL at 11:07

## 2018-11-13 NOTE — CONSULT NOTE ADULT - SUBJECTIVE AND OBJECTIVE BOX
HPI:  74 y/o male with history of head and neck cancer s/p left sided facial surgery for tumor resection in 07, Esophageal cancer with esophageal stricture s/p esophageal stent.  The patient received concurrent chemo/XRT with cisplatin/5 flurouracil. he was recently noted to have lung nodules and a biopsy was being scheduled. The patient now presents with two days of dyphagia to solids and liquids. He denies hematemesis or melena. he denies pain. He underwent a CT which demonstrated narrowing of esophagus superiorly to the stent as well as evidence of progression of disease with lung mets. No other acute findings. He is scheduled to undergo an egd tomorrow    PAST MEDICAL & SURGICAL HISTORY:  Malignant neoplasm of esophagus, unspecified location  High cholesterol  Hypothyroid  HTN (hypertension)  Head and neck cancer: 2007  History of head and neck cancer    FAMILY HISTORY:  Family history of stomach cancer (Sibling)    former smoker    MEDICATIONS  (STANDING):  dextrose 5% + sodium chloride 0.9%. 1000 milliLiter(s) (125 mL/Hr) IV Continuous <Continuous>  enoxaparin Injectable 40 milliGRAM(s) SubCutaneous every 24 hours  levothyroxine Injectable 12.5 MICROGram(s) IV Push at bedtime  pantoprazole  Injectable 40 milliGRAM(s) IV Push daily    Vital Signs Last 24 Hrs  T(C): 36.7 (13 Nov 2018 10:56), Max: 37.3 (12 Nov 2018 20:56)  T(F): 98 (13 Nov 2018 10:56), Max: 99.1 (12 Nov 2018 20:56)  HR: 53 (13 Nov 2018 10:56) (53 - 84)  BP: 138/65 (13 Nov 2018 10:56) (102/64 - 138/65)  BP(mean): 95 (12 Nov 2018 23:41) (95 - 95)  RR: 18 (13 Nov 2018 10:56) (16 - 18)  SpO2: 98% (13 Nov 2018 10:56) (98% - 100%)  CONSTITUTIONAL: The patient appears cachectic and is in no apparent distress  EYES: EOMI, no scleral icterus, conjunctiva clear,  EARS, NOSE, MOUTH, THROAT:  changes from previous head and neck surgery  NECK: no thyromegaly, no cervical lymphadenopathy appreciated  PULMONARY: Clear to auscultation bilaterally, no wheezing or rhonchi, no use of accessory muscles  CARDIOVASCULAR: normal sinus rhythm, no murmurs, rubs or gallops, no peripheral edema  GASTROINTESTINAL: soft, nontender, nondistended, normoactive bowel sounds, no hepatomegaly or splenomegaly is appreciated  MUSCULOSKELETAL: no spinal tenderness to palpation. no joint swelling.   EXTREMITIES: No digital cyanosis or clubbing  LYMPH NODES: no cervical, no supraclavicular, axillary or inguinal lymphadenopathy  SKIN: no rashes, lesions, ulcers or bruising  PSYCH: alert and oriented x 3, appropriate affect                10.4                 140  | 30.0 | 31.0         6.3   >-----------< 322     ------------------------< 77                    32.9                 4.9  | 97   | 0.84                                         Ca 10.1  Mg x     Ph x

## 2018-11-13 NOTE — CONSULT NOTE ADULT - SUBJECTIVE AND OBJECTIVE BOX
Palliative Medicine Initial Consultation Note    HPI:  74 y/o male with history of head and neck cancer s/p left sided facial surgery for tumor resection in 07, Esophageal cancer with esophageal stricture s/p esophageal stent. His last chemo therapy treatment was 1 month ago by Dr. Delacruz.  Patient with esophageal stent. Reports recent difficulty swallowing.  No fevers, chills or abdominal pain. No nausea or vomiting.       PERTINENT PMH REVIEWED:  [x ] YES [ ] NO         Head and neck cancer  2007  High cholesterol    HTN (hypertension)    Hypothyroid    Malignant neoplasm of esophagus, unspecified location.     Past Surgical History:  History of head and neck cancer.    SOCIAL HISTORY:  EtOH [ ] Yes  [ ]x No                                    Drugs [ ] Yes [x ] No                                   [x ]former  smoker [ ] nonsmoker                                    Admitted from: [ x] home [ ] SNF _________ [ ] [facility] ________    Surrogate/HCP/Guardian: No HCP in chart.  reports friend Brice Montalvo surrogate  FAMILY HISTORY:  Family history of stomach cancer (Sibling)      Baseline ADLs (prior to admission):  Independent [ x] moderately [ ] fully   Dependent   [ ] moderately [ ]fully    Not , NO children.  Siblings in [place].     MEDICATIONS  (STANDING):  dextrose 5% + sodium chloride 0.9%. 1000 milliLiter(s) (125 mL/Hr) IV Continuous <Continuous>  enoxaparin Injectable 40 milliGRAM(s) SubCutaneous every 24 hours  levothyroxine Injectable 12.5 MICROGram(s) IV Push at bedtime  pantoprazole  Injectable 40 milliGRAM(s) IV Push daily    MEDICATIONS  (PRN):  morphine  - Injectable 2 milliGRAM(s) IV Push every 4 hours PRN Moderate Pain (4 - 6)  ondansetron Injectable 4 milliGRAM(s) IV Push every 6 hours PRN Nausea      Allergies    No Known Allergies    Intolerances        REVIEW OF SYSTEMS   see HPI    [ ] Unable to obtain due to poor mentation     General: no fevers, +  fatigue, + loss of appetite    Skin: no rashes, skin changes  	  Ophthalmologic: no eye pain or blurred vision  	  ENMT:	no sore throat, no ear pain    Respiratory and Thorax: no cough,  no  SOB 	    Cardiovascular:	no chest pain, no leg swelling    Gastrointestinal:	no  n/v/d,c  +dysphagia    Genitourinary:	no FUD    Musculoskeletal: no muscle pain	    Neurological: no seizures, no dizziness    Hematology/Lymphatics: no petechia or purpura	    Endocrine: no polyuria, no polydipsia	      Karnofsky Performance Score/Palliative Performance Status Version 2:  30 %    Vital Signs Last 24 Hrs  T(C): 36.7 (13 Nov 2018 10:56), Max: 37.3 (12 Nov 2018 20:56)  T(F): 98 (13 Nov 2018 10:56), Max: 99.1 (12 Nov 2018 20:56)  HR: 53 (13 Nov 2018 10:56) (53 - 84)  BP: 138/65 (13 Nov 2018 10:56) (102/64 - 138/65)  BP(mean): 95 (12 Nov 2018 23:41) (95 - 95)  RR: 18 (13 Nov 2018 10:56) (16 - 18)  SpO2: 98% (13 Nov 2018 10:56) (98% - 100%)    PHYSICAL EXAM:    General: Cachetic man, NAD    HEENT: disfigured   Lungs: [ x] comfortable [ ] tachypnea/labored breathing  [ ] excessive secretions    CV: [x ] normal  [ ] tachycardia    GI: [x ] normal  [ ] distended  [ ] tender  [ ] no BS               [ ] PEG/NG/OG tube    : [x ] normal  [ ] incontinent  [ ] oliguria/anuria  [ ] ugarte    MSK: [ ] normal  [ x] weakness  [ ] edema             [ ] ambulatory  [x ] bedbound/wheelchair bound    Skin: [ ] normal  [ ] pressure ulcers- Stage_____  [x ] no rash    LABS:                        10.4   6.3   )-----------( 322      ( 12 Nov 2018 18:24 )             32.9     11-12    140  |  97<L>  |  31.0<H>  ----------------------------<  77  4.9   |  30.0<H>  |  0.84    Ca    10.1      12 Nov 2018 18:24    TPro  8.0  /  Alb  3.7  /  TBili  0.4  /  DBili  x   /  AST  24  /  ALT  8   /  AlkPhos  66  11-12    PT/INR - ( 12 Nov 2018 18:24 )   PT: 13.1 sec;   INR: 1.13 ratio         PTT - ( 12 Nov 2018 18:24 )  PTT:30.0 sec    I&O's Summary      RADIOLOGY & ADDITIONAL STUDIES:  < from: CT Abdomen and Pelvis w/ IV Cont (11.12.18 @ 20:25) >  Hepatobiliary: The liver, gallbladder, and biliary tree are unremarkable  Spleen: Unremarkable  Pancreas: Unremarkable  Adrenal glands: Unremarkable  Urinary: The kidneys, ureters, and urinary bladder are unremarkable  Reproductive: The prostate and seminal vesicles are unremarkable.  Gastrointestinal: The stomach, small bowel, large bowel, and appendix are   unremarkable.  Peritoneum: Unremarkable  Vasculature: Unremarkable  Retroperitoneum: Gastrohepatic lymphadenopathy is unchanged.  Abdominal wall: Unremarkable  Neuromusculoskeletal: Degenerative disc disease    Impression:   -Progression of metastatic disease including new pulmonary metastatic   nodules detailed above, some of which are cavitary.  -Narrowing of the esophagus just superior to the stent, may be focal   stricture versus transient peristalsis.    < end of copied text >    ADVANCE DIRECTIVES:  [ ] YES [ x] NO   DNR [ ] YES [x ] NO  Completed on:                     MOLST  [ ] YES [x ] NO   Completed on:  Living Will  [ ] YES [x ] NO   Completed on:    Thank you for the opportunity to assist with the care of this patient.   [facility] [phone].

## 2018-11-13 NOTE — PROGRESS NOTE ADULT - PROBLEM SELECTOR PLAN 1
Cont. NPO, anti-emetics, IVF, pain control, IV PPI, GI eval appreciated.  OOB, DVT-P. Oncology f/u with above findings representing progression of disease. [  ]

## 2018-11-13 NOTE — CONSULT NOTE ADULT - ASSESSMENT
73 yr male with  esophageal cancer with stent  new stricture now with  lung mets admitted with dysphagia concerning with progression of disease.

## 2018-11-13 NOTE — ED ADULT NURSE REASSESSMENT NOTE - NS ED NURSE REASSESS COMMENT FT1
pt c/o hunger but states he is unable to eat.  Notified Dr. Barrera who came to bedside to notify patient of admission.
report given to 6T RN. pt vss. awaiting transport. will continue to monitor.
Report received from offgoing RN, charting as noted. Patient is A&Ox4, denies any pain or discomfort.  assisted with interview. Patient denies pain or discomfort, chest pain or respiratory difficulty.  Pt c/o dysphagia, states he hasn't been able to eat anything.  Awaiting radiology results. will continue to monitor.

## 2018-11-13 NOTE — PROGRESS NOTE ADULT - SUBJECTIVE AND OBJECTIVE BOX
Dr. [facility] Hospitalist Progress Note  THO TURNER [id number]    Patient is a 73y old  Male who presents with a chief complaint of Unable to tolerate PO (13 Nov 2018 07:51)    HPI:  74 y/o male with history of head and neck cancer s/p left sided facial surgery for tumor resection in 07, Esophageal cancer with esophageal stricture s/p esophageal stent. His last chemo therapy treatment was 1 month ago by Dr. Delacruz. He states he follows with Dr. Herring for his stent. He reports that 2 days ago noticed pain in his chest with swallowing liquids and solids and regurgitation while trying to eat. He denies any bleeding, fever, chills, SOB. Symptoms did not resolved, so he came to ED. In ED had CT to evaluate stent which showed narrowing of esophagus superiorly to the stent as well as evidence of progression of disease with lung mets. No other acute findings. Patient with clinical and lab evidence of dehydration. (12 Nov 2018 23:41), a/w acute dysphagia CT showed advanced esophageal ca with debris over stent. pulmonary nodules with cavitary lesion, for EGD tomorrow. currently NPO.    INterval: seen at bedside. dniec omplaints except inability to eat and gen weakness.       ROS:  CONSTITUTIONAL:  No distress.no fever/chills. + fatigue/weight loss  HEENT:  Eyes:  No diplopia or blurred vision.   CARDIOVASCULAR:  No pressure, squeezing, tightness, heaviness or aching about the chest; no palpitations.no leg swelling, no orthopnea or PND  RESPIRATORY:  no SOB. no wheezing.no cough or sputum.  No hemoptysis  GI: no nausea, no vomiting, no diarrhea, no constipation. No hematochezia or melena  EXT:No joint pain or joint swelling or redness. no leg or calf pain  SKIN: no skin break or ulcer.  CNS: No headaches. No weakness. no numbness. No depression or anxiety. No SI    T(C): 36.8 (11-13-18 @ 07:36), Max: 37.3 (11-12-18 @ 20:56)  HR: 60 (11-13-18 @ 07:36) (60 - 84)  BP: 121/66 (11-13-18 @ 07:36) (102/64 - 134/76)  RR: 18 (11-13-18 @ 07:36) (16 - 18)  SpO2: 98% (11-13-18 @ 07:36) (98% - 100%)  CAPILLARY BLOOD GLUCOSE          Physical Exam:  GENERAL: Not in distress. Alert  cahcectic  HEENT:  Normocephalic and atraumatic. PEARLA,EOMI  NECK: Supple.  No JVD.    CARDIOVASCULAR: RRR S1, S2. No murmur/rubs/gallop  LUNGS: BLAE+, no rales, no wheezing, no rhonchi.    ABDOMEN: ND. Soft,  NT, no guarding / rebound / rigidity. BS normoactive. No CVA tenderness.    BACK: No spine tenderness.  EXTREMITIES: no cyanosis, no clubbing, no edema.   SKIN: no rash. No skin break or ulcer. No cellulitis. exposed skin  NEUROLOGIC: AAO*3.strength is symmetric, sensation intact, speech fluent.    PSYCHIATRIC: Calm.  No agitation.    Labs                        10.4   6.3   )-----------( 322      ( 12 Nov 2018 18:24 )             32.9     11-12    140  |  97<L>  |  31.0<H>  ----------------------------<  77  4.9   |  30.0<H>  |  0.84    Ca    10.1      12 Nov 2018 18:24    TPro  8.0  /  Alb  3.7  /  TBili  0.4  /  DBili  x   /  AST  24  /  ALT  8   /  AlkPhos  66  11-12   PT/INR - ( 12 Nov 2018 18:24 )   PT: 13.1 sec;   INR: 1.13 ratio         PTT - ( 12 Nov 2018 18:24 )  PTT:30.0 sec  MEDICATIONS  (STANDING):  dextrose 5% + sodium chloride 0.9%. 1000 milliLiter(s) (125 mL/Hr) IV Continuous <Continuous>  enoxaparin Injectable 40 milliGRAM(s) SubCutaneous every 24 hours  levothyroxine Injectable 12.5 MICROGram(s) IV Push at bedtime  pantoprazole  Injectable 40 milliGRAM(s) IV Push daily    MEDICATIONS  (PRN):  morphine  - Injectable 2 milliGRAM(s) IV Push every 4 hours PRN Moderate Pain (4 - 6)  ondansetron Injectable 4 milliGRAM(s) IV Push every 6 hours PRN Nausea

## 2018-11-13 NOTE — CONSULT NOTE ADULT - ASSESSMENT
A/P : esophageal cancer with radiographic evidence of progression. An egd is planned for tomorrow. The need for a lung biopsy will depend. Consider molecular testing for tumor mutational burden to see if he would be eligible for immunotherapy. may need a PEG

## 2018-11-13 NOTE — CONSULT NOTE ADULT - SUBJECTIVE AND OBJECTIVE BOX
HISTORY OF PRESENT ILLNESS: This is a 73-year-old man with a past medical history significant for head and neck cancer, esophageal cancer, hypothyroidism, HTN, and HLD who presented to the ED yesterday evening with a complaint of dysphagia for the past two days.  He reports that this problem presented suddenly with an inability to swallow solids or liquids without immediate regurgitation, though he is able to swallow his own secretions.  He last received chemotherapy about one month ago.  This past June 25th, he underwent an esophageal stent placement by Dr. Lopez here at [facility].  He reports some mild associated epigastric abdominal pain.  Apart from feeling weak and being unable to eat, he voices no other complaints.  He was recently admitted for failure to thrive.  While in the ED, he underwent a CT neck, chest, abdomen and pelvis that showed debris in the existing esophageal stent with proximal narrowing.  Additionally noted were enlarging pulmonary nodules, all concerning worsening metastatic disease.    REVIEW OF SYSTEMS: All other review of systems were completed and were otherwise negative save what is reported in the HPI.    PAST MEDICAL/SURGICAL HISTORY:  Malignant neoplasm of esophagus, unspecified location  High cholesterol  Hypothyroid  HTN (hypertension)  Head and neck cancer: 2007  History of head and neck cancer    SOCIAL HISTORY:  - TOBACCO: Former smoker  - ALCOHOL: Denies  - ILLICIT DRUG USE: Denies    FAMILY HISTORY:  Family history of stomach cancer (Sibling)    HOME MEDICATIONS:  amLODIPine 5 mg oral tablet: 1 tab(s) orally once a day (23 Sep 2018 14:50)  aspirin 81 mg oral delayed release tablet: 1 tab(s) orally once a day (26 Aug 2018 09:25)  ferrous sulfate 325 mg (65 mg elemental iron) oral tablet: 1 tab(s) orally 2 times a day (31 Jul 2018 11:03)  levothyroxine 25 mcg (0.025 mg) oral tablet: 1 tab(s) orally once a day (26 Aug 2018 09:25)  pantoprazole 40 mg oral delayed release tablet: 1 tab(s) orally 2 times a day (26 Aug 2018 09:25)  simvastatin 10 mg oral tablet: 1 tab(s) orally once a day (at bedtime) (23 Sep 2018 14:45)    INPATIENT MEDICATIONS:  MEDICATIONS  (STANDING):  dextrose 5% + sodium chloride 0.9%. 1000 milliLiter(s) (125 mL/Hr) IV Continuous <Continuous>  enoxaparin Injectable 40 milliGRAM(s) SubCutaneous every 24 hours  levothyroxine Injectable 12.5 MICROGram(s) IV Push at bedtime  pantoprazole  Injectable 40 milliGRAM(s) IV Push daily    MEDICATIONS  (PRN):  morphine  - Injectable 2 milliGRAM(s) IV Push every 4 hours PRN Moderate Pain (4 - 6)  ondansetron Injectable 4 milliGRAM(s) IV Push every 6 hours PRN Nausea    ALLERGIES:  No Known Allergies    VITAL SIGNS LAST 24 HOURS:  T(C): 36.8 (13 Nov 2018 07:36), Max: 37.3 (12 Nov 2018 20:56)  T(F): 98.2 (13 Nov 2018 07:36), Max: 99.1 (12 Nov 2018 20:56)  HR: 60 (13 Nov 2018 07:36) (60 - 84)  BP: 121/66 (13 Nov 2018 07:36) (102/64 - 134/76)  BP(mean): 95 (12 Nov 2018 23:41) (95 - 95)  RR: 18 (13 Nov 2018 07:36) (16 - 18)  SpO2: 98% (13 Nov 2018 07:36) (98% - 100%)    PHYSICAL EXAM:  Constitutional: Severe cachexia   Eyes: Sclerae anicteric, conjunctivae normal  ENMT: Edentulous   Neck: No thyroid nodules appreciated, no significant cervical or supraclavicular lymphadenopathy  Respiratory: Breathing nonlabored; clear to auscultation  Cardiovascular: Regular rate and rhythm, holosystolic murmur  Gastrointestinal: Soft, scaphoid abdomen, nontender, nondistended, normoactive bowel sounds; no hepatosplenomegaly appreciated; no rebound tenderness or involuntary guarding  Extremities: No clubbing, cyanosis or edema  Neurological: Alert and oriented to person, place and time; no asterixis  Skin: No jaundice  Lymph Nodes: No significant lymphadenopathy  Musculoskeletal: Diffuse peripheral atrophy  Psychiatric: Affect and mood appropriate      LABS:                        10.4   6.3   )-----------( 322      ( 12 Nov 2018 18:24 )             32.9     PT/INR - ( 12 Nov 2018 18:24 )   PT: 13.1 sec;   INR: 1.13 ratio         PTT - ( 12 Nov 2018 18:24 )  PTT:30.0 sec  11-12    140  |  97<L>  |  31.0<H>  ----------------------------<  77  4.9   |  30.0<H>  |  0.84    Ca    10.1      12 Nov 2018 18:24    TPro  8.0  /  Alb  3.7  /  TBili  0.4  /  DBili  x   /  AST  24  /  ALT  8   /  AlkPhos  66  11-12    LIVER FUNCTIONS - ( 12 Nov 2018 18:24 )  Alb: 3.7 g/dL / Pro: 8.0 g/dL / ALK PHOS: 66 U/L / ALT: 8 U/L / AST: 24 U/L / GGT: x           IMAGING:  I personally reviewed the CT, and I agree with the radiologist's interpretation.

## 2018-11-13 NOTE — CONSULT NOTE ADULT - PROBLEM SELECTOR RECOMMENDATION 5
Met with patient. Reports no pain.   Discussed health care proxy. He names his friend Brice whom he lives with as his surrogate. Plan to complete HCP form.  Await oncology input.  Possible endoscopy  continue support.

## 2018-11-13 NOTE — CONSULT NOTE ADULT - ASSESSMENT
In short, this is a 73-year-old man with advancing metastatic esophageal cancer who presented to the ED with acute onset of dysphagia for the past two days.  The etiology of his dysphagia is not clearly defined by the CT.  There is debris in the stent, which may be causing the obstruction or there may be now advancing proximal disease causing obstruction superior to the stent.  Patient will likely need an upper endoscopy to evaluate his esophagus.  If it is simply an obstruction secondary to debris, this can likely be alleviated endoscopically.  If this is worsening and proximally advancing disease, further stenting may be futile.  Either way, strong consideration of hospice should be given.    - Continue to keep NPO  - Antiemetics as you are  - Possible upper endoscopy tomorrow    Thank you for the consult.  Please do not hesitate to call with any questions or concerns.    LIDIA Davis MD  [facility]  Division of Gastroenterology  Tel [phone]  Fax [phone]  11-13-18 @ 08:08 In short, this is a 73-year-old man with advancing metastatic esophageal cancer who presented to the ED with acute onset of dysphagia for the past two days.  The etiology of his dysphagia is not clearly defined by the CT.  There is debris in the stent, which may be causing the obstruction or there may be now advancing proximal disease causing obstruction superior to the stent.  Patient will likely need an upper endoscopy to evaluate his esophagus.  If it is simply an obstruction secondary to debris, this can likely be alleviated endoscopically.  If this is worsening and proximally advancing disease, further stenting may be futile.  Either way, strong consideration of hospice should be given.    - Continue to keep NPO  - Antiemetics as you are  - upper endoscopy tomorrow    Thank you for the consult.  Please do not hesitate to call with any questions or concerns.    LIDIA Davis MD  [facility]  Division of Gastroenterology  Tel [phone]  Fax [phone]  11-13-18 @ 08:08

## 2018-11-14 DIAGNOSIS — R91.8 OTHER NONSPECIFIC ABNORMAL FINDING OF LUNG FIELD: ICD-10-CM

## 2018-11-14 LAB
ANION GAP SERPL CALC-SCNC: 9 MMOL/L — SIGNIFICANT CHANGE UP (ref 5–17)
BUN SERPL-MCNC: 15 MG/DL — SIGNIFICANT CHANGE UP (ref 8–20)
CALCIUM SERPL-MCNC: 8.7 MG/DL — SIGNIFICANT CHANGE UP (ref 8.6–10.2)
CHLORIDE SERPL-SCNC: 102 MMOL/L — SIGNIFICANT CHANGE UP (ref 98–107)
CO2 SERPL-SCNC: 28 MMOL/L — SIGNIFICANT CHANGE UP (ref 22–29)
CREAT SERPL-MCNC: 0.47 MG/DL — LOW (ref 0.5–1.3)
GLUCOSE SERPL-MCNC: 125 MG/DL — HIGH (ref 70–115)
HCT VFR BLD CALC: 30.6 % — LOW (ref 42–52)
HGB BLD-MCNC: 9.6 G/DL — LOW (ref 14–18)
MAGNESIUM SERPL-MCNC: 1.7 MG/DL — SIGNIFICANT CHANGE UP (ref 1.6–2.6)
MCHC RBC-ENTMCNC: 28.7 PG — SIGNIFICANT CHANGE UP (ref 27–31)
MCHC RBC-ENTMCNC: 31.4 G/DL — LOW (ref 32–36)
MCV RBC AUTO: 91.6 FL — SIGNIFICANT CHANGE UP (ref 80–94)
PLATELET # BLD AUTO: 267 K/UL — SIGNIFICANT CHANGE UP (ref 150–400)
POTASSIUM SERPL-MCNC: 4 MMOL/L — SIGNIFICANT CHANGE UP (ref 3.5–5.3)
POTASSIUM SERPL-SCNC: 4 MMOL/L — SIGNIFICANT CHANGE UP (ref 3.5–5.3)
RBC # BLD: 3.34 M/UL — LOW (ref 4.6–6.2)
RBC # FLD: 17.5 % — HIGH (ref 11–15.6)
SODIUM SERPL-SCNC: 139 MMOL/L — SIGNIFICANT CHANGE UP (ref 135–145)
WBC # BLD: 5.5 K/UL — SIGNIFICANT CHANGE UP (ref 4.8–10.8)
WBC # FLD AUTO: 5.5 K/UL — SIGNIFICANT CHANGE UP (ref 4.8–10.8)

## 2018-11-14 PROCEDURE — 43235 EGD DIAGNOSTIC BRUSH WASH: CPT

## 2018-11-14 PROCEDURE — 93010 ELECTROCARDIOGRAM REPORT: CPT

## 2018-11-14 PROCEDURE — 99233 SBSQ HOSP IP/OBS HIGH 50: CPT

## 2018-11-14 PROCEDURE — 99232 SBSQ HOSP IP/OBS MODERATE 35: CPT

## 2018-11-14 RX ADMIN — PANTOPRAZOLE SODIUM 40 MILLIGRAM(S): 20 TABLET, DELAYED RELEASE ORAL at 17:31

## 2018-11-14 RX ADMIN — SODIUM CHLORIDE 125 MILLILITER(S): 9 INJECTION, SOLUTION INTRAVENOUS at 06:32

## 2018-11-14 RX ADMIN — SODIUM CHLORIDE 125 MILLILITER(S): 9 INJECTION, SOLUTION INTRAVENOUS at 21:18

## 2018-11-14 RX ADMIN — Medication 12.5 MICROGRAM(S): at 21:18

## 2018-11-14 NOTE — PROGRESS NOTE ADULT - SUBJECTIVE AND OBJECTIVE BOX
THO TURNER    [id number]    73y      Male    INTERVAL HPI/OVERNIGHT EVENTS:    patient being seen for metastatic esophageal cancer with stricture and med management. patient is npo for egd today. patient seen at bedside and denies any complaints      REVIEW OF SYSTEMS:    CONSTITUTIONAL: No fever, weight loss, or fatigue  RESPIRATORY: No cough, wheezing, hemoptysis; No shortness of breath  CARDIOVASCULAR: No chest pain, palpitations  GASTROINTESTINAL: No abdominal or epigastric pain. No nausea, vomiting  NEUROLOGICAL: No headaches, memory loss, loss of strength.  MISCELLANEOUS:      Vital Signs Last 24 Hrs  T(C): 36.8 (14 Nov 2018 08:01), Max: 37.3 (13 Nov 2018 22:42)  T(F): 98.2 (14 Nov 2018 08:01), Max: 99.1 (13 Nov 2018 22:42)  HR: 55 (14 Nov 2018 08:01) (53 - 57)  BP: 95/56 (14 Nov 2018 08:01) (95/56 - 146/67)  BP(mean): --  RR: 18 (14 Nov 2018 08:01) (18 - 18)  SpO2: 97% (14 Nov 2018 08:01) (97% - 99%)    PHYSICAL EXAM:    GENERAL: NAD, cachetic,   HEENT: PERRL, +EOMI  CHEST/LUNG: Clear to auscultation bilaterally; No wheezing  HEART: S1S2+, Regular rate and rhythm; No murmurs  ABDOMEN: Soft, thin   EXTREMITIES:   muscle wasting No edema  SKIN: No rashes or lesions  NEURO: AAOX3, no focal deficits,  PSYCH: normal mood      LABS:                        9.6    5.5   )-----------( 267      ( 14 Nov 2018 07:24 )             30.6     11-14    139  |  102  |  15.0  ----------------------------<  125<H>  4.0   |  28.0  |  0.47<L>    Ca    8.7      14 Nov 2018 07:24  Mg     1.7     11-14    TPro  8.0  /  Alb  3.7  /  TBili  0.4  /  DBili  x   /  AST  24  /  ALT  8   /  AlkPhos  66  11-12    PT/INR - ( 12 Nov 2018 18:24 )   PT: 13.1 sec;   INR: 1.13 ratio         PTT - ( 12 Nov 2018 18:24 )  PTT:30.0 sec        MEDICATIONS  (STANDING):  dextrose 5% + sodium chloride 0.9%. 1000 milliLiter(s) (125 mL/Hr) IV Continuous <Continuous>  enoxaparin Injectable 40 milliGRAM(s) SubCutaneous every 24 hours  levothyroxine Injectable 12.5 MICROGram(s) IV Push at bedtime  pantoprazole  Injectable 40 milliGRAM(s) IV Push daily    MEDICATIONS  (PRN):  morphine  - Injectable 2 milliGRAM(s) IV Push every 4 hours PRN Moderate Pain (4 - 6)  ondansetron Injectable 4 milliGRAM(s) IV Push every 6 hours PRN Nausea      RADIOLOGY & ADDITIONAL TESTS:

## 2018-11-14 NOTE — BRIEF OPERATIVE NOTE - COMMENTS
Significant stenosis at proximal end of esophageal stent, though the lumen was patent.  Recommend liquid and pureed diet.

## 2018-11-14 NOTE — CONSULT NOTE ADULT - PROBLEM SELECTOR RECOMMENDATION 9
Await oncology input
s/p Stent with Dr. Herring a few years ago  11/12 CT scan of neck found stenosis proximal to esophageal stent   GI, hematology and palliative following  EGD today per GI   Continue care per primary team   Thoracic surgery consulted for findings on CT chest consistent with new b/l Lung nodules with concern for metastasis  From a thoracic standpoint there is no immediate intervention to be done at this time, would recommend possible IR Bx if able    Will continue to follow  Plan discussed with Dr. Abebe

## 2018-11-14 NOTE — CONSULT NOTE ADULT - ASSESSMENT
74 y/o Hebrew speaking male, with PMH of head and neck cancer s/p left sided facial surgery for tumor resection (2007), Esophageal cancer with esophageal stricture s/p esophageal stent (with Dr. Herring) who received concurrent chemo/XRT with cisplatin/5 flurouracil with his last chemo therapy treatment being 1 month ago with Dr. Delacruz. Pt Presented to [facility] ED on 11/12 with 2 days pain in his chest with/ swallowing liquids and solids and regurgitation. CT scan for evaluation showed narrowing of esophagus superiorly to the stent as well as concern for progression of disease with possible lung metastasis.  Thoracic surgery consulted for possible biopsy, plan to be discussed with Dr. Abebe

## 2018-11-14 NOTE — BRIEF OPERATIVE NOTE - POST-OP DX
Esophageal stenosis  11/14/2018    Active  SUJIT Davis  Malignant neoplasm of esophagus, unspecified location  11/14/2018    Active  SUJIT Davis

## 2018-11-14 NOTE — PROGRESS NOTE ADULT - SUBJECTIVE AND OBJECTIVE BOX
HPI:  74 y/o male with history of head and neck cancer s/p left sided facial surgery for laryngeal tumor resection in 07, Esophageal cancer with esophageal stricture s/p esophageal stent. this  year, adenocarcinoma.   The patient received concurrent chemo/RT with Cisplatinum and 5 FU, he was recently noted to have lung nodules and a biopsy was being scheduled. The patient now presents with two days of dysphagia  to solids and liquids. He denies hematemesis or melena, he denies pain. He underwent a CT which demonstrated narrowing of esophagus superiorly to the stent as well as evidence of progression of disease with lung mets. No other acute findings. He is scheduled to undergo an EGD.  I had asked Dr. Abebe to see him for a lung Bx.    PAST MEDICAL & SURGICAL HISTORY:  Malignant neoplasm of esophagus, unspecified location  High cholesterol  Hypothyroid  HTN (hypertension)  Head and neck cancer: 2007  History of head and neck cancer    FAMILY HISTORY:  Family history of stomach cancer (Sibling)    Former smoker    MEDICATIONS  (STANDING):  dextrose 5% + sodium chloride 0.9%. 1000 milliLiter(s) (125 mL/Hr) IV Continuous <Continuous>  enoxaparin Injectable 40 milliGRAM(s) SubCutaneous every 24 hours  levothyroxine Injectable 12.5 MICROGram(s) IV Push at bedtime  pantoprazole  Injectable 40 milliGRAM(s) IV Push daily    CBC Full  -  ( 14 Nov 2018 07:24 )  WBC Count : 5.5 K/uL  Hemoglobin : 9.6 g/dL  Hematocrit : 30.6 %  Platelet Count - Automated : 267 K/uL  Mean Cell Volume : 91.6 fl  Mean Cell Hemoglobin : 28.7 pg  Mean Cell Hemoglobin Concentration : 31.4 g/dL  Auto Neutrophil # : x  Auto Lymphocyte # : x  Auto Monocyte # : x  Auto Eosinophil # : x  Auto Basophil # : x  Auto Neutrophil % : x  Auto Lymphocyte % : x  Auto Monocyte % : x  Auto Eosinophil % : x  Auto Basophil % : x    11-14    139  |  102  |  15.0  ----------------------------<  125<H>  4.0   |  28.0  |  0.47<L>    Ca    8.7      14 Nov 2018 07:24  Mg     1.7     11-14    TPro  8.0  /  Alb  3.7  /  TBili  0.4  /  DBili  x   /  AST  24  /  ALT  8   /  AlkPhos  66  11-12      CONSTITUTIONAL: The patient appears cachectic and is in no apparent distress  EYES: EOMI, no scleral icterus, conjunctiva clear,  EARS, NOSE, MOUTH, THROAT:  changes from previous head and neck surgery  NECK: no thyromegaly, no cervical lymphadenopathy appreciated  PULMONARY: Clear to auscultation bilaterally, no wheezing or rhonchi, no use of accessory muscles  CARDIOVASCULAR: normal sinus rhythm, no murmurs, rubs or gallops, no peripheral edema  GASTROINTESTINAL: soft, nontender, nondistended, normoactive bowel sounds, no hepatomegaly or splenomegaly is appreciated  MUSCULOSKELETAL: no spinal tenderness to palpation. no joint swelling.   EXTREMITIES: No digital cyanosis or clubbing  LYMPH NODES: no cervical, no supraclavicular, axillary or inguinal lymphadenopathy  SKIN: no rashes, lesions, ulcers or bruising  PSYCH: alert and oriented x 3, appropriate affect        < from: CT Abdomen and Pelvis w/ IV Cont (11.12.18 @ 20:25) >   EXAM:  CT ABDOMEN AND PELVIS IC                         EXAM:  CT CHEST IC                          PROCEDURE DATE:  11/12/2018          INTERPRETATION:  Clinical Information: Esophageal cancer. Difficulty   swallowing.    Comparison: 06/20/2018    Procedure:  CT Angiography of the Chest was performed followed by portal venous phase   imaging of the Abdomen and Pelvis.  90 ml of Omnipaque 350 was injected intravenously. 10 ml were discarded.  Sagittal and coronal reformats were performed as well as 3D (MIP)   reconstructions.    Findings:    Chest:    Airways, pleura, lungs: Central airways patent. No pleural effusions.   There are new bilateral lung nodules (largest is 1.6 cm in the left upper   lobe), a few of which are cavitary.  Vasculature: No pulmonary emboli. Atherosclerotic disease of the aorta.  Mediastinum and tariq: Heart, pericardium unremarkable. There is an   esophageal stent containing debris. Diffuse thickening of the esophagus   is similar to prior. There is narrowing of the esophagus just superior to   the stent, possibly malignant stricture. Subcarinal lymphadenopathy   slightly decreased.  Chest wall and imaged neck: Right chest wall Mediport.  Neuromusculoskeletal: Unremarkable.    Abdomen/pelvis:    Hepatobiliary: The liver, gallbladder, and biliary tree are unremarkable  Spleen: Unremarkable  Pancreas: Unremarkable  Adrenal glands: Unremarkable  Urinary: The kidneys, ureters, and urinary bladder are unremarkable  Reproductive: The prostate and seminal vesicles are unremarkable.  Gastrointestinal: The stomach, small bowel, large bowel, and appendix are   unremarkable.  Peritoneum: Unremarkable  Vasculature: Unremarkable  Retroperitoneum: Gastrohepatic lymphadenopathy is unchanged.  Abdominal wall: Unremarkable  Neuromusculoskeletal: Degenerative disc disease    Impression:   -Progression of metastatic disease including new pulmonary metastatic   nodules detailed above, some of which are cavitary.  -Narrowing of the esophagus just superior to the stent, may be focal   stricture versus transient peristalsis.    < end of copied text >    Assessment and Recommendation:   		  A/P : Esophageal cancer with radiographic evidence of progression. An EGD   To be seen by Dr. Abebe  for question of a bx as two different malignancies Hx.  Consider molecular testing for tumor mutational burden to see if he would be eligible for immunotherapy., may need a PEG also.

## 2018-11-14 NOTE — PROGRESS NOTE ADULT - SUBJECTIVE AND OBJECTIVE BOX
CC: follow up symptoms/ GOC  INTERVAL HPI/OVERNIGHT EVENTS:  s/p endoscopy  PRESENT SYMPTOMS: SOURCE:  Patient/Family/Team    PAIN SCALE:  0 = none  1 = mild   2 = moderate  3 = severe    Pain: denies    Dyspnea:  [ ] YES [ x] NO  Anxiety:  [ ] YES [x ] NO  Fatigue: [ x] YES [ ] NO  Nausea: [ ] YES [ x] NO  Loss of Appetite: [x ] YES [ ] NO  Other symptoms: __________    MEDICATIONS  (STANDING):  dextrose 5% + sodium chloride 0.9%. 1000 milliLiter(s) (125 mL/Hr) IV Continuous <Continuous>  enoxaparin Injectable 40 milliGRAM(s) SubCutaneous every 24 hours  levothyroxine Injectable 12.5 MICROGram(s) IV Push at bedtime  pantoprazole  Injectable 40 milliGRAM(s) IV Push daily    MEDICATIONS  (PRN):  morphine  - Injectable 2 milliGRAM(s) IV Push every 4 hours PRN Moderate Pain (4 - 6)  ondansetron Injectable 4 milliGRAM(s) IV Push every 6 hours PRN Nausea      Allergies    No Known Allergies    Intolerances    Karnofsky Performance Score/Palliative Performance Status Version 2:  30  %    Vital Signs Last 24 Hrs  T(C): 36.8 (14 Nov 2018 08:01), Max: 37.3 (13 Nov 2018 22:42)  T(F): 98.2 (14 Nov 2018 08:01), Max: 99.1 (13 Nov 2018 22:42)  HR: 55 (14 Nov 2018 08:01) (53 - 57)  BP: 95/56 (14 Nov 2018 08:01) (95/56 - 146/67)  BP(mean): --  RR: 18 (14 Nov 2018 08:01) (18 - 18)  SpO2: 97% (14 Nov 2018 08:01) (97% - 99%)    PHYSICAL EXAM:    General: Thin man, NAD    HEENT: disfigured     Lungs: x[ ] comfortable [ ] tachypnea/labored breathing  [ ] excessive secretions    CV: [ x] normal  [ ] tachycardia    GI: [x ] normal  [ ] distended  [ ] tender  [ ] no BS               [ ] PEG/NG/OG tube    : [x] normal  [ ] incontinent  [ ] oliguria/anuria  [ ] ugarte    MSK: [x ] normal  [ ] weakness  [ ] edema             [ ] ambulatory  [ ] bedbound/wheelchair bound    Skin: [ ] normal  [ ] pressure ulcers- Stage_____  [ x] no rash    LABS:                        9.6    5.5   )-----------( 267      ( 14 Nov 2018 07:24 )             30.6     11-14    139  |  102  |  15.0  ----------------------------<  125<H>  4.0   |  28.0  |  0.47<L>    Ca    8.7      14 Nov 2018 07:24  Mg     1.7     11-14    TPro  8.0  /  Alb  3.7  /  TBili  0.4  /  DBili  x   /  AST  24  /  ALT  8   /  AlkPhos  66  11-12    PT/INR - ( 12 Nov 2018 18:24 )   PT: 13.1 sec;   INR: 1.13 ratio         PTT - ( 12 Nov 2018 18:24 )  PTT:30.0 sec    I&O's Summary    14 Nov 2018 07:01  -  14 Nov 2018 15:31  --------------------------------------------------------  IN: 750 mL / OUT: 0 mL / NET: 750 mL        Thank you for the opportunity to assist with the care of this patient.   [facility] Consult Service [phone].

## 2018-11-14 NOTE — PROGRESS NOTE ADULT - ASSESSMENT
73 yr male with  esophageal cancer with stent for  stricture now with  lung mets admitted with dysphagia concerning with progression of disease.

## 2018-11-14 NOTE — PROGRESS NOTE ADULT - ASSESSMENT
74 y/o male with progression of underlying esophageal cancer with new stricture with lung mets     Problem/Plan - 1:  ·  Problem: Esophageal stricture. likely from progression of esophageal cancer  --> for egd today      Problem/Plan - 2:  ·  Problem: Malignant neoplasm of esophagus with likely mets to lung  --> hem onc consult appreciated  --> ct surgery consult appreciated  --> for eventual lung biopsy?     Problem/Plan - 3:  ·  Problem: Hypothyroidism, unspecified type.  synthroid     Problem/Plan - 4  hypertension --> stable      Problem/Plan - 5:  ·  Problem: Protein calorie malnutrition.  Plan: nutrition cx.     prognosis poor  palliative following

## 2018-11-14 NOTE — PROGRESS NOTE ADULT - PROBLEM SELECTOR PLAN 2
s/p endoscopy - narrow lumen  Diet changed to puree with honey consistency  Will see how patient tolerates - not sure if will be able to receive adequate nutrition given narrow lumen  PEG?

## 2018-11-14 NOTE — CONSULT NOTE ADULT - SUBJECTIVE AND OBJECTIVE BOX
HPI:  72 y/o Czech speaking male, with PMH of head and neck cancer s/p left sided facial surgery for tumor resection (2007), Esophageal cancer with esophageal stricture s/p esophageal stent (with Dr. Herring) who received concurrent chemo/XRT with cisplatin/5 flurouracil with his last chemo therapy treatment being 1 month ago with Dr. Delacruz. Pt recently presented to [facility] ED on 11/12 with 2 days pain in his chest with/ swallowing liquids and solids and regurgitation while trying to eat without resolution. In ED a CT of neck/chest/abd/pelvis were done to evaluate  his previous stent which showed narrowing of esophagus superiorly to the stent as well as concern for progression of disease with possible lung metastasis. Since then patient was admitted and has been followed inpatient by palliative, GI who schedule an EGD for today and Heme/Onc (Dr. Delacruz) who recommended Thoracic surgery consult by Dr. Abebe. Upon presentation, pt was found to be NPO and stable in NAD. At this time he denies SOB, chest pain, gerd, hemoptysis, cough, neck pain or swelling.     PAST MEDICAL & SURGICAL HISTORY:  Malignant neoplasm of esophagus, unspecified location  High cholesterol  Hypothyroid  HTN (hypertension)  Head and neck cancer: 2007  History of head and neck cancer      REVIEW OF SYSTEMS      General:No Weight change/ Fatigue/ HA/Dizzy	    Skin/Breast: No Rashes/ Lesions/ Masses  	  Ophthalmologic: No Blurry vision/ Glaucoma/ Blindness  	  ENMT: No Hearing loss/ Drainage/ Lesions	    Respiratory and Thorax: No Cough/ Wheezing/ SOB/ Hemoptysis/ Sputum production  	  Cardiovascular: No Chest pain/ Palpitations/ Diaphoresis	    Gastrointestinal: No Nausea/ Vomiting/ Constipation/ Appetite Change	    Genitourinary: No Heamturia/ Dysuria/ Frequency change/ Impotence	    Musculoskeletal: No Pain/ Weakness/ Claudication	    Neurological: No Seizures/ TIA/CVA/ Parastesias	    Psychiatric: No Dementia/ Depression/ SI/HI	    Hematology/Lymphatics: No hx of bleeding/ Edema	    Endocrine:	No Hyperglycemia/ Hypoglycemia    Allergic/Immunologic:	 No Anaphylaxis/ Intolerance/ Recent illnesses    MEDICATIONS  (STANDING):  dextrose 5% + sodium chloride 0.9%. 1000 milliLiter(s) (125 mL/Hr) IV Continuous <Continuous>  enoxaparin Injectable 40 milliGRAM(s) SubCutaneous every 24 hours  levothyroxine Injectable 12.5 MICROGram(s) IV Push at bedtime  pantoprazole  Injectable 40 milliGRAM(s) IV Push daily    MEDICATIONS  (PRN):  morphine  - Injectable 2 milliGRAM(s) IV Push every 4 hours PRN Moderate Pain (4 - 6)  ondansetron Injectable 4 milliGRAM(s) IV Push every 6 hours PRN Nausea      Allergies    No Known Allergies    Intolerances        SOCIAL HISTORY:  Marital status:  Lives with:  Occupation:    Tobacco use:  Alcohol use:  Illicit drug use:         FAMILY HISTORY:  Family history of stomach cancer (Sibling)      Vital Signs Last 24 Hrs  T(C): 36.8 (14 Nov 2018 08:01), Max: 37.3 (13 Nov 2018 22:42)  T(F): 98.2 (14 Nov 2018 08:01), Max: 99.1 (13 Nov 2018 22:42)  HR: 55 (14 Nov 2018 08:01) (53 - 57)  BP: 95/56 (14 Nov 2018 08:01) (95/56 - 146/67)  BP(mean): --  RR: 18 (14 Nov 2018 08:01) (18 - 18)  SpO2: 97% (14 Nov 2018 08:01) (97% - 99%)    General: WN/WD NAD  Neurology: Awake, nonfocal, KUMAR x 4  Eyes: Scleras clear, PERRLA/ EOMI, Gross vision intact  ENT: Gross hearing intact, grossly patent pharynx, no stridor  Neck: Neck supple, trachea midline, No JVD,   Respiratory: CTA B/L, No wheezing, rales, rhonchi  CV: RRR, S1S2, no murmurs, rubs or gallops  Abdominal: Soft, NT, ND +BS,   Extremities: No edema, + peripheral pulses  Skin: No Rashes, Hematoma, Ecchymosis  Lymphatic: No Neck, axilla, groin LAD      LABS:                        9.6    5.5   )-----------( 267      ( 14 Nov 2018 07:24 )             30.6     11-14    139  |  102  |  15.0  ----------------------------<  125<H>  4.0   |  28.0  |  0.47<L>    Ca    8.7      14 Nov 2018 07:24  Mg     1.7     11-14    TPro  8.0  /  Alb  3.7  /  TBili  0.4  /  DBili  x   /  AST  24  /  ALT  8   /  AlkPhos  66  11-12    PT/INR - ( 12 Nov 2018 18:24 )   PT: 13.1 sec;   INR: 1.13 ratio         PTT - ( 12 Nov 2018 18:24 )  PTT:30.0 sec      RADIOLOGY & ADDITIONAL STUDIES:    ASSESSMENT:   73yMalePAST MEDICAL & SURGICAL HISTORY:  Malignant neoplasm of esophagus, unspecified location  High cholesterol  Hypothyroid  HTN (hypertension)  Head and neck cancer: 2007  History of head and neck cancer  HEALTH ISSUES - PROBLEM Dx:  Encounter for palliative care: Encounter for palliative care  Cachexia: Cachexia  Dysphagia: Dysphagia  Protein calorie malnutrition: Protein calorie malnutrition  Hypertension, unspecified type: Hypertension, unspecified type  Hypothyroidism, unspecified type: Hypothyroidism, unspecified type  Malignant neoplasm of esophagus, unspecified location: Malignant neoplasm of esophagus, unspecified location  Esophageal stricture: Esophageal stricture HPI:  72 y/o Telugu speaking male, with PMH of head and neck cancer s/p left sided facial surgery for tumor resection (2007), Esophageal cancer with esophageal stricture s/p esophageal stent (with Dr. Herring) who received concurrent chemo/XRT with cisplatin/5 flurouracil with his last chemo therapy treatment being 1 month ago with Dr. Delacruz. Pt recently presented to [facility] ED on 11/12 with 2 days pain in his chest with/ swallowing liquids and solids and regurgitation while trying to eat without resolution. In ED a CT of neck/chest/abd/pelvis were done to evaluate  his previous stent which showed narrowing of esophagus superiorly to the stent as well as concern for progression of disease with possible lung metastasis. Since then patient was admitted and has been followed inpatient by palliative, GI who schedule an EGD for today and Heme/Onc (Dr. Delacruz) who recommended Thoracic surgery consult by Dr. Abebe. Upon presentation, pt was found to be NPO and stable in NAD. At this time he denies SOB, chest pain, gerd, hemoptysis, cough, neck pain or swelling.     PAST MEDICAL & SURGICAL HISTORY:  Malignant neoplasm of esophagus, unspecified location  High cholesterol  Hypothyroid  HTN (hypertension)  Head and neck cancer: 2007  History of head and neck cancer        Allergic/Immunologic:	 No Anaphylaxis/ Intolerance/ Recent illnesses    MEDICATIONS  (STANDING):  dextrose 5% + sodium chloride 0.9%. 1000 milliLiter(s) (125 mL/Hr) IV Continuous <Continuous>  enoxaparin Injectable 40 milliGRAM(s) SubCutaneous every 24 hours  levothyroxine Injectable 12.5 MICROGram(s) IV Push at bedtime  pantoprazole  Injectable 40 milliGRAM(s) IV Push daily    MEDICATIONS  (PRN):  morphine  - Injectable 2 milliGRAM(s) IV Push every 4 hours PRN Moderate Pain (4 - 6)  ondansetron Injectable 4 milliGRAM(s) IV Push every 6 hours PRN Nausea      Allergies    No Known Allergies    Intolerances        SOCIAL HISTORY:  Lives with: Friend, [place] family [place]  Occupation: Retired, used to work with machines    Tobacco use: In the past social, could not recall how much and how long  Alcohol use: Denies  Illicit drug use: Denies         FAMILY HISTORY:  Family history of stomach cancer (Sibling)      Vital Signs Last 24 Hrs  T(C): 36.8 (14 Nov 2018 08:01), Max: 37.3 (13 Nov 2018 22:42)  T(F): 98.2 (14 Nov 2018 08:01), Max: 99.1 (13 Nov 2018 22:42)  HR: 55 (14 Nov 2018 08:01) (53 - 57)  BP: 95/56 (14 Nov 2018 08:01) (95/56 - 146/67)  BP(mean): --  RR: 18 (14 Nov 2018 08:01) (18 - 18)  SpO2: 97% (14 Nov 2018 08:01) (97% - 99%)    General: cachectic thin male in NAD  Neurology: Awake, nonfocal, KUMAR x 4  Eyes: Scleras clear, PERRLA/ EOMI, Gross vision intact  ENT: Facial surgical incision present down left side of face into the chin, pt does not have left mandible or dentures.   Neck: Neck supple, trachea midline, No JVD, small nodularity palpated on left side of neck  Respiratory: CTA B/L, No wheezing, rales, rhonchi  CV: RRR, S1S2, no murmurs, rubs or gallops  Abdominal: Soft, NT, ND +BS, linear incision to abdominal wall from prior surgery (skin flap to face)  Extremities: No edema, + peripheral pulses  Skin: No Rashes, Hematoma, Ecchymosis  Lymphatic: No Neck, axilla, groin LAD      LABS:                        9.6    5.5   )-----------( 267      ( 14 Nov 2018 07:24 )             30.6     11-14    139  |  102  |  15.0  ----------------------------<  125<H>  4.0   |  28.0  |  0.47<L>    Ca    8.7      14 Nov 2018 07:24  Mg     1.7     11-14    TPro  8.0  /  Alb  3.7  /  TBili  0.4  /  DBili  x   /  AST  24  /  ALT  8   /  AlkPhos  66  11-12    PT/INR - ( 12 Nov 2018 18:24 )   PT: 13.1 sec;   INR: 1.13 ratio         PTT - ( 12 Nov 2018 18:24 )  PTT:30.0 sec      RADIOLOGY & ADDITIONAL STUDIES:      CT:   < from: CT Neck Soft Tissue w/ IV Cont (11.12.18 @ 20:25) >  FINDINGS:    Nasopharynx: Normal.    Oropharynx: Anterior tongue has been resected. No significant tonsillar   enlargement. No peritonsillar abscess.    Hypopharynx: Normal.   Larynx: Normal. Normal epiglottis.  Edematous and mildly thickened   bilateral aryepiglottic folds, likely secondary to treatment changes.   Trachea: Normal.    Retropharyngeal space: Normal.    Submandibular/Parotid glands: Bilateral parotid and right submandibular   glands are within normal limits. Atrophic versus surgically removed left   submandibular gland.    Thyroid: Normal. No enlarged or calcified nodules.    Bones/joints:  Partial resection of the left mandible with associated   softtissue deformity.Degenerative changes of the cervical spine. No   destructive bony lesion.   Soft tissues:  No enhancing soft tissue mass identified. Cachexia.   Vasculature: No acute findings. Partially imaged right jugular central   venous catheter.   Lymph nodes: Normal. No lymphadenopathy. No necrotic lymph node.   Lung apices: Mild biapical pleural/parenchymal scarring.    Miscellaneous: Partially imaged upper esophagus demonstrates mild   peripheral enhancement suggesting esophagitis. The upper esophagus is   air-filled and mildly patulous.    IMPRESSION:   No abnormal soft tissue enhancement or evidence for metastatic disease to   the neck. No lymphadenopathy    Partial left mandibulectomy and anterior tongue resection.     Partially imaged upper esophagus demonstrates mild peripheral enhancement   suggesting esophagitis. Upper esophagus is air-filled and mildly patulous.      < end of copied text >      EGD:   	The gastroscope was used initially to intubate the esophagus but was met with stenosis at the proximal end of the previously placed stent.  The stenosis was approximately 5 mm in diameter.  The gastroscope was withdrawn and replaced with the ultrathin endoscope which easily traversed the proximal stenosis.  There was no obstruction or significant debris within the stent lumen, and the ultrathin was advanced easily to the second portion of the duodenum.  Because of a large amount of retained fluid and the limitations of the endoscope, a thorough examination of the stomach, gastric cardia and duodenum was not possible.	      ASSESSMENT:   73yMalePAST MEDICAL & SURGICAL HISTORY:  Malignant neoplasm of esophagus, unspecified location  High cholesterol  Hypothyroid  HTN (hypertension)  Head and neck cancer: 2007  History of head and neck cancer  HEALTH ISSUES - PROBLEM Dx:  Encounter for palliative care: Encounter for palliative care  Cachexia: Cachexia  Dysphagia: Dysphagia  Protein calorie malnutrition: Protein calorie malnutrition  Hypertension, unspecified type: Hypertension, unspecified type  Hypothyroidism, unspecified type: Hypothyroidism, unspecified type  Malignant neoplasm of esophagus, unspecified location: Malignant neoplasm of esophagus, unspecified location  Esophageal stricture: Esophageal stricture

## 2018-11-14 NOTE — BRIEF OPERATIVE NOTE - OPERATION/FINDINGS
The gastroscope was used initially to intubate the esophagus but was met with stenosis at the proximal end of the previously placed stent.  The stenosis was approximately 5 mm in diameter.  The gastroscope was withdrawn and replaced with the ultrathin endoscope which easily traversed the proximal stenosis.  There was no obstruction or significant debris within the stent lumen, and the ultrathin was advanced easily to the second portion of the duodenum.  Because of a large amount of retained fluid and the limitations of the endoscope, a thorough examination of the stomach, gastric cardia and duodenum was not possible.

## 2018-11-14 NOTE — PROGRESS NOTE ADULT - PROBLEM SELECTOR PLAN 4
Discussed appointing a health care proxy. He names his friend Brice Montalvo as his proxy, but did not wish to complete form. Form left at bedside for his review.  Plan for further goals of care discussions.

## 2018-11-14 NOTE — BRIEF OPERATIVE NOTE - PRE-OP DX
Dysphagia, unspecified type  11/14/2018    SUJIT Simpson  Malignant neoplasm of esophagus, unspecified location  11/14/2018    SUJIT Simpson  Regurgitation of food  11/14/2018    SUJIT Simpson

## 2018-11-15 ENCOUNTER — TRANSCRIPTION ENCOUNTER (OUTPATIENT)
Age: 73
End: 2018-11-15

## 2018-11-15 DIAGNOSIS — Z71.89 OTHER SPECIFIED COUNSELING: ICD-10-CM

## 2018-11-15 PROCEDURE — 99232 SBSQ HOSP IP/OBS MODERATE 35: CPT

## 2018-11-15 PROCEDURE — 99223 1ST HOSP IP/OBS HIGH 75: CPT

## 2018-11-15 PROCEDURE — 99233 SBSQ HOSP IP/OBS HIGH 50: CPT

## 2018-11-15 PROCEDURE — 99231 SBSQ HOSP IP/OBS SF/LOW 25: CPT

## 2018-11-15 PROCEDURE — 99497 ADVNCD CARE PLAN 30 MIN: CPT | Mod: 25

## 2018-11-15 RX ADMIN — PANTOPRAZOLE SODIUM 40 MILLIGRAM(S): 20 TABLET, DELAYED RELEASE ORAL at 18:04

## 2018-11-15 RX ADMIN — ENOXAPARIN SODIUM 40 MILLIGRAM(S): 100 INJECTION SUBCUTANEOUS at 05:00

## 2018-11-15 RX ADMIN — Medication 12.5 MICROGRAM(S): at 21:18

## 2018-11-15 NOTE — PROGRESS NOTE ADULT - SUBJECTIVE AND OBJECTIVE BOX
CC: follow up GOC  INTERVAL HPI/OVERNIGHT EVENTS:    PRESENT SYMPTOMS: SOURCE:  Patient/Family/Team    PAIN SCALE:  0 = none  1 = mild   2 = moderate  3 = severe    Pain: denies    Dyspnea:  [ ] YES [x ] NO  Anxiety:  [x ] YES [ ] NO  Fatigue: [ x] YES [ ] NO  Nausea: [ ] YES [x ] NO  Loss of Appetite: [ x] YES [ ] NO  Other symptoms: __________    MEDICATIONS  (STANDING):  dextrose 5% + sodium chloride 0.9%. 1000 milliLiter(s) (125 mL/Hr) IV Continuous <Continuous>  enoxaparin Injectable 40 milliGRAM(s) SubCutaneous every 24 hours  levothyroxine Injectable 12.5 MICROGram(s) IV Push at bedtime  pantoprazole  Injectable 40 milliGRAM(s) IV Push daily    MEDICATIONS  (PRN):  morphine  - Injectable 2 milliGRAM(s) IV Push every 4 hours PRN Moderate Pain (4 - 6)  ondansetron Injectable 4 milliGRAM(s) IV Push every 6 hours PRN Nausea      Allergies    No Known Allergies    Intolerances      Karnofsky Performance Score/Palliative Performance Status Version 2:  40 %    Vital Signs Last 24 Hrs  T(C): 36.7 (15 Nov 2018 15:59), Max: 36.9 (15 Nov 2018 07:51)  T(F): 98 (15 Nov 2018 15:59), Max: 98.4 (15 Nov 2018 07:51)  HR: 58 (15 Nov 2018 15:59) (58 - 63)  BP: 129/63 (15 Nov 2018 15:59) (111/62 - 129/63)  BP(mean): --  RR: 18 (15 Nov 2018 15:59) (18 - 18)  SpO2: 98% (15 Nov 2018 15:59) (97% - 98%)    PHYSICAL EXAM:    General: awake alert NAD    HEENT: disfigured    Lungs: [x ] comfortable [ ] tachypnea/labored breathing  [ ] excessive secretions    CV: [ x] normal  [ ] tachycardia    GI: [x ] normal  [ ] distended  [ ] tender  [ ] no BS               [ ] PEG/NG/OG tube    : [ x] normal  [ ] incontinent  [ ] oliguria/anuria  [ ] ugarte    MSK: [ ] normal  [x ] weakness  [ ] edema             [ ] ambulatory  [ ] bedbound/wheelchair bound    Skin: [ ] normal  [ ] pressure ulcers- Stage_____  [x ] no rash    LABS:                        9.6    5.5   )-----------( 267      ( 14 Nov 2018 07:24 )             30.6     11-14    139  |  102  |  15.0  ----------------------------<  125<H>  4.0   |  28.0  |  0.47<L>    Ca    8.7      14 Nov 2018 07:24  Mg     1.7     11-14          I&O's Summary    14 Nov 2018 07:01  -  15 Nov 2018 07:00  --------------------------------------------------------  IN: 2750 mL / OUT: 0 mL / NET: 2750 mL    15 Nov 2018 07:01  -  15 Nov 2018 16:36  --------------------------------------------------------  IN: 1250 mL / OUT: 0 mL / NET: 1250 mL      More than 50% time spent in counseling and coordinating care. ______ Minutes.     Thank you for the opportunity to assist with the care of this patient.   [facility] [phone].

## 2018-11-15 NOTE — DIETITIAN INITIAL EVALUATION ADULT. - NS AS NUTRI INTERV MEALS SNACK
When medically feasible advance diet, consistency per SLP. C/w Ensure Enlive TID/General/healthful diet Advance diet as tolerated; Ensure Enlive TID/General/healthful diet

## 2018-11-15 NOTE — PROGRESS NOTE ADULT - PROBLEM SELECTOR PLAN 1
11/12 CT scan of neck found stenosis proximal to esophageal stent   GI, hematology and palliative following  EGD yesterday per GI findings consistent with 5mm stenosis of stent, advanced to pureed honey thickened diet  Continue care per primary team   Thoracic surgery consulted for findings on CT chest consistent with new b/l Lung nodules with concern for metastasis  From a thoracic standpoint there is no immediate intervention to be done at this time, would recommend possible IR Bx if able    Will continue to follow  Plan discussed with Dr. Abebe. 11/12 CT scan of neck found stenosis proximal to esophageal stent   Continue care per primary team  GI, hematology and palliative following  EGD yesterday per GI findings consistent with 5mm stenosis of stent, advanced to   Appears to be uncontrolled esophageal Cancer  Could consider IR bx of lung nodules if felt to represent new disease process or markers needed.  May need feeding tube (likely not candidate for PEG)  Will continue to follow  Plan discussed with Dr. Abebe.

## 2018-11-15 NOTE — PROGRESS NOTE ADULT - SUBJECTIVE AND OBJECTIVE BOX
HPI:  72 y/o male with history of head and neck cancer s/p left sided facial surgery for laryngeal tumor resection in 07, Esophageal cancer with esophageal stricture s/p esophageal stent. this  year, adenocarcinoma.   The patient received concurrent chemo/RT with Cisplatinum and 5 FU, he was recently noted to have lung nodules and a biopsy was being scheduled. The patient now presents with two days of dysphagia  to solids and liquids. He denies hematemesis or melena, he denies pain. He underwent a CT which demonstrated narrowing of esophagus superiorly to the stent as well as evidence of progression of disease with lung mets. No other acute findings. He is scheduled to undergo an EGD.  Seen by Dr. Abebe; consult appreciated-to discuss need for tissue  GI note appreciated; EGD shows entire stent  luminal  narrowing    PAST MEDICAL & SURGICAL HISTORY:  Malignant neoplasm of esophagus, unspecified location  High cholesterol  Hypothyroid  HTN (hypertension)  Head and neck cancer: 2007  History of head and neck cancer    FAMILY HISTORY:  Family history of stomach cancer (Sibling)    Former smoker    MEDICATIONS  (STANDING):  dextrose 5% + sodium chloride 0.9%. 1000 milliLiter(s) (125 mL/Hr) IV Continuous <Continuous>  enoxaparin Injectable 40 milliGRAM(s) SubCutaneous every 24 hours  levothyroxine Injectable 12.5 MICROGram(s) IV Push at bedtime  pantoprazole  Injectable 40 milliGRAM(s) IV Push daily    CBC Full  -  ( 14 Nov 2018 07:24 )  WBC Count : 5.5 K/uL  Hemoglobin : 9.6 g/dL  Hematocrit : 30.6 %  Platelet Count - Automated : 267 K/uL  Mean Cell Volume : 91.6 fl  Mean Cell Hemoglobin : 28.7 pg  Mean Cell Hemoglobin Concentration : 31.4 g/dL  Auto Neutrophil # : x  Auto Lymphocyte # : x  Auto Monocyte # : x  Auto Eosinophil # : x  Auto Basophil # : x  Auto Neutrophil % : x  Auto Lymphocyte % : x  Auto Monocyte % : x  Auto Eosinophil % : x  Auto Basophil % : x    11-14    139  |  102  |  15.0  ----------------------------<  125<H>  4.0   |  28.0  |  0.47<L>    Ca    8.7      14 Nov 2018 07:24  Mg     1.7     11-14    TPro  8.0  /  Alb  3.7  /  TBili  0.4  /  DBili  x   /  AST  24  /  ALT  8   /  AlkPhos  66  11-12      CONSTITUTIONAL: The patient appears cachectic and is in no apparent distress  EYES: EOMI, no scleral icterus, conjunctiva clear,  EARS, NOSE, MOUTH, THROAT:  changes from previous head and neck surgery  NECK: no thyromegaly, no cervical lymphadenopathy appreciated  PULMONARY: Clear to auscultation bilaterally, no wheezing or rhonchi, no use of accessory muscles  CARDIOVASCULAR: normal sinus rhythm, no murmurs, rubs or gallops, no peripheral edema  GASTROINTESTINAL: soft, nontender, nondistended, normoactive bowel sounds, no hepatomegaly or splenomegaly is appreciated  MUSCULOSKELETAL: no spinal tenderness to palpation. no joint swelling.   EXTREMITIES: No digital cyanosis or clubbing  LYMPH NODES: no cervical, no supraclavicular, axillary or inguinal lymphadenopathy  SKIN: no rashes, lesions, ulcers or bruising  PSYCH: alert and oriented x 3, appropriate affect        < from: CT Abdomen and Pelvis w/ IV Cont (11.12.18 @ 20:25) >   EXAM:  CT ABDOMEN AND PELVIS IC                         EXAM:  CT CHEST IC                          PROCEDURE DATE:  11/12/2018          INTERPRETATION:  Clinical Information: Esophageal cancer. Difficulty   swallowing.    Comparison: 06/20/2018    Procedure:  CT Angiography of the Chest was performed followed by portal venous phase   imaging of the Abdomen and Pelvis.  90 ml of Omnipaque 350 was injected intravenously. 10 ml were discarded.  Sagittal and coronal reformats were performed as well as 3D (MIP)   reconstructions.    Findings:    Chest:    Airways, pleura, lungs: Central airways patent. No pleural effusions.   There are new bilateral lung nodules (largest is 1.6 cm in the left upper   lobe), a few of which are cavitary.  Vasculature: No pulmonary emboli. Atherosclerotic disease of the aorta.  Mediastinum and tariq: Heart, pericardium unremarkable. There is an   esophageal stent containing debris. Diffuse thickening of the esophagus   is similar to prior. There is narrowing of the esophagus just superior to   the stent, possibly malignant stricture. Subcarinal lymphadenopathy   slightly decreased.  Chest wall and imaged neck: Right chest wall Mediport.  Neuromusculoskeletal: Unremarkable.    Abdomen/pelvis:    Hepatobiliary: The liver, gallbladder, and biliary tree are unremarkable  Spleen: Unremarkable  Pancreas: Unremarkable  Adrenal glands: Unremarkable  Urinary: The kidneys, ureters, and urinary bladder are unremarkable  Reproductive: The prostate and seminal vesicles are unremarkable.  Gastrointestinal: The stomach, small bowel, large bowel, and appendix are   unremarkable.  Peritoneum: Unremarkable  Vasculature: Unremarkable  Retroperitoneum: Gastrohepatic lymphadenopathy is unchanged.  Abdominal wall: Unremarkable  Neuromusculoskeletal: Degenerative disc disease    Impression:   -Progression of metastatic disease including new pulmonary metastatic   nodules detailed above, some of which are cavitary.  -Narrowing of the esophagus just superior to the stent, may be focal   stricture versus transient peristalsis.    < end of copied text >    Assessment and Recommendation:   		  A/P : Esophageal cancer with radiographic evidence of progression. An EGD showed entire stent luminal narrowing-rec liquid diet  To be seen by Dr. Abebe  for question of a bx as two different malignancies Hx.-to discuss need for tissue  Consider molecular testing for tumor mutational burden to see if he would be eligible for immunotherapy., may need a PEG also.

## 2018-11-15 NOTE — DISCHARGE NOTE ADULT - INSTRUCTIONS
Needs to stay on a full liquid diet with ensure supplements.  No solid food as lumen is too narrow to accomodate solid food.  OK chris DC from GI POV.  GI office follow up with Dr Lopez in 2 weeks.

## 2018-11-15 NOTE — DISCHARGE NOTE ADULT - PATIENT PORTAL LINK FT
You can access the [facility] Patient Portal, offered by [facility], by registering with the following website: [email or web address]

## 2018-11-15 NOTE — DISCHARGE NOTE ADULT - CARE PROVIDERS DIRECT ADDRESSES
,DirectAddress_Unknown,[email or web address]

## 2018-11-15 NOTE — DISCHARGE NOTE ADULT - PLAN OF CARE
improved s/p egd which showed Intact esophageal stent. Luminal narrowing.  No tumor ingrowth or overgrowth.  Needs to stay on a full liquid diet qith ensure supplements.  No solid food as lumen is too narrow to accomodate solid food.  BRIAN perez DC from GI POV.  GI office follow up with Dr Lopez in 2 weeks. f/u with endocrine as an outpatient found to have new lung nodule on ct scna. patient is aware of diagnosis.  was seen by dr light CT surgery and recommended From a thoracic standpoint there is no immediate intervention to be done at this time, would recommend possible IR Bx if able . Discusssed with oncology  alex and recommended outpatient follow up with oncology and ct surgery as an outpatient. ensure enlive tid

## 2018-11-15 NOTE — PROGRESS NOTE ADULT - PROBLEM SELECTOR PLAN 5
Patient frustrated being on liquid diet. Offered support.  Patient to follow up with his GI.   He plans to follow up with oncology for further treatments if possible
nutrition cx

## 2018-11-15 NOTE — DIETITIAN INITIAL EVALUATION ADULT. - OTHER INFO
Pt admit with esophageal obstruction, EGD yesterday found to have 5mm esophageal stenosis of stent. PMH of head and neck CA s/p L. sided surgery tumor resection (2007). Pt also with dysphagia, poor PO intake x2 days PTA due to difficulty swallowing. Diet was advanced to full liquid, tolerating at this time. Gradual, desired 7lb weight gain since previous admission (9/2018) noted.

## 2018-11-15 NOTE — DISCHARGE NOTE ADULT - SECONDARY DIAGNOSIS.
Hypothyroidism, unspecified type Hypertension, unspecified type Lung nodules Protein calorie malnutrition

## 2018-11-15 NOTE — DISCHARGE NOTE ADULT - MEDICATION SUMMARY - MEDICATIONS TO TAKE
I will START or STAY ON the medications listed below when I get home from the hospital:    ensure enlive  -- 1 dose(s) by mouth 3 times a day  -- Indication: For Malnutrition    aspirin 81 mg oral delayed release tablet  -- 1 tab(s) by mouth once a day  -- Indication: For Home med    Zofran 4 mg oral tablet  -- 15 tab(s) by mouth every 8 hours, As Needed   -- Indication: For Dysphagia    simvastatin 10 mg oral tablet  -- 1 tab(s) by mouth once a day (at bedtime)  -- Indication: For Home meds    amLODIPine 5 mg oral tablet  -- 1 tab(s) by mouth once a day  -- Indication: For Htn    ferrous sulfate 325 mg (65 mg elemental iron) oral tablet  -- 1 tab(s) by mouth 2 times a day  -- Indication: For anemia    pantoprazole 40 mg oral delayed release tablet  -- 1 tab(s) by mouth 2 times a day  -- Indication: For Home meds    levothyroxine 25 mcg (0.025 mg) oral tablet  -- 1 tab(s) by mouth once a day  -- Indication: For Hypothyroidism, unspecified type

## 2018-11-15 NOTE — DISCHARGE NOTE ADULT - CARE PLAN
Principal Discharge DX:	Dysphagia, unspecified type  Goal:	improved  Assessment and plan of treatment:	s/p egd which showed Intact esophageal stent. Luminal narrowing.  No tumor ingrowth or overgrowth.  Needs to stay on a full liquid diet qith ensure supplements.  No solid food as lumen is too narrow to accomodate solid food.  OK chris DC from GI POV.  GI office follow up with Dr Lopez in 2 weeks.  Secondary Diagnosis:	Hypothyroidism, unspecified type  Assessment and plan of treatment:	f/u with endocrine as an outpatient  Secondary Diagnosis:	Hypertension, unspecified type  Secondary Diagnosis:	Lung nodules  Assessment and plan of treatment:	found to have new lung nodule on ct scna. patient is aware of diagnosis.  was seen by dr light CT surgery and recommended From a thoracic standpoint there is no immediate intervention to be done at this time, would recommend possible IR Bx if able . Discusssed with oncology  alex and recommended outpatient follow up with oncology and ct surgery as an outpatient.  Secondary Diagnosis:	Protein calorie malnutrition  Assessment and plan of treatment:	ensure enlive tid

## 2018-11-15 NOTE — PROGRESS NOTE ADULT - SUBJECTIVE AND OBJECTIVE BOX
Patient seen and examined; chart reviewed.  EGD results from yesterday noted.  No interval changes.  No debris or occlusion of the stent.  Entire stent shows luminal narrowing.  Able only to admit the tiny nasal scope, 5.5 mm External diameter.  No overgrowth or ingrowth of tumor.    Normal stomach/ duodenum.  No fever or bleeding since procedure.      PAST MEDICAL & SURGICAL HISTORY:  Malignant neoplasm of esophagus, unspecified location  High cholesterol  Hypothyroid  HTN (hypertension)  Head and neck cancer: 2007  History of head and neck cancer      ROS:  No Heartburn, regurgitation, dysphagia, odynophagia.  No dyspepsia  No abdominal pain.    No Nausea, vomiting.  No Bleeding.  No hematemesis.   No diarrhea.    No hematochesia.  No weight loss, anorexia.  No edema.      MEDICATIONS  (STANDING):  dextrose 5% + sodium chloride 0.9%. 1000 milliLiter(s) (125 mL/Hr) IV Continuous <Continuous>  enoxaparin Injectable 40 milliGRAM(s) SubCutaneous every 24 hours  levothyroxine Injectable 12.5 MICROGram(s) IV Push at bedtime  pantoprazole  Injectable 40 milliGRAM(s) IV Push daily    MEDICATIONS  (PRN):  morphine  - Injectable 2 milliGRAM(s) IV Push every 4 hours PRN Moderate Pain (4 - 6)  ondansetron Injectable 4 milliGRAM(s) IV Push every 6 hours PRN Nausea      Allergies    No Known Allergies    Intolerances        Vital Signs Last 24 Hrs  T(C): 36.9 (15 Nov 2018 07:51), Max: 36.9 (15 Nov 2018 07:51)  T(F): 98.4 (15 Nov 2018 07:51), Max: 98.4 (15 Nov 2018 07:51)  HR: 63 (14 Nov 2018 23:51) (53 - 63)  BP: 111/62 (15 Nov 2018 07:51) (111/62 - 154/76)  BP(mean): --  RR: 18 (15 Nov 2018 07:51) (18 - 18)  SpO2: 98% (15 Nov 2018 07:51) (97% - 98%)    PHYSICAL EXAM:    GENERAL: NAD, well-groomed, well-developed  HEAD:  Atraumatic, Normocephalic  EYES: EOMI, PERRLA, conjunctiva and sclera clear  ENMT: No tonsillar erythema, exudates, or enlargement; Moist mucous membranes, Good dentition, No lesions  NECK: Supple, No JVD, Normal thyroid;  no crepitus.    CHEST/LUNG: Clear to percussion bilaterally; No rales, rhonchi, wheezing, or rubs  HEART: Regular rate and rhythm; No murmurs, rubs, or gallops  ABDOMEN: Soft, Nontender, Nondistended; Bowel sounds present; Scaphoid.    EXTREMITIES:  2+ Peripheral Pulses, No clubbing, cyanosis, or edema  LYMPH: No lymphadenopathy noted  SKIN: No rashes or lesions      LABS:                        9.6    5.5   )-----------( 267      ( 14 Nov 2018 07:24 )             30.6     11-14    139  |  102  |  15.0  ----------------------------<  125<H>  4.0   |  28.0  |  0.47<L>    Ca    8.7      14 Nov 2018 07:24  Mg     1.7     11-14               RADIOLOGY & ADDITIONAL STUDIES:

## 2018-11-15 NOTE — PROGRESS NOTE ADULT - SUBJECTIVE AND OBJECTIVE BOX
Pt seen and imaging reviewed.  Comfortable at rest.  AVSS  Ct proximal esophageal stenosis above stent  EGD - stenosis above stent?tumor?  Appears to be uncontrolled esoph ca.  Could consider IR bx of lung nodules if felt to represent new disease process or markers needed.    May need feeding tube (likely not candidate for PEG)

## 2018-11-15 NOTE — PROGRESS NOTE ADULT - PROBLEM SELECTOR PLAN 4
Discussed advance directives/CPR and health care proxy.  Patient considering his sister who lives in [place] as HCP - did not wish to complete form. Discussed CPR - risks/benefits.  Patient not decisional - encouraged him to speak with his health care proxy regarding these wishes.

## 2018-11-15 NOTE — CHART NOTE - NSCHARTNOTEFT_GEN_A_CORE
Upon Nutritional Assessment by the Registered Dietitian your patient was determined to meet criteria / has evidence of the following diagnosis/diagnoses:          [X] Severe Chronic Protein Calorie Malnutrition        [X] Underweight / BMI <19        Findings as based on:  •  Comprehensive nutrition assessment and consultation  •  Calorie counts (nutrient intake analysis)  •  Food acceptance and intake status from observations by staff  •  NFPE: severe muscle wasting (temples, clavicle) moderate fat loss (buccals, orbital)  •  Acute dec PO intake consuming <50% of est needs x 5 days  •  Intervention secondary to interdisciplinary rounds  •  Low BW/BMI (16.9)      Treatment:    The following diet has been recommended:  1) Ensure Enlive TID  2) Advance as tolerated per GI/SLP      PROVIDER Section:     By signing this assessment you are acknowledging and agree with the diagnosis/diagnoses assigned by the Registered Dietitian    Comments:

## 2018-11-15 NOTE — PROGRESS NOTE ADULT - SUBJECTIVE AND OBJECTIVE BOX
73y Male presents lying in bed in NAD.     Of note: pt Lithuanian speaking only    Vital Signs:  Vital Signs Last 24 Hrs  T(C): 36.9 (11-15-18 @ 07:51), Max: 36.9 (11-15-18 @ 07:51)  T(F): 98.4 (11-15-18 @ 07:51), Max: 98.4 (11-15-18 @ 07:51)  HR: 63 (11-14-18 @ 23:51) (53 - 63)  BP: 111/62 (11-15-18 @ 07:51) (111/62 - 154/76)  RR: 18 (11-15-18 @ 07:51) (18 - 18)  SpO2: 98% (11-15-18 @ 07:51) (97% - 98%) on (O2)      11-14 @ 07:01  -  11-15 @ 07:00  --------------------------------------------------------  IN:    dextrose 5% + sodium chloride 0.9%.: 2750 mL  Total IN: 2750 mL    OUT:  Total OUT: 0 mL    Total NET: 2750 mL      Relevant labs, Radiology and Medications reviewed  Neuro: A+O x 3, non-focal, speech clear and intact  HEENT: PERRL, EOMI, oral mucosa pink and moist  Neck: supple, no JVD  CV: regular rate, regular rhythm, +S1S2, no murmurs or rub  Pulm/chest: lung sounds CTA and equal bilaterally, no accessory muscle use noted  Abd: soft, NT, ND, +BS  Ext: KUMAR x 4, no C/C/E  Skin: warm, well perfused, no [id number]    139  |  102  |  15.0  ----------------------------<  125<H>  4.0   |  28.0  |  0.47<L>    Ca    8.7      14 Nov 2018 07:24  Mg     1.7     11-14                            9.6    5.5   )-----------( 267      ( 14 Nov 2018 07:24 )             30.6         Pertinent Physical Exam  General: cachectic thin male in NAD  Neurology: Awake, nonfocal, KUMAR x 4  ENT: Facial surgical incision present down left side of face into the chin, pt does not have left mandible or dentures.   Neck: Neck supple, trachea midline, No JVD, small nodularity palpated on left side of neck  Respiratory: CTA B/L, No wheezing, rales, rhonchi  CV: RRR, S1S2, no murmurs, rubs or gallops  Abdominal: Soft, NT, ND +BS, linear incision to abdominal wall from prior surgery (skin flap to face)  Extremities: No edema, + peripheral pulses

## 2018-11-15 NOTE — PROGRESS NOTE ADULT - ASSESSMENT
Intact esophageal stent. Luminal narrowing.  No tumor ingrowth or overgrowth.  Needs to stay on a full liquid diet qith ensure supplements.  No solid food as lumen is too narrow to accomodate solid food.  BRIAN perez DC from GI POV.  GI office follow up with Dr Lopez in 2 weeks.

## 2018-11-15 NOTE — DISCHARGE NOTE ADULT - HOSPITAL COURSE
74 y/o Thai speaking male, with PMH of head and neck cancer s/p left sided facial surgery for tumor resection (2007), Esophageal cancer with esophageal stricture s/p esophageal stent (with Dr. Herring) who received concurrent chemo/XRT with cisplatin/5 flurouracil with his last chemo therapy treatment being 1 month ago with Dr. Johnson. Pt Presented to [facility] ED on 11/12 with 2 days pain in his chest with/ swallowing liquids and solids and regurgitation. CT scan for evaluation showed narrowing of esophagus superiorly to the stent as well as concern for progression of disease with possible lung metastasis.    11/15 pt underwent EGD yesterday with GI, found 5mm in diameter stenosis of esophageal stent, without any obstruction or debris noted and per GI has been advanced to a pureed with honey thickened diet.  Intact esophageal stent. Luminal narrowing.  No tumor ingrowth or overgrowth.  Needs to stay on a full liquid diet qith ensure supplements.  No solid food as lumen is too narrow to accomodate solid food.  OK fo DC from GI POV.  GI office follow up with Dr Lopez in 2 weeks. Also found to have CT chest consistent with new b/l Lung nodules with concern for metastasis  From a thoracic standpoint there is no immediate intervention to be done at this time, would recommend possible IR Bx if able. Discussed with DR. johnson oncology and patient can be discharged and needs to follow up with pcp, gi and ct surgery as an outpatient. Nutritionist recommended ensure enlive tid.      Vital Signs Last 24 Hrs  T(C): 36.8 (14 Nov 2018 08:01), Max: 37.3 (13 Nov 2018 22:42)  T(F): 98.2 (14 Nov 2018 08:01), Max: 99.1 (13 Nov 2018 22:42)  HR: 55 (14 Nov 2018 08:01) (53 - 57)  BP: 95/56 (14 Nov 2018 08:01) (95/56 - 146/67)  BP(mean): --  RR: 18 (14 Nov 2018 08:01) (18 - 18)  SpO2: 97% (14 Nov 2018 08:01) (97% - 99%)    PHYSICAL EXAM:    GENERAL: NAD, cachetic,   HEENT: PERRL, +EOMI  CHEST/LUNG: Clear to auscultation bilaterally; No wheezing  HEART: S1S2+, Regular rate and rhythm; No murmurs  ABDOMEN: Soft, thin   EXTREMITIES:   muscle wasting No edema  SKIN: No rashes or lesions  NEURO: AAOX3, no focal deficits,  PSYCH: normal mood      I spent 35 min in discharging the patient.

## 2018-11-15 NOTE — DIETITIAN INITIAL EVALUATION ADULT. - PHYSICAL APPEARANCE
BMI 16.9; NFPE severe wasting (temples, clavicles) moderate fat loss (buccals, orbitals); edentulous

## 2018-11-15 NOTE — DIETITIAN INITIAL EVALUATION ADULT. - SIGNS/SYMPTOMS
as evidenced by severe muscle wasting,moderate fat loss, acute dec PO consuming<50% est needs >5days

## 2018-11-15 NOTE — DISCHARGE NOTE ADULT - CARE PROVIDER_API CALL
Herlinda Delacruz), Hematology; Medical Oncology  [address]  [address]  [place] [address]  Phone: [phone]  Fax: [phone]    Jose Lopez), Gastroenterology; Internal Medicine  [address] [place] [address]  Phone: [phone]  Fax: [phone]    Trell Abebe), Surgery; Thoracic Surgery  [address]  [address] [place] [address]  Phone: [phone]  Fax: [phone]

## 2018-11-15 NOTE — PROGRESS NOTE ADULT - ASSESSMENT
74 y/o Persian speaking male, with PMH of head and neck cancer s/p left sided facial surgery for tumor resection (2007), Esophageal cancer with esophageal stricture s/p esophageal stent (with Dr. Herring) who received concurrent chemo/XRT with cisplatin/5 flurouracil with his last chemo therapy treatment being 1 month ago with Dr. Delacruz. Pt Presented to [facility] ED on 11/12 with 2 days pain in his chest with/ swallowing liquids and solids and regurgitation. CT scan for evaluation showed narrowing of esophagus superiorly to the stent as well as concern for progression of disease with possible lung metastasis.    11/15 pt underwent EGD yesterday with GI, found 5mm in diameter stenosis of esophageal stent, without any obstruction or debris noted and per GI has been advanced to a pureed with honey thickened diet.

## 2018-11-15 NOTE — PROGRESS NOTE ADULT - PROBLEM SELECTOR PLAN 2
GI recommending liquid diet  Patient to follow up with his GI Dr. John ANTOINE for the future?-  not sure if patient would be a candidate, nor did patient seem receptive to the idea.

## 2018-11-16 VITALS
OXYGEN SATURATION: 98 % | DIASTOLIC BLOOD PRESSURE: 62 MMHG | HEART RATE: 60 BPM | RESPIRATION RATE: 18 BRPM | TEMPERATURE: 98 F | SYSTOLIC BLOOD PRESSURE: 112 MMHG

## 2018-11-16 PROCEDURE — 83735 ASSAY OF MAGNESIUM: CPT

## 2018-11-16 PROCEDURE — 99285 EMERGENCY DEPT VISIT HI MDM: CPT

## 2018-11-16 PROCEDURE — 85730 THROMBOPLASTIN TIME PARTIAL: CPT

## 2018-11-16 PROCEDURE — T1013: CPT

## 2018-11-16 PROCEDURE — 36415 COLL VENOUS BLD VENIPUNCTURE: CPT

## 2018-11-16 PROCEDURE — 74177 CT ABD & PELVIS W/CONTRAST: CPT

## 2018-11-16 PROCEDURE — 93005 ELECTROCARDIOGRAM TRACING: CPT

## 2018-11-16 PROCEDURE — 80053 COMPREHEN METABOLIC PANEL: CPT

## 2018-11-16 PROCEDURE — 85610 PROTHROMBIN TIME: CPT

## 2018-11-16 PROCEDURE — 85027 COMPLETE CBC AUTOMATED: CPT

## 2018-11-16 PROCEDURE — 71260 CT THORAX DX C+: CPT

## 2018-11-16 PROCEDURE — 80048 BASIC METABOLIC PNL TOTAL CA: CPT

## 2018-11-16 PROCEDURE — 70491 CT SOFT TISSUE NECK W/DYE: CPT

## 2018-11-16 PROCEDURE — 99239 HOSP IP/OBS DSCHRG MGMT >30: CPT

## 2018-11-16 RX ADMIN — ENOXAPARIN SODIUM 40 MILLIGRAM(S): 100 INJECTION SUBCUTANEOUS at 05:03

## 2018-11-16 NOTE — PROGRESS NOTE ADULT - SUBJECTIVE AND OBJECTIVE BOX
THO TURNER    [id number]    73y      Male    CC: dysphagia    INTERVAL HPI/OVERNIGHT EVENTS:  was seen and examined at bedside. Denied active complains. Was able to tolerate PO full liquid diet. was supposed to go yesterday but didn't went yesterday due to weather (snowing OUTSIDE).    REVIEW OF SYSTEMS:    CONSTITUTIONAL: No fever, weight loss, or fatigue  RESPIRATORY: No cough, wheezing, hemoptysis; No shortness of breath  CARDIOVASCULAR: No chest pain, palpitations  GASTROINTESTINAL: No abdominal or epigastric pain. No nausea, vomiting  NEUROLOGICAL: No headaches, memory loss, loss of strength.  MISCELLANEOUS:      Vital Signs Last 24 Hrs  T(C): 36.8 (16 Nov 2018 07:56), Max: 36.9 (15 Nov 2018 23:29)  T(F): 98.2 (16 Nov 2018 07:56), Max: 98.5 (15 Nov 2018 23:29)  HR: 62 (16 Nov 2018 07:56) (58 - 62)  BP: 117/59 (16 Nov 2018 07:56) (105/60 - 129/63)  BP(mean): --  RR: 19 (16 Nov 2018 07:56) (18 - 19)  SpO2: 94% (15 Nov 2018 23:29) (94% - 98%)    PHYSICAL EXAM:    PHYSICAL EXAM:    GENERAL: NAD, cachetic,   HEENT: PERRL, +EOMI  CHEST/LUNG: Clear to auscultation bilaterally; No wheezing  HEART: S1S2+, Regular rate and rhythm; No murmurs  ABDOMEN: Soft, thin   EXTREMITIES:   muscle wasting No edema  SKIN: No rashes or lesions  NEURO: AAOX3, no focal deficits,  PSYCH: normal mood        MEDICATIONS  (STANDING):  dextrose 5% + sodium chloride 0.9%. 1000 milliLiter(s) (125 mL/Hr) IV Continuous <Continuous>  enoxaparin Injectable 40 milliGRAM(s) SubCutaneous every 24 hours  levothyroxine Injectable 12.5 MICROGram(s) IV Push at bedtime  pantoprazole  Injectable 40 milliGRAM(s) IV Push daily    MEDICATIONS  (PRN):  morphine  - Injectable 2 milliGRAM(s) IV Push every 4 hours PRN Moderate Pain (4 - 6)  ondansetron Injectable 4 milliGRAM(s) IV Push every 6 hours PRN Nausea      RADIOLOGY & ADDITIONAL TESTS:

## 2018-11-16 NOTE — PROGRESS NOTE ADULT - ASSESSMENT
74 y/o Polish speaking male, with PMH of head and neck cancer s/p left sided facial surgery for tumor resection (2007), Esophageal cancer with esophageal stricture s/p esophageal stent (with Dr. Herring) who received concurrent chemo/XRT with cisplatin/5 flurouracil with his last chemo therapy treatment being 1 month ago with Dr. Johnson. Pt Presented to [facility] ED on 11/12 with 2 days pain in his chest with/ swallowing liquids and solids and regurgitation. CT scan for evaluation showed narrowing of esophagus superiorly to the stent as well as concern for progression of disease with possible lung metastasis.    11/15 pt underwent EGD with GI, found 5mm in diameter stenosis of esophageal stent, without any obstruction or debris noted and per GI has been advanced to a pureed with honey thickened diet.  Intact esophageal stent. Luminal narrowing.  No tumor ingrowth or overgrowth.  Needs to stay on a full liquid diet qith ensure supplements.  No solid food as lumen is too narrow to accomodate solid food.  OK fo DC from GI POV.  GI office follow up with Dr Lopez in 2 weeks. Also found to have CT chest consistent with new b/l Lung nodules with concern for metastasis  From a thoracic standpoint there is no immediate intervention to be done at this time, would recommend possible IR Bx if able. Discussed with DR. johnson oncology and patient can be discharged and needs to follow up with pcp, gi and ct surgery as an outpatient. Nutritionist recommended ensure enlive tid.    plan is to dc today

## 2018-11-16 NOTE — CHART NOTE - NSCHARTNOTEFT_GEN_A_CORE
THO TURNER    [id number]    73y      Male    CC: dysphagia    INTERVAL HPI/OVERNIGHT EVENTS:  was seen and examined at bedside. Denied active complains. Was able to tolerate PO full liquid diet.     REVIEW OF SYSTEMS:    CONSTITUTIONAL: No fever, weight loss, or fatigue  RESPIRATORY: No cough, wheezing, hemoptysis; No shortness of breath  CARDIOVASCULAR: No chest pain, palpitations  GASTROINTESTINAL: No abdominal or epigastric pain. No nausea, vomiting  NEUROLOGICAL: No headaches, memory loss, loss of strength.  MISCELLANEOUS:      Vital Signs Last 24 Hrs  T(C): 36.8 (16 Nov 2018 07:56), Max: 36.9 (15 Nov 2018 23:29)  T(F): 98.2 (16 Nov 2018 07:56), Max: 98.5 (15 Nov 2018 23:29)  HR: 62 (16 Nov 2018 07:56) (58 - 62)  BP: 117/59 (16 Nov 2018 07:56) (105/60 - 129/63)  BP(mean): --  RR: 19 (16 Nov 2018 07:56) (18 - 19)  SpO2: 94% (15 Nov 2018 23:29) (94% - 98%)    PHYSICAL EXAM:    PHYSICAL EXAM:    GENERAL: NAD, cachetic,   HEENT: PERRL, +EOMI  CHEST/LUNG: Clear to auscultation bilaterally; No wheezing  HEART: S1S2+, Regular rate and rhythm; No murmurs  ABDOMEN: Soft, thin   EXTREMITIES:   muscle wasting No edema  SKIN: No rashes or lesions  NEURO: AAOX3, no focal deficits,  PSYCH: normal mood        MEDICATIONS  (STANDING):  dextrose 5% + sodium chloride 0.9%. 1000 milliLiter(s) (125 mL/Hr) IV Continuous <Continuous>  enoxaparin Injectable 40 milliGRAM(s) SubCutaneous every 24 hours  levothyroxine Injectable 12.5 MICROGram(s) IV Push at bedtime  pantoprazole  Injectable 40 milliGRAM(s) IV Push daily    MEDICATIONS  (PRN):  morphine  - Injectable 2 milliGRAM(s) IV Push every 4 hours PRN Moderate Pain (4 - 6)  ondansetron Injectable 4 milliGRAM(s) IV Push every 6 hours PRN Nausea      RADIOLOGY & ADDITIONAL TESTS:      Assessment and Plan:   · Assessment	    74 y/o Botswanan speaking male, with PMH of head and neck cancer s/p left sided facial surgery for tumor resection (2007), Esophageal cancer with esophageal stricture s/p esophageal stent (with Dr. Herring) who received concurrent chemo/XRT with cisplatin/5 flurouracil with his last chemo therapy treatment being 1 month ago with Dr. Delacruz. Pt Presented to [facility] ED on 11/12 with 2 days pain in his chest with/ swallowing liquids and solids and regurgitation. CT scan for evaluation showed narrowing of esophagus superiorly to the stent as well as concern for progression of disease with possible lung metastasis.    11/15 pt underwent EGD with GI, found 5mm in diameter stenosis of esophageal stent, without any obstruction or debris noted and per GI has been advanced to a pureed with honey thickened diet.  Intact esophageal stent. Luminal narrowing.  No tumor ingrowth or overgrowth.  Needs to stay on a full liquid diet qith ensure supplements.  No solid food as lumen is too narrow to accomodate solid food.  OK fo DC from GI POV.  GI office follow up with Dr Lopez in 2 weeks. Also found to have CT chest consistent with new b/l Lung nodules with concern for metastasis  From a thoracic standpoint there is no immediate intervention to be done at this time, would recommend possible IR Bx if able. Discussed with DR. delacruz oncology and patient can be discharged and needs to follow up with pcp, gi and ct surgery as an outpatient. Nutritionist recommended ensure enlive tid.    plan is to dc today. 11/15/2018

## 2018-11-16 NOTE — PROGRESS NOTE ADULT - REASON FOR ADMISSION
Unable to tolerate PO

## 2018-11-28 NOTE — DISCHARGE NOTE ADULT - DISCHARGE DATE
Patient calling regarding on 11/6/18 patient had a visit and  prescribed Diclofenac gel, per patient this medication will not be covered\" due to a bin number for government employees, (patient is not a ) on the insurance card\". Patient would like to see is there another comparable medication that can be prescribed. Please advise.   15-Nov-2018 16-Nov-2018

## 2018-12-05 PROBLEM — C15.9 MALIGNANT NEOPLASM OF ESOPHAGUS, UNSPECIFIED: Chronic | Status: ACTIVE | Noted: 2018-11-13

## 2018-12-17 ENCOUNTER — APPOINTMENT (OUTPATIENT)
Dept: GASTROENTEROLOGY | Facility: CLINIC | Age: 73
End: 2018-12-17
Payer: MEDICARE

## 2018-12-17 VITALS
DIASTOLIC BLOOD PRESSURE: 60 MMHG | HEIGHT: 62 IN | BODY MASS INDEX: 18.03 KG/M2 | HEART RATE: 62 BPM | SYSTOLIC BLOOD PRESSURE: 90 MMHG | WEIGHT: 98 LBS

## 2018-12-17 DIAGNOSIS — C15.9 MALIGNANT NEOPLASM OF ESOPHAGUS, UNSPECIFIED: ICD-10-CM

## 2018-12-17 PROCEDURE — 99214 OFFICE O/P EST MOD 30 MIN: CPT

## 2018-12-19 ENCOUNTER — APPOINTMENT (OUTPATIENT)
Dept: THORACIC SURGERY | Facility: CLINIC | Age: 73
End: 2018-12-19
Payer: MEDICARE

## 2018-12-19 ENCOUNTER — INPATIENT (INPATIENT)
Facility: HOSPITAL | Age: 73
LOS: 12 days | DRG: 374 | End: 2019-01-01
Attending: INTERNAL MEDICINE | Admitting: HOSPITALIST
Payer: MEDICARE

## 2018-12-19 VITALS
HEIGHT: 62 IN | BODY MASS INDEX: 18.22 KG/M2 | DIASTOLIC BLOOD PRESSURE: 58 MMHG | RESPIRATION RATE: 16 BRPM | HEART RATE: 60 BPM | SYSTOLIC BLOOD PRESSURE: 106 MMHG | WEIGHT: 99 LBS

## 2018-12-19 VITALS
WEIGHT: 154.98 LBS | HEART RATE: 87 BPM | DIASTOLIC BLOOD PRESSURE: 61 MMHG | RESPIRATION RATE: 20 BRPM | SYSTOLIC BLOOD PRESSURE: 108 MMHG | HEIGHT: 67 IN | TEMPERATURE: 98 F | OXYGEN SATURATION: 97 %

## 2018-12-19 DIAGNOSIS — Z85.89 PERSONAL HISTORY OF MALIGNANT NEOPLASM OF OTHER ORGANS AND SYSTEMS: Chronic | ICD-10-CM

## 2018-12-19 PROCEDURE — 99285 EMERGENCY DEPT VISIT HI MDM: CPT

## 2018-12-19 PROCEDURE — 99213 OFFICE O/P EST LOW 20 MIN: CPT

## 2018-12-19 PROCEDURE — 93010 ELECTROCARDIOGRAM REPORT: CPT

## 2018-12-19 PROCEDURE — 71046 X-RAY EXAM CHEST 2 VIEWS: CPT | Mod: 26

## 2018-12-19 NOTE — ED PROVIDER NOTE - MEDICAL DECISION MAKING DETAILS
73M with productive cough, sob, pleuritic chest pain x 1 day. Likely PNA vs URI. Will get labs, troponin, bnp, ekg, cxr. Reassess.

## 2018-12-19 NOTE — ED PROVIDER NOTE - PROGRESS NOTE DETAILS
Shady: patient attempted to swallow water and aspirated. still having difficulty swallowing. patient does not feel comfortable going home. will get patient admitted.

## 2018-12-19 NOTE — ED PROVIDER NOTE - CARE PLAN
Principal Discharge DX:	Dysphagia  Secondary Diagnosis:	Esophageal stricture  Secondary Diagnosis:	Metastatic cancer

## 2018-12-19 NOTE — ED PROVIDER NOTE - CARDIAC, MLM
Normal rate, regular rhythm.  Heart sounds S1, S2.  No murmurs, rubs or gallops. Mild tenderness to palpation of sternum.

## 2018-12-19 NOTE — ED PROVIDER NOTE - OBJECTIVE STATEMENT
73M with PMHx esophageal cancer, HTN, HLD presents with shortness breath with associated productive cough that began today. Patient reports the cough is productive of yellowish sputum. He reports associated chest pain in his sternum when he coughs. Denies radiation of pain, aggravating/alleviating factors, associated numbness/tingling, diaphoresis, nausea/vomiting. Patient denies fevers/chills, headache, dizziness, light headedness, abdominal pain, nausea/vomiting, leg pain/swelling, hx of blood clots. HPI obtained with assistance from .

## 2018-12-19 NOTE — ED PROVIDER NOTE - ATTENDING CONTRIBUTION TO CARE
74 yo M presents to ED  c/o increased cough with prod yellow sputum with assoc SOB and pleuritic chest pain.  Pt with hx of esophageal Ca s/p radical head and neck surgery with tongue resection and difficult to understand at times.  Pt awake and alert in no acute distress. Cor Reg, Lungs with coarse BS b/l, Abd soft, NT, BS_, Ext no edema or cyanosis, Neuro intact.  Will check labs, CXR, EKG and re-eval

## 2018-12-19 NOTE — ED PROVIDER NOTE - ENMT, MLM
Airway patent, Nasal mucosa clear. Mouth with normal mucosa. Throat has no vesicles, no oropharyngeal exudates and uvula is midline. Airway patent, Nasal mucosa clear. Mouth with normal mucosa. Patient missing anterior tongue. Throat has no vesicles, no oropharyngeal exudates and uvula is midline.

## 2018-12-20 DIAGNOSIS — R13.10 DYSPHAGIA, UNSPECIFIED: ICD-10-CM

## 2018-12-20 LAB
ALBUMIN SERPL ELPH-MCNC: 3.2 G/DL — LOW (ref 3.3–5.2)
ALP SERPL-CCNC: 58 U/L — SIGNIFICANT CHANGE UP (ref 40–120)
ALT FLD-CCNC: 9 U/L — SIGNIFICANT CHANGE UP
ANION GAP SERPL CALC-SCNC: 11 MMOL/L — SIGNIFICANT CHANGE UP (ref 5–17)
AST SERPL-CCNC: 20 U/L — SIGNIFICANT CHANGE UP
BASOPHILS # BLD AUTO: 0 K/UL — SIGNIFICANT CHANGE UP (ref 0–0.2)
BASOPHILS NFR BLD AUTO: 0.1 % — SIGNIFICANT CHANGE UP (ref 0–2)
BILIRUB SERPL-MCNC: 0.3 MG/DL — LOW (ref 0.4–2)
BUN SERPL-MCNC: 19 MG/DL — SIGNIFICANT CHANGE UP (ref 8–20)
CALCIUM SERPL-MCNC: 9.1 MG/DL — SIGNIFICANT CHANGE UP (ref 8.6–10.2)
CHLORIDE SERPL-SCNC: 98 MMOL/L — SIGNIFICANT CHANGE UP (ref 98–107)
CO2 SERPL-SCNC: 30 MMOL/L — HIGH (ref 22–29)
CREAT SERPL-MCNC: 0.79 MG/DL — SIGNIFICANT CHANGE UP (ref 0.5–1.3)
EOSINOPHIL # BLD AUTO: 0 K/UL — SIGNIFICANT CHANGE UP (ref 0–0.5)
EOSINOPHIL NFR BLD AUTO: 0.1 % — SIGNIFICANT CHANGE UP (ref 0–6)
GLUCOSE SERPL-MCNC: 93 MG/DL — SIGNIFICANT CHANGE UP (ref 70–115)
HCT VFR BLD CALC: 27.5 % — LOW (ref 42–52)
HGB BLD-MCNC: 8.4 G/DL — LOW (ref 14–18)
LYMPHOCYTES # BLD AUTO: 0.6 K/UL — LOW (ref 1–4.8)
LYMPHOCYTES # BLD AUTO: 4.7 % — LOW (ref 20–55)
MCHC RBC-ENTMCNC: 28.5 PG — SIGNIFICANT CHANGE UP (ref 27–31)
MCHC RBC-ENTMCNC: 30.5 G/DL — LOW (ref 32–36)
MCV RBC AUTO: 93.2 FL — SIGNIFICANT CHANGE UP (ref 80–94)
MONOCYTES # BLD AUTO: 0.5 K/UL — SIGNIFICANT CHANGE UP (ref 0–0.8)
MONOCYTES NFR BLD AUTO: 4 % — SIGNIFICANT CHANGE UP (ref 3–10)
NEUTROPHILS # BLD AUTO: 11.2 K/UL — HIGH (ref 1.8–8)
NEUTROPHILS NFR BLD AUTO: 90.8 % — HIGH (ref 37–73)
NT-PROBNP SERPL-SCNC: 761 PG/ML — HIGH (ref 0–300)
PLATELET # BLD AUTO: 331 K/UL — SIGNIFICANT CHANGE UP (ref 150–400)
POTASSIUM SERPL-MCNC: 4.2 MMOL/L — SIGNIFICANT CHANGE UP (ref 3.5–5.3)
POTASSIUM SERPL-SCNC: 4.2 MMOL/L — SIGNIFICANT CHANGE UP (ref 3.5–5.3)
PROCALCITONIN SERPL-MCNC: 4.75 NG/ML — HIGH (ref 0–0.04)
PROT SERPL-MCNC: 7.4 G/DL — SIGNIFICANT CHANGE UP (ref 6.6–8.7)
RAPID RVP RESULT: DETECTED
RBC # BLD: 2.95 M/UL — LOW (ref 4.6–6.2)
RBC # FLD: 16.3 % — HIGH (ref 11–15.6)
RV+EV RNA SPEC QL NAA+PROBE: DETECTED
SODIUM SERPL-SCNC: 139 MMOL/L — SIGNIFICANT CHANGE UP (ref 135–145)
TROPONIN T SERPL-MCNC: <0.01 NG/ML — SIGNIFICANT CHANGE UP (ref 0–0.06)
WBC # BLD: 12.3 K/UL — HIGH (ref 4.8–10.8)
WBC # FLD AUTO: 12.3 K/UL — HIGH (ref 4.8–10.8)

## 2018-12-20 PROCEDURE — 99223 1ST HOSP IP/OBS HIGH 75: CPT

## 2018-12-20 PROCEDURE — 71275 CT ANGIOGRAPHY CHEST: CPT | Mod: 26

## 2018-12-20 RX ORDER — ENOXAPARIN SODIUM 100 MG/ML
40 INJECTION SUBCUTANEOUS DAILY
Qty: 0 | Refills: 0 | Status: DISCONTINUED | OUTPATIENT
Start: 2018-12-20 | End: 2018-12-25

## 2018-12-20 RX ORDER — AMLODIPINE BESYLATE 2.5 MG/1
5 TABLET ORAL DAILY
Qty: 0 | Refills: 0 | Status: DISCONTINUED | OUTPATIENT
Start: 2018-12-20 | End: 2018-12-24

## 2018-12-20 RX ORDER — IPRATROPIUM/ALBUTEROL SULFATE 18-103MCG
3 AEROSOL WITH ADAPTER (GRAM) INHALATION EVERY 6 HOURS
Qty: 0 | Refills: 0 | Status: DISCONTINUED | OUTPATIENT
Start: 2018-12-20 | End: 2018-12-24

## 2018-12-20 RX ORDER — LEVOTHYROXINE SODIUM 125 MCG
25 TABLET ORAL DAILY
Qty: 0 | Refills: 0 | Status: DISCONTINUED | OUTPATIENT
Start: 2018-12-20 | End: 2018-12-24

## 2018-12-20 RX ORDER — PANTOPRAZOLE SODIUM 20 MG/1
40 TABLET, DELAYED RELEASE ORAL
Qty: 0 | Refills: 0 | Status: DISCONTINUED | OUTPATIENT
Start: 2018-12-20 | End: 2018-12-24

## 2018-12-20 RX ORDER — SODIUM CHLORIDE 9 MG/ML
1000 INJECTION INTRAMUSCULAR; INTRAVENOUS; SUBCUTANEOUS
Qty: 0 | Refills: 0 | Status: DISCONTINUED | OUTPATIENT
Start: 2018-12-20 | End: 2018-12-21

## 2018-12-20 RX ORDER — PIPERACILLIN AND TAZOBACTAM 4; .5 G/20ML; G/20ML
3.38 INJECTION, POWDER, LYOPHILIZED, FOR SOLUTION INTRAVENOUS EVERY 8 HOURS
Qty: 0 | Refills: 0 | Status: DISCONTINUED | OUTPATIENT
Start: 2018-12-20 | End: 2018-12-27

## 2018-12-20 RX ORDER — ASPIRIN/CALCIUM CARB/MAGNESIUM 324 MG
81 TABLET ORAL DAILY
Qty: 0 | Refills: 0 | Status: DISCONTINUED | OUTPATIENT
Start: 2018-12-20 | End: 2018-12-24

## 2018-12-20 RX ORDER — SIMVASTATIN 20 MG/1
10 TABLET, FILM COATED ORAL AT BEDTIME
Qty: 0 | Refills: 0 | Status: DISCONTINUED | OUTPATIENT
Start: 2018-12-20 | End: 2018-12-24

## 2018-12-20 RX ADMIN — Medication 3 MILLILITER(S): at 17:15

## 2018-12-20 RX ADMIN — SODIUM CHLORIDE 75 MILLILITER(S): 9 INJECTION INTRAMUSCULAR; INTRAVENOUS; SUBCUTANEOUS at 23:01

## 2018-12-20 RX ADMIN — PIPERACILLIN AND TAZOBACTAM 25 GRAM(S): 4; .5 INJECTION, POWDER, LYOPHILIZED, FOR SOLUTION INTRAVENOUS at 17:14

## 2018-12-20 RX ADMIN — Medication 3 MILLILITER(S): at 15:59

## 2018-12-20 RX ADMIN — PIPERACILLIN AND TAZOBACTAM 25 GRAM(S): 4; .5 INJECTION, POWDER, LYOPHILIZED, FOR SOLUTION INTRAVENOUS at 23:01

## 2018-12-20 NOTE — ED ADULT NURSE REASSESSMENT NOTE - NS ED NURSE REASSESS COMMENT FT1
Attempted to give report to Obdulia SILVA states she will call back in 10 minutes
Patient failed dysphagia screening at 90ml noted by coughing and wet voice will update Dr Chacon
Spoke to Dr Chacon will attempt dysphagia screen
Patient rec'd in bed AOx3 plan of care reinforced patient placed on oxygen see vital signs; will continue to assess

## 2018-12-20 NOTE — H&P ADULT - ASSESSMENT
74 y/o Turkish speaking male, with PMH of head and neck cancer s/p left sided facial surgery for tumor resection (2007), Esophageal cancer with esophageal stricture s/p esophageal stent (with Dr. Herring) who received concurrent chemo/XRT with cisplatin/5 flurouracil with his last chemo therapy treatment being 1 month ago with Dr. Delacruz. Pt Presented to [facility] ED on 11/12 with 2 days pain in his chest with/ swallowing liquids and solids and regurgitation. CT scan for evaluation showed narrowing of esophagus superiorly to the stent as well as concern for progression of disease with possible lung metastasis. On 11/15 pt underwent EGD with GI, found 5mm in diameter stenosis of esophageal stent, without any obstruction or debris noted and per GI has been advanced to a pureed with honey thickened diet. Intact esophageal stent. Luminal narrowing.  No tumor ingrowth or overgrowth.  Needs to stay on a full liquid diet qith ensure supplements.  No solid food as lumen is too narrow to accommodate solid food. Pt was asked to f/u with GI  as outpatient. Also found to have CT chest consistent with new b/l Lung nodules with concern for metastasis and was recommended to f/u with CTS as outpatient for possible biopsy. Pt was discharged from [facility] at 11/16/18  Today pt returns to ED with similar complaints of SOB, productive cough & dysphagia that began today. Patient reports the cough is productive of yellowish sputum. He reports associated chest pain in his sternum when he coughs. Denies radiation of pain, aggravating/alleviating factors, associated numbness/tingling, diaphoresis, nausea/vomiting. Patient denies fevers/chills, headache, dizziness, light headedness, abdominal pain, nausea/vomiting, leg pain/swelling, hx of blood clots. CTA: No pulmonary embolus.Interval increase in size of numerous bilateral metastatic nodules, some of which are cavitary.  Superimposed tree-in-bud nodular airspace opacities, predominantly within the left upper lobe, which may represent superimposed infection. RVP +ve for rhinovirus. Pt failed dysphagia screen in the ED when he started choking & coughing on a small amount of thin liquids      SOB & cough from combination of viral syndrome with possible post obs PNA & worsening of lung mets -  Admit  Supportive care  IVF  Duonebs  O2 prn  CTA with  No pulmonary embolus.Interval increase in size of numerous bilateral metastatic nodules, some of which are cavitary.  Superimposed tree-in-bud nodular airspace opacities, predominantly within the left upper lobe, which may represent superimposed infection.   Will start IV zosyn for possible post obs PNA  Blood cx, sputum cx, UA, Ucx  Procal  RVP +ve for rhinovirus.         Worsening Dysphagia likely from Esophageal stricture from progression of esophageal cancer  -->GI called  Failed dysphagia screen, Continue to keep NPO         Malignant neoplasm of esophagus with likely mets to lung  --> hem onc consult called  --> ct surgery consult called  --> for eventual lung biopsy?    Hypothyroidism, unspecified type.  synthroid      hypertension --> stable     Protein calorie malnutrition.  Plan: nutrition cx.     DVT ppx    prognosis poor  palliative consult called

## 2018-12-20 NOTE — H&P ADULT - HISTORY OF PRESENT ILLNESS
74 y/o Portuguese speaking male, with PMH of head and neck cancer s/p left sided facial surgery for tumor resection (2007), Esophageal cancer with esophageal stricture s/p esophageal stent (with Dr. Herring) who received concurrent chemo/XRT with cisplatin/5 flurouracil with his last chemo therapy treatment being 1 month ago with Dr. Delacruz. Pt Presented to [facility] ED on 11/12 with 2 days pain in his chest with/ swallowing liquids and solids and regurgitation. CT scan for evaluation showed narrowing of esophagus superiorly to the stent as well as concern for progression of disease with possible lung metastasis. On 11/15 pt underwent EGD with GI, found 5mm in diameter stenosis of esophageal stent, without any obstruction or debris noted and per GI has been advanced to a pureed with honey thickened diet. Intact esophageal stent. Luminal narrowing.  No tumor ingrowth or overgrowth.  Needs to stay on a full liquid diet qith ensure supplements.  No solid food as lumen is too narrow to accommodate solid food. Pt was asked to f/u with GI  as outpatient. Also found to have CT chest consistent with new b/l Lung nodules with concern for metastasis and was recommended to f/u with CTS as outpatient for possible biopsy. Pt was discharged from [facility] at 11/16/18  Today pt returns to ED with similar complaints of SOB, productive cough & dysphagia that began today. Patient reports the cough is productive of yellowish sputum. He reports associated chest pain in his sternum when he coughs. Denies radiation of pain, aggravating/alleviating factors, associated numbness/tingling, diaphoresis, nausea/vomiting. Patient denies fevers/chills, headache, dizziness, light headedness, abdominal pain, nausea/vomiting, leg pain/swelling, hx of blood clots. CTA: No pulmonary embolus.Interval increase in size of numerous bilateral metastatic nodules, some of which are cavitary.  Superimposed tree-in-bud nodular airspace opacities, predominantly within the left upper lobe, which may represent superimposed infection. RVP +ve for rhinovirus. Pt failed dysphagia screen in the ED when he started choking & coughing on a small amount of thin liquids

## 2018-12-20 NOTE — H&P ADULT - NSHPPHYSICALEXAM_GEN_ALL_CORE
GENERAL: NAD, facial asymmetry from head & neck surgery  HEAD:  Atraumatic, Normocephalic  EYES: EOMI, PERRLA, conjunctiva and sclera clear  NECK: Supple, No JVD  NERVOUS SYSTEM:  Alert & Oriented X3, Motor Strength 5/5 B/L upper and lower extremities; DTRs 2+ intact and symmetric  CHEST/LUNG: b/l rhonchi  HEART: Regular rate and rhythm; No murmurs, rubs, or gallops  ABDOMEN: Soft, scaphoid abdomen, Nontender, Nondistended; Bowel sounds present  EXTREMITIES:  2+ Peripheral Pulses, No clubbing, cyanosis, or edema  SKIN: No rashes or lesions

## 2018-12-21 DIAGNOSIS — Z71.89 OTHER SPECIFIED COUNSELING: ICD-10-CM

## 2018-12-21 DIAGNOSIS — E43 UNSPECIFIED SEVERE PROTEIN-CALORIE MALNUTRITION: ICD-10-CM

## 2018-12-21 DIAGNOSIS — J12.9 VIRAL PNEUMONIA, UNSPECIFIED: ICD-10-CM

## 2018-12-21 DIAGNOSIS — R13.10 DYSPHAGIA, UNSPECIFIED: ICD-10-CM

## 2018-12-21 DIAGNOSIS — C15.9 MALIGNANT NEOPLASM OF ESOPHAGUS, UNSPECIFIED: ICD-10-CM

## 2018-12-21 LAB
ANION GAP SERPL CALC-SCNC: 12 MMOL/L — SIGNIFICANT CHANGE UP (ref 5–17)
APPEARANCE UR: CLEAR — SIGNIFICANT CHANGE UP
BACTERIA # UR AUTO: ABNORMAL
BASOPHILS # BLD AUTO: 0 K/UL — SIGNIFICANT CHANGE UP (ref 0–0.2)
BASOPHILS NFR BLD AUTO: 0.2 % — SIGNIFICANT CHANGE UP (ref 0–2)
BILIRUB UR-MCNC: NEGATIVE — SIGNIFICANT CHANGE UP
BUN SERPL-MCNC: 22 MG/DL — HIGH (ref 8–20)
CALCIUM SERPL-MCNC: 8.9 MG/DL — SIGNIFICANT CHANGE UP (ref 8.6–10.2)
CHLORIDE SERPL-SCNC: 98 MMOL/L — SIGNIFICANT CHANGE UP (ref 98–107)
CO2 SERPL-SCNC: 29 MMOL/L — SIGNIFICANT CHANGE UP (ref 22–29)
COLOR SPEC: YELLOW — SIGNIFICANT CHANGE UP
CREAT SERPL-MCNC: 0.87 MG/DL — SIGNIFICANT CHANGE UP (ref 0.5–1.3)
DIFF PNL FLD: ABNORMAL
EOSINOPHIL # BLD AUTO: 0.2 K/UL — SIGNIFICANT CHANGE UP (ref 0–0.5)
EOSINOPHIL NFR BLD AUTO: 1.4 % — SIGNIFICANT CHANGE UP (ref 0–5)
GLUCOSE SERPL-MCNC: 101 MG/DL — SIGNIFICANT CHANGE UP (ref 70–115)
GLUCOSE UR QL: NEGATIVE MG/DL — SIGNIFICANT CHANGE UP
GRAM STN FLD: SIGNIFICANT CHANGE UP
GRAN CASTS # UR COMP ASSIST: ABNORMAL /LPF
HCT VFR BLD CALC: 25.6 % — LOW (ref 42–52)
HGB BLD-MCNC: 7.8 G/DL — LOW (ref 14–18)
KETONES UR-MCNC: NEGATIVE — SIGNIFICANT CHANGE UP
LEUKOCYTE ESTERASE UR-ACNC: NEGATIVE — SIGNIFICANT CHANGE UP
LYMPHOCYTES # BLD AUTO: 0.6 K/UL — LOW (ref 1–4.8)
LYMPHOCYTES # BLD AUTO: 5.1 % — LOW (ref 20–55)
MAGNESIUM SERPL-MCNC: 2 MG/DL — SIGNIFICANT CHANGE UP (ref 1.6–2.6)
MCHC RBC-ENTMCNC: 28.4 PG — SIGNIFICANT CHANGE UP (ref 27–31)
MCHC RBC-ENTMCNC: 30.5 G/DL — LOW (ref 32–36)
MCV RBC AUTO: 93.1 FL — SIGNIFICANT CHANGE UP (ref 80–94)
MONOCYTES # BLD AUTO: 0.5 K/UL — SIGNIFICANT CHANGE UP (ref 0–0.8)
MONOCYTES NFR BLD AUTO: 3.9 % — SIGNIFICANT CHANGE UP (ref 3–10)
NEUTROPHILS # BLD AUTO: 10.9 K/UL — HIGH (ref 1.8–8)
NEUTROPHILS NFR BLD AUTO: 89.1 % — HIGH (ref 37–73)
NITRITE UR-MCNC: NEGATIVE — SIGNIFICANT CHANGE UP
PH UR: 5 — SIGNIFICANT CHANGE UP (ref 5–8)
PHOSPHATE SERPL-MCNC: 3.3 MG/DL — SIGNIFICANT CHANGE UP (ref 2.4–4.7)
PLATELET # BLD AUTO: 325 K/UL — SIGNIFICANT CHANGE UP (ref 150–400)
POTASSIUM SERPL-MCNC: 4.1 MMOL/L — SIGNIFICANT CHANGE UP (ref 3.5–5.3)
POTASSIUM SERPL-SCNC: 4.1 MMOL/L — SIGNIFICANT CHANGE UP (ref 3.5–5.3)
PROT UR-MCNC: 30 MG/DL
RBC # BLD: 2.75 M/UL — LOW (ref 4.6–6.2)
RBC # FLD: 16.6 % — HIGH (ref 11–15.6)
RBC CASTS # UR COMP ASSIST: SIGNIFICANT CHANGE UP /HPF (ref 0–4)
SODIUM SERPL-SCNC: 139 MMOL/L — SIGNIFICANT CHANGE UP (ref 135–145)
SP GR SPEC: 1.02 — SIGNIFICANT CHANGE UP (ref 1.01–1.02)
SPECIMEN SOURCE: SIGNIFICANT CHANGE UP
UROBILINOGEN FLD QL: NEGATIVE MG/DL — SIGNIFICANT CHANGE UP
WBC # BLD: 12.2 K/UL — HIGH (ref 4.8–10.8)
WBC # FLD AUTO: 12.2 K/UL — HIGH (ref 4.8–10.8)
WBC UR QL: SIGNIFICANT CHANGE UP

## 2018-12-21 PROCEDURE — 99232 SBSQ HOSP IP/OBS MODERATE 35: CPT

## 2018-12-21 PROCEDURE — 99497 ADVNCD CARE PLAN 30 MIN: CPT | Mod: 25

## 2018-12-21 PROCEDURE — 99223 1ST HOSP IP/OBS HIGH 75: CPT

## 2018-12-21 RX ORDER — SODIUM CHLORIDE 9 MG/ML
1000 INJECTION, SOLUTION INTRAVENOUS
Qty: 0 | Refills: 0 | Status: DISCONTINUED | OUTPATIENT
Start: 2018-12-21 | End: 2018-12-27

## 2018-12-21 RX ADMIN — PIPERACILLIN AND TAZOBACTAM 25 GRAM(S): 4; .5 INJECTION, POWDER, LYOPHILIZED, FOR SOLUTION INTRAVENOUS at 05:27

## 2018-12-21 RX ADMIN — PIPERACILLIN AND TAZOBACTAM 25 GRAM(S): 4; .5 INJECTION, POWDER, LYOPHILIZED, FOR SOLUTION INTRAVENOUS at 21:06

## 2018-12-21 RX ADMIN — ENOXAPARIN SODIUM 40 MILLIGRAM(S): 100 INJECTION SUBCUTANEOUS at 14:02

## 2018-12-21 RX ADMIN — Medication 3 MILLILITER(S): at 09:17

## 2018-12-21 RX ADMIN — PIPERACILLIN AND TAZOBACTAM 25 GRAM(S): 4; .5 INJECTION, POWDER, LYOPHILIZED, FOR SOLUTION INTRAVENOUS at 14:00

## 2018-12-21 RX ADMIN — SODIUM CHLORIDE 75 MILLILITER(S): 9 INJECTION, SOLUTION INTRAVENOUS at 21:05

## 2018-12-21 RX ADMIN — Medication 3 MILLILITER(S): at 15:07

## 2018-12-21 RX ADMIN — SODIUM CHLORIDE 75 MILLILITER(S): 9 INJECTION, SOLUTION INTRAVENOUS at 14:01

## 2018-12-21 NOTE — DIETITIAN INITIAL EVALUATION ADULT. - SOURCE
patient/Used  service. patient/Used  service. Pt answering questions however accuracy is questionable. EMR reviewed.

## 2018-12-21 NOTE — DIETITIAN INITIAL EVALUATION ADULT. - SIGNS/SYMPTOMS
as evidenced by poor po intake pta, 5.9% unintentional wt loss x 5 weeks, severe muscle/fat wasting.

## 2018-12-21 NOTE — PROGRESS NOTE ADULT - ASSESSMENT
Patient with advanced esophageal cancer with metastasis with a history of esophageal stent with tumor ingrowth.Will followup over the weekend for evaluation for possible EGD with a PEG tube placement. If not feasible, then recommend IR guided PEG tube placement.

## 2018-12-21 NOTE — DIETITIAN INITIAL EVALUATION ADULT. - NS AS NUTRI INTERV ENTERAL NUTRITION
As medically feasible start enteral feeds of Jevity 1.5cal at 20ml/hr, increase by 10ml/hr every 8 hours to goal rate 50ml/hr x 20 hours (1,000ml/daily) to provide 1,500kcal and 64 grams protein daily.

## 2018-12-21 NOTE — CONSULT NOTE ADULT - PROBLEM SELECTOR RECOMMENDATION 5
Using a , I had a lengthy discussion with Patient about his diagnosis disease progression, why he won't be able to eat or drink again. Decidision regarding peg placement made  SEE GOALS of CARE Document  MOLST DNR?DNI completed today and HCP

## 2018-12-21 NOTE — PROGRESS NOTE ADULT - ASSESSMENT
72 y/o Romanian speaking male, with PMH of head and neck cancer s/p left sided facial surgery for tumor resection (2007), Esophageal cancer with esophageal stricture s/p esophageal stent (with Dr. Herring) who received concurrent chemo/XRT with cisplatin/5 flurouracil with his last chemo therapy treatment being 1 month ago with Dr. Delacruz. Pt Presented to [facility] ED on 11/12 with 2 days pain in his chest with/ swallowing liquids and solids and regurgitation. CT scan for evaluation showed narrowing of esophagus superiorly to the stent as well as concern for progression of disease with possible lung metastasis. On 11/15 pt underwent EGD with GI, found 5mm in diameter stenosis of esophageal stent, without any obstruction or debris noted and per GI has been advanced to a pureed with honey thickened diet. Intact esophageal stent. Luminal narrowing.  No tumor ingrowth or overgrowth.  Needs to stay on a full liquid diet qith ensure supplements.  No solid food as lumen is too narrow to accommodate solid food. Pt was asked to f/u with GI  as outpatient. Also found to have CT chest consistent with new b/l Lung nodules with concern for metastasis and was recommended to f/u with CTS as outpatient for possible biopsy. Pt was discharged from [facility] at 11/16/18  Today pt returns to ED with similar complaints of SOB, productive cough & dysphagia that began today. Patient reports the cough is productive of yellowish sputum. He reports associated chest pain in his sternum when he coughs. Denies radiation of pain, aggravating/alleviating factors, associated numbness/tingling, diaphoresis, nausea/vomiting. Patient denies fevers/chills, headache, dizziness, light headedness, abdominal pain, nausea/vomiting, leg pain/swelling, hx of blood clots. CTA: No pulmonary embolus.Interval increase in size of numerous bilateral metastatic nodules, some of which are cavitary.  Superimposed tree-in-bud nodular airspace opacities, predominantly within the left upper lobe, which may represent superimposed infection. RVP +ve for rhinovirus. Pt failed dysphagia screen in the ED when he started choking & coughing on a small amount of thin liquids      SOB & cough from combination of viral syndrome with possible post obs PNA/aspiration PNA & worsening of lung mets, possible GNR/anerobes -  Supportive care  IVF  Duonebs  O2 prn  CTA with  No pulmonary embolus.Interval increase in size of numerous bilateral metastatic nodules, some of which are cavitary.  Superimposed tree-in-bud nodular airspace opacities, predominantly within the left upper lobe, which may represent superimposed infection.   procalcitonin 4.75, will c/w IV zosyn.  f/u Blood cx, sputum cx, Ucx. send urine legionella  RVP +ve for rhinovirus.       Worsening Dysphagia likely from Esophageal stricture from progression of esophageal cancer  -->GI called, Failed dysphagia screen, Continue to keep NPO  --> For PEG placement    Malignant neoplasm of esophagus with likely mets to lung  --> hem onc consult appreciated  --> ct surgery consult called  --> seen by hospice and palliative team. patient DNR/DNI. considering home hospice after PEG placement    Hypothyroidism, unspecified type.  synthroid    hypertension --> stable     Protein calorie malnutrition.  Plan: nutrition cx appreciated.     DVT ppx: lovenox  GI: PPI  Diet: NPO for risk of aspiration    GOC: possible home hospice after PEG    Deconditioned: PT    prognosis poor

## 2018-12-21 NOTE — PROGRESS NOTE ADULT - SUBJECTIVE AND OBJECTIVE BOX
PI: Patient is a 73y Male seen on consultation for the evaluation and management of Esophageal cancer.  Initially diagnosed in  2006  with laryngeal cancer with T4 lesion, 3 plus cervical nodes, treated with surgery and concurrent chemo/RT; finished treatment in 5/2007.  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  He has received tx with concurrent  CDDP/5-FU and RT.  Last treatment early November.  Had admission to evaluate for chest pain, difficulty swallowing.  EGD showed narrowing.  Recommendations included diet of full liquids as could not tolerate solids.  New pulmonary nodules noted on CT chest.  No biopsy recommended for lung lesion last admission.  Returns to ER c/o SOB, cough productive of yellow sputum,. pain in sternum with coughing.  Difficulty swallowing.  Noted to have progressive lung nodules. Possible superimposed infection.  Denies nausea or vomiting, fevers, chills, sweats or night sweats, headaches or dizziness.   Unable to eat only for a few days.    PAST MEDICAL & SURGICAL HISTORY:  Malignant neoplasm of esophagus, unspecified location  High cholesterol  Hypothyroid  HTN (hypertension)  Head and neck cancer: 2007  History of head and neck cancer      REVIEW OF SYSTEMS    Weakness  Unable to eat x 2-3 days  No bleeding  No SOB  No CP  CN neg   Neg  GI See HPI  No bone or joint pain  Skin neg  No enlarged LNs  Skin neg  Eyes neg    MEDICATIONS  (STANDING):  ALBUTerol/ipratropium for Nebulization 3 milliLiter(s) Nebulizer every 6 hours  amLODIPine   Tablet 5 milliGRAM(s) Oral daily  aspirin enteric coated 81 milliGRAM(s) Oral daily  enoxaparin Injectable 40 milliGRAM(s) SubCutaneous daily  levothyroxine 25 MICROGram(s) Oral daily  pantoprazole    Tablet 40 milliGRAM(s) Oral before breakfast  piperacillin/tazobactam IVPB. 3.375 Gram(s) IV Intermittent every 8 hours  simvastatin 10 milliGRAM(s) Oral at bedtime  sodium chloride 0.9%. 1000 milliLiter(s) (75 mL/Hr) IV Continuous <Continuous>          Allergies    No Known Allergies    Intolerances        SOCIAL HISTORY:    Smoking Status: Former smoker   Alcohol: Denied  Marital Status: Single  Occupation: Retired  from machine shop    FAMILY HISTORY:  Family history of stomach cancer (Sibling)    P/E: Alert and oriented  Pale  No icterus  No cervical LNs  Lungs: Diminished BS but clear  Heart: RR  Abd: Non-tender  No palpable HSM  Exts: Cachectic.      CBC Full  -  ( 21 Dec 2018 07:28 )  WBC Count : 12.2 K/uL  Hemoglobin : 7.8 g/dL  Hematocrit : 25.6 %  Platelet Count - Automated : 325 K/uL  Mean Cell Volume : 93.1 fl  Mean Cell Hemoglobin : 28.4 pg  Mean Cell Hemoglobin Concentration : 30.5 g/dL  Auto Neutrophil # : 10.9 K/uL  Auto Lymphocyte # : 0.6 K/uL  Auto Monocyte # : 0.5 K/uL  Auto Eosinophil # : 0.2 K/uL  Auto Basophil # : 0.0 K/uL  Auto Neutrophil % : 89.1 %  Auto Lymphocyte % : 5.1 %  Auto Monocyte % : 3.9 %  Auto Eosinophil % : 1.4 %  Auto Basophil % : 0.2 %    21 Dec 2018 07:28    139    |  98     |  22.0   ----------------------------<  101    4.1     |  29.0   |  0.87     Ca    8.9        21 Dec 2018 07:28  Phos  3.3       21 Dec 2018 07:28  Mg     2.0       21 Dec 2018 07:28    TPro  7.4    /  Alb  3.2    /  TBili  0.3    /  DBili  x      /  AST  20     /  ALT  9      /  AlkPhos  58     20 Dec 2018 00:25      RADIOLOGY & ADDITIONAL STUDIES:    Esophageal cancer, s/p chemo/RT; has esophageal narrowing  Pulmonary mets/ < from: CT Abdomen and Pelvis w/ IV Cont (11.12.18 @ 20:25) >     EXAM:  CT ABDOMEN AND PELVIS IC                         EXAM:  CT CHEST IC                          PROCEDURE DATE:  11/12/2018          INTERPRETATION:  Clinical Information: Esophageal cancer. Difficulty   swallowing.    Comparison: 06/20/2018    Procedure:  CT Angiography of the Chest was performed followed by portal venous phase   imaging of the Abdomen and Pelvis.  90 ml of Omnipaque 350 was injected intravenously. 10 ml were discarded.  Sagittal and coronal reformats were performed as well as 3D (MIP)   reconstructions.    Findings:    Chest:    Airways, pleura, lungs: Central airways patent. No pleural effusions.   There are new bilateral lung nodules (largest is 1.6 cm in the left upper   lobe), a few of which are cavitary.  Vasculature: No pulmonary emboli. Atherosclerotic disease of the aorta.  Mediastinum and tariq: Heart, pericardium unremarkable. There is an   esophageal stent containing debris. Diffuse thickening of the esophagus   is similar to prior. There is narrowing of the esophagus just superior to   the stent, possibly malignant stricture. Subcarinal lymphadenopathy   slightly decreased.  Chest wall and imaged neck: Right chest wall Mediport.  Neuromusculoskeletal: Unremarkable.    Abdomen/pelvis:    Hepatobiliary: The liver, gallbladder, and biliary tree are unremarkable  Spleen: Unremarkable  Pancreas: Unremarkable  Adrenal glands: Unremarkable  Urinary: The kidneys, ureters, and urinary bladder are unremarkable  Reproductive: The prostate and seminal vesicles are unremarkable.  Gastrointestinal: The stomach, small bowel, large bowel, and appendix are   unremarkable.  Peritoneum: Unremarkable  Vasculature: Unremarkable  Retroperitoneum: Gastrohepatic lymphadenopathy is unchanged.  Abdominal wall: Unremarkable  Neuromusculoskeletal: Degenerative disc disease    Impression:   -Progression of metastatic disease including new pulmonary metastatic   nodules detailed above, some of which are cavitary.  -Narrowing of the esophagus just superior to the stent, may be focal   stricture versus transient peristalsis.        < end of copied text >  superimposed infection on antibiotics  ?Feeding tube    Assessment: Metastatic esophageal cancer  Had concurrent chemo/RT with CDDP and CI 5 FU  Now  has pulmonary mets and esophageal stricture  Unable to eat  Consider surgical  J-tube placement for feedings  Then ? of chemo.      Thank you.

## 2018-12-21 NOTE — PROGRESS NOTE ADULT - SUBJECTIVE AND OBJECTIVE BOX
INTERVAL HPI/OVERNIGHT EVENTS:Followup for the Malignant esophageal stricture.patient had evaluation by palliative care. Patient wants to consider feeding options. No other complaints.    MEDICATIONS  (STANDING):  ALBUTerol/ipratropium for Nebulization 3 milliLiter(s) Nebulizer every 6 hours  amLODIPine   Tablet 5 milliGRAM(s) Oral daily  aspirin enteric coated 81 milliGRAM(s) Oral daily  enoxaparin Injectable 40 milliGRAM(s) SubCutaneous daily  lactated ringers. 1000 milliLiter(s) (75 mL/Hr) IV Continuous <Continuous>  levothyroxine 25 MICROGram(s) Oral daily  pantoprazole    Tablet 40 milliGRAM(s) Oral before breakfast  piperacillin/tazobactam IVPB. 3.375 Gram(s) IV Intermittent every 8 hours  simvastatin 10 milliGRAM(s) Oral at bedtime    MEDICATIONS  (PRN):      Allergies    No Known Allergies    Intolerances        Vital Signs Last 24 Hrs  T(C): 36.9 (21 Dec 2018 08:50), Max: 37 (20 Dec 2018 22:29)  T(F): 98.5 (21 Dec 2018 08:50), Max: 98.6 (20 Dec 2018 22:29)  HR: 54 (21 Dec 2018 09:18) (54 - 68)  BP: 102/63 (21 Dec 2018 08:50) (102/63 - 111/59)  BP(mean): --  RR: 18 (21 Dec 2018 08:50) (16 - 18)  SpO2: 100% (21 Dec 2018 09:18) (94% - 100%)    LABS:                        7.8    12.2  )-----------( 325      ( 21 Dec 2018 07:28 )             25.6     12-21    139  |  98  |  22.0<H>  ----------------------------<  101  4.1   |  29.0  |  0.87    Ca    8.9      21 Dec 2018 07:28  Phos  3.3     12-  Mg     2.0     12-    TPro  7.4  /  Alb  3.2<L>  /  TBili  0.3<L>  /  DBili  x   /  AST  20  /  ALT  9   /  AlkPhos  58  12-20      Urinalysis Basic - ( 21 Dec 2018 03:50 )    Color: Yellow / Appearance: Clear / S.020 / pH: x  Gluc: x / Ketone: Negative  / Bili: Negative / Urobili: Negative mg/dL   Blood: x / Protein: 30 mg/dL / Nitrite: Negative   Leuk Esterase: Negative / RBC: 0-2 /HPF / WBC 0-2   Sq Epi: x / Non Sq Epi: x / Bacteria: Many        RADIOLOGY & ADDITIONAL TESTS:

## 2018-12-21 NOTE — CONSULT NOTE ADULT - PROBLEM SELECTOR RECOMMENDATION 2
Failed swallow evaluation  NPO  CT results copied see above  Discussed placing feeding tube for nutrition and medication access, Patient wants a feeding tube placed  Dr. Lopez (GI) will contact IR to ask if tube can be placed

## 2018-12-21 NOTE — CHART NOTE - NSCHARTNOTEFT_GEN_A_CORE
Upon Nutritional Assessment by the Registered Dietitian your patient was determined to meet criteria / has evidence of the following diagnosis/diagnoses:          [ ]  Mild Protein Calorie Malnutrition        [ ]  Moderate Protein Calorie Malnutrition        [X] Severe Protein Calorie Malnutrition        [ ] Unspecified Protein Calorie Malnutrition        [ ] Underweight / BMI <19        [ ] Morbid Obesity / BMI > 40      Findings as based on:  •  Comprehensive nutrition assessment and consultation  •  Calorie counts (nutrient intake analysis)  •  Food acceptance and intake status from observations by staff  •  Follow up  •  Patient education  •  Intervention secondary to interdisciplinary rounds  •   concerns      Treatment:    The following diet has been recommended:  As medically feasible start enteral feeds of Jevity 1.5cal at 20ml/hr, increase by 10ml/hr every 8 hours to goal rate 50ml/hr x 20 hours (1,000ml/daily) to provide 1,500kcal and 64 grams protein daily.      PROVIDER Section:     By signing this assessment you are acknowledging and agree with the diagnosis/diagnoses assigned by the Registered Dietitian    Comments:

## 2018-12-21 NOTE — DIETITIAN INITIAL EVALUATION ADULT. - OTHER INFO
Pt reports no recent weight loss however pt appears cachectic and has had at least 6 lb weight loss since last admission in Nov 2018 per documented weights. Pt is currently NPO awaiting PEG placement.

## 2018-12-21 NOTE — DIETITIAN INITIAL EVALUATION ADULT. - PHYSICAL APPEARANCE
physical signs of malnutrition [ ]absent [X]present. [ ]Mild [ ]Moderate [X]Severe Muscle wasting present at [X] temporal [X]clavicle [ ]interosseos [ ]calf. [ ]Mild [ ]Moderate [X]Severe subcutaneous fat loss presents at [ ]ribs [ ]orbital region [ ]triceps [X]buccal area.

## 2018-12-21 NOTE — PROGRESS NOTE ADULT - SUBJECTIVE AND OBJECTIVE BOX
Dr. [facility] Hospitalist Progress Note  THO ROJASIREZ [id number]    Patient is a 73y old  Male who presents with a chief complaint of SOB (21 Dec 2018 13:16)    HPI:  72 y/o Macedonian speaking male, with PMH of head and neck cancer s/p left sided facial surgery for tumor resection (), Esophageal cancer with esophageal stricture s/p esophageal stent (with Dr. Herring) who received concurrent chemo/XRT with cisplatin/5 flurouracil with his last chemo therapy treatment being 1 month ago with Dr. Delacruz. Pt Presented to [facility] ED on  with 2 days pain in his chest with/ swallowing liquids and solids and regurgitation. CT scan for evaluation showed narrowing of esophagus superiorly to the stent as well as concern for progression of disease with possible lung metastasis. On 11/15 pt underwent EGD with GI, found 5mm in diameter stenosis of esophageal stent, without any obstruction or debris noted and per GI has been advanced to a pureed with honey thickened diet. Intact esophageal stent. Luminal narrowing.  No tumor ingrowth or overgrowth.  Needs to stay on a full liquid diet qith ensure supplements.  No solid food as lumen is too narrow to accommodate solid food. Pt was asked to f/u with GI  as outpatient. Also found to have CT chest consistent with new b/l Lung nodules with concern for metastasis and was recommended to f/u with CTS as outpatient for possible biopsy. Pt was discharged from [facility] at 18. Today pt returns to ED with similar complaints of SOB, productive cough & dysphagia that began today. Patient reports the cough is productive of yellowish sputum. He reports associated chest pain in his sternum when he coughs. Denies radiation of pain, aggravating/alleviating factors, associated numbness/tingling, diaphoresis, nausea/vomiting. Patient denies fevers/chills, headache, dizziness, light headedness, abdominal pain, nausea/vomiting, leg pain/swelling, hx of blood clots. CTA: No pulmonary embolus. Interval increase in size of numerous bilateral metastatic nodules, some of which are cavitary. Superimposed tree-in-bud nodular airspace opacities, predominantly within the left upper lobe, which may represent superimposed infection. RVP +ve for rhinovirus. Pt failed dysphagia screen in the ED when he started choking & coughing on a small amount of thin liquids (20 Dec 2018 13:00).     Interval: seen at bedside. asking for food. wants feeding tube like PEG. seen by GI. palliative, nutrition service, hospice. awaiting PEG.       ROS:  CONSTITUTIONAL:  No distress. no fever/chills. +fatigue. +weight loss  HEENT:  Eyes:  No diplopia or blurred vision.   CARDIOVASCULAR:  No pressure, squeezing, tightness, heaviness or aching about the chest; no palpitations. no leg swelling, no orthopnea or PND  RESPIRATORY:  no SOB. no wheezing.no cough or sputum.  No hemoptysis  GI: no abd pain, no nausea, no vomiting, no diarrhea, no constipation. No hematochezia or melena  EXT:No joint pain or joint swelling or redness. no leg or calf pain  SKIN: no skin break or ulcer. No rash  CNS: No headaches. No weakness.no numbness. No depression or anxiety. No SI    T(C): 36.9 (18 @ 08:50), Max: 37 (18 @ 22:29)  HR: 58 (18 @ 15:08) (54 - 67)  BP: 102/63 (18 @ 08:50) (102/63 - 111/59)  RR: 18 (18 @ 08:50) (16 - 18)  SpO2: 97% (18 @ 15:08) (94% - 100%)  CAPILLARY BLOOD GLUCOSE    Physical Exam:  GENERAL: Not in distress. Alert . cachectic  HEENT:  Normocephalic and atraumatic.   NECK: Supple.     CARDIOVASCULAR: RRR S1, S2. No murmur/rubs/gallop  LUNGS: BLAE+, no rales, no wheezing, no rhonchi.    ABDOMEN: ND. Soft,  NT, no guarding / rebound / rigidity. BS normoactive. No CVA tenderness.    BACK: No spine tenderness.  EXTREMITIES: no cyanosis, no clubbing, no edema.   SKIN: no rash. No skin break or ulcer. No cellulitis.  NEUROLOGIC: AAO*3.strength is symmetric, sensation intact, speech fluent.    PSYCHIATRIC: Calm.  No agitation.    Labs                        7.8    12.2  )-----------( 325      ( 21 Dec 2018 07:28 )             25.6     12-    139  |  98  |  22.0<H>  ----------------------------<  101  4.1   |  29.0  |  0.87    Ca    8.9      21 Dec 2018 07:28  Phos  3.3       Mg     2.0         TPro  7.4  /  Alb  3.2<L>  /  TBili  0.3<L>  /  DBili  x   /  AST  20  /  ALT  9   /  AlkPhos  58     Urinalysis Basic - ( 21 Dec 2018 03:50 )    Color: Yellow / Appearance: Clear / S.020 / pH: x  Gluc: x / Ketone: Negative  / Bili: Negative / Urobili: Negative mg/dL   Blood: x / Protein: 30 mg/dL / Nitrite: Negative   Leuk Esterase: Negative / RBC: 0-2 /HPF / WBC 0-2   Sq Epi: x / Non Sq Epi: x / Bacteria: Many    Procalcitonin, Serum (.18 @ 16:12)    Procalcitonin, Serum: 4.75: Procalcitonin (PCT) Interpretation (ng/mL) - Diagnosis of systemic  bacterial infection/sepsis  PCT < 0.5: Systemic infection (sepsis) is not likely and risk for  progression to severe systemic infection is low. Local bacterial  infection is possible. If early sepsis is suspected clinically, PCT  should be re-assessed in 6-24 hours.  PCT >/= 0.5 but < 2.0: Systemic infection (sepsis) is possible, but other  conditions are known to elevate PCT as well. Moderate risk for  progression to severe systemic infection. The patient should be closely  monitored both clinically and by re-assessing PCT within 6-24 hours.  PCT >/= 2.0 but < 10.0: Systemic infection (sepsis) is likely, unless  other causes are known. High risk of progression to severe systemic  infection (severe sepsis/septic shock).  PCT >/= 10.0: Important systemic inflammatory response, almost  exclusively due to severe bacterial sepsis or septic shock. High  likelihood of severe sepsis or septic shock. ng/mL        MEDICATIONS  (STANDING):  ALBUTerol/ipratropium for Nebulization 3 milliLiter(s) Nebulizer every 6 hours  amLODIPine   Tablet 5 milliGRAM(s) Oral daily  aspirin enteric coated 81 milliGRAM(s) Oral daily  enoxaparin Injectable 40 milliGRAM(s) SubCutaneous daily  lactated ringers. 1000 milliLiter(s) (75 mL/Hr) IV Continuous <Continuous>  levothyroxine 25 MICROGram(s) Oral daily  pantoprazole    Tablet 40 milliGRAM(s) Oral before breakfast  piperacillin/tazobactam IVPB. 3.375 Gram(s) IV Intermittent every 8 hours  simvastatin 10 milliGRAM(s) Oral at bedtime    MEDICATIONS  (PRN):    Culture - Sputum (collected 18 @ 12:54)  Source: .Sputum  Gram Stain (18 @ 15:45):    Few White blood cells    No organisms seen    < from: CT Angio Chest w/ IV Cont (18 @ 04:09) >  IMPRESSION:     No pulmonary embolus.    Interval increase in size of numerous bilateral metastatic nodules, some   of which are cavitary.    Superimposed tree-in-bud nodular airspace opacities, predominantly within   the left upper lobe, which may represent superimposed infection.            < end of copied text >

## 2018-12-22 LAB
ANION GAP SERPL CALC-SCNC: 15 MMOL/L — SIGNIFICANT CHANGE UP (ref 5–17)
ANISOCYTOSIS BLD QL: SLIGHT — SIGNIFICANT CHANGE UP
BASOPHILS # BLD AUTO: 0 K/UL — SIGNIFICANT CHANGE UP (ref 0–0.2)
BASOPHILS NFR BLD AUTO: 0.2 % — SIGNIFICANT CHANGE UP (ref 0–2)
BUN SERPL-MCNC: 24 MG/DL — HIGH (ref 8–20)
CALCIUM SERPL-MCNC: 9.1 MG/DL — SIGNIFICANT CHANGE UP (ref 8.6–10.2)
CHLORIDE SERPL-SCNC: 99 MMOL/L — SIGNIFICANT CHANGE UP (ref 98–107)
CO2 SERPL-SCNC: 27 MMOL/L — SIGNIFICANT CHANGE UP (ref 22–29)
CREAT SERPL-MCNC: 0.89 MG/DL — SIGNIFICANT CHANGE UP (ref 0.5–1.3)
CULTURE RESULTS: NO GROWTH — SIGNIFICANT CHANGE UP
EOSINOPHIL # BLD AUTO: 0.1 K/UL — SIGNIFICANT CHANGE UP (ref 0–0.5)
EOSINOPHIL NFR BLD AUTO: 0.9 % — SIGNIFICANT CHANGE UP (ref 0–5)
GLUCOSE SERPL-MCNC: 72 MG/DL — SIGNIFICANT CHANGE UP (ref 70–115)
HCT VFR BLD CALC: 25.6 % — LOW (ref 42–52)
HGB BLD-MCNC: 7.9 G/DL — LOW (ref 14–18)
HYPOCHROMIA BLD QL: SLIGHT — SIGNIFICANT CHANGE UP
LYMPHOCYTES # BLD AUTO: 0.6 K/UL — LOW (ref 1–4.8)
LYMPHOCYTES # BLD AUTO: 4.6 % — LOW (ref 20–55)
MACROCYTES BLD QL: SLIGHT — SIGNIFICANT CHANGE UP
MAGNESIUM SERPL-MCNC: 2.1 MG/DL — SIGNIFICANT CHANGE UP (ref 1.6–2.6)
MCHC RBC-ENTMCNC: 28.9 PG — SIGNIFICANT CHANGE UP (ref 27–31)
MCHC RBC-ENTMCNC: 30.9 G/DL — LOW (ref 32–36)
MCV RBC AUTO: 93.8 FL — SIGNIFICANT CHANGE UP (ref 80–94)
MICROCYTES BLD QL: SLIGHT — SIGNIFICANT CHANGE UP
MONOCYTES # BLD AUTO: 0.9 K/UL — HIGH (ref 0–0.8)
MONOCYTES NFR BLD AUTO: 7 % — SIGNIFICANT CHANGE UP (ref 3–10)
NEUTROPHILS # BLD AUTO: 11.1 K/UL — HIGH (ref 1.8–8)
NEUTROPHILS NFR BLD AUTO: 87 % — HIGH (ref 37–73)
PHOSPHATE SERPL-MCNC: 3.4 MG/DL — SIGNIFICANT CHANGE UP (ref 2.4–4.7)
PLAT MORPH BLD: SIGNIFICANT CHANGE UP
PLATELET # BLD AUTO: 350 K/UL — SIGNIFICANT CHANGE UP (ref 150–400)
POIKILOCYTOSIS BLD QL AUTO: SLIGHT — SIGNIFICANT CHANGE UP
POTASSIUM SERPL-MCNC: 4.6 MMOL/L — SIGNIFICANT CHANGE UP (ref 3.5–5.3)
POTASSIUM SERPL-SCNC: 4.6 MMOL/L — SIGNIFICANT CHANGE UP (ref 3.5–5.3)
RBC # BLD: 2.73 M/UL — LOW (ref 4.6–6.2)
RBC # FLD: 16.3 % — HIGH (ref 11–15.6)
RBC BLD AUTO: ABNORMAL
SODIUM SERPL-SCNC: 141 MMOL/L — SIGNIFICANT CHANGE UP (ref 135–145)
SPECIMEN SOURCE: SIGNIFICANT CHANGE UP
WBC # BLD: 12.7 K/UL — HIGH (ref 4.8–10.8)
WBC # FLD AUTO: 12.7 K/UL — HIGH (ref 4.8–10.8)

## 2018-12-22 PROCEDURE — 99232 SBSQ HOSP IP/OBS MODERATE 35: CPT

## 2018-12-22 RX ADMIN — PIPERACILLIN AND TAZOBACTAM 25 GRAM(S): 4; .5 INJECTION, POWDER, LYOPHILIZED, FOR SOLUTION INTRAVENOUS at 05:16

## 2018-12-22 RX ADMIN — SODIUM CHLORIDE 75 MILLILITER(S): 9 INJECTION, SOLUTION INTRAVENOUS at 13:07

## 2018-12-22 RX ADMIN — SODIUM CHLORIDE 75 MILLILITER(S): 9 INJECTION, SOLUTION INTRAVENOUS at 23:26

## 2018-12-22 RX ADMIN — Medication 3 MILLILITER(S): at 09:16

## 2018-12-22 RX ADMIN — Medication 3 MILLILITER(S): at 15:49

## 2018-12-22 RX ADMIN — PIPERACILLIN AND TAZOBACTAM 25 GRAM(S): 4; .5 INJECTION, POWDER, LYOPHILIZED, FOR SOLUTION INTRAVENOUS at 13:09

## 2018-12-22 RX ADMIN — ENOXAPARIN SODIUM 40 MILLIGRAM(S): 100 INJECTION SUBCUTANEOUS at 13:06

## 2018-12-22 RX ADMIN — PIPERACILLIN AND TAZOBACTAM 25 GRAM(S): 4; .5 INJECTION, POWDER, LYOPHILIZED, FOR SOLUTION INTRAVENOUS at 23:25

## 2018-12-22 RX ADMIN — Medication 3 MILLILITER(S): at 21:11

## 2018-12-22 NOTE — PROGRESS NOTE ADULT - SUBJECTIVE AND OBJECTIVE BOX
CC: Follow up for sob    INTERVAL HPI/OVERNIGHT EVENTS: Patient seen and examined, no acute complaints overnight.       Vital Signs Last 24 Hrs  T(C): 36.7 (22 Dec 2018 15:40), Max: 37.8 (22 Dec 2018 00:19)  T(F): 98.1 (22 Dec 2018 15:40), Max: 100.1 (22 Dec 2018 00:19)  HR: 59 (22 Dec 2018 15:40) (57 - 66)  BP: 128/64 (22 Dec 2018 15:40) (98/56 - 128/64)  BP(mean): --  RR: 18 (22 Dec 2018 00:19) (18 - 20)  SpO2: 86% (21 Dec 2018 17:05) (86% - 86%)    PHYSICAL EXAM:    GENERAL: NAD, AOX3  HEAD:  Atraumatic, Normocephalic  CHEST/LUNG: Clear to auscultation bilaterally; No rales, rhonchi, wheezing, or rubs  HEART: Regular rate and rhythm; No murmurs, rubs, or gallops  ABDOMEN: Soft, Nontender, Nondistended; Bowel sounds present  EXTREMITIES:  2+ Peripheral Pulses, No clubbing, cyanosis, or edema        MEDICATIONS  (STANDING):  ALBUTerol/ipratropium for Nebulization 3 milliLiter(s) Nebulizer every 6 hours  amLODIPine   Tablet 5 milliGRAM(s) Oral daily  aspirin enteric coated 81 milliGRAM(s) Oral daily  enoxaparin Injectable 40 milliGRAM(s) SubCutaneous daily  lactated ringers. 1000 milliLiter(s) (75 mL/Hr) IV Continuous <Continuous>  levothyroxine 25 MICROGram(s) Oral daily  pantoprazole    Tablet 40 milliGRAM(s) Oral before breakfast  piperacillin/tazobactam IVPB. 3.375 Gram(s) IV Intermittent every 8 hours  simvastatin 10 milliGRAM(s) Oral at bedtime    MEDICATIONS  (PRN):      Allergies    No Known Allergies    Intolerances          LABS:                          7.9    12.7  )-----------( 350      ( 22 Dec 2018 06:46 )             25.6     12-    141  |  99  |  24.0<H>  ----------------------------<  72  4.6   |  27.0  |  0.89    Ca    9.1      22 Dec 2018 06:46  Phos  3.4     12-  Mg     2.1     12-        Urinalysis Basic - ( 21 Dec 2018 03:50 )    Color: Yellow / Appearance: Clear / S.020 / pH: x  Gluc: x / Ketone: Negative  / Bili: Negative / Urobili: Negative mg/dL   Blood: x / Protein: 30 mg/dL / Nitrite: Negative   Leuk Esterase: Negative / RBC: 0-2 /HPF / WBC 0-2   Sq Epi: x / Non Sq Epi: x / Bacteria: Many        RADIOLOGY & ADDITIONAL TESTS:

## 2018-12-22 NOTE — PROGRESS NOTE ADULT - ASSESSMENT
Completely occluded distal esophagus precludes any endoscopic procedure or IR placement of PEG.    Suggest: surgical evaluation for placement of either surgical PEG or J feeding tube.   Pt made aware.

## 2018-12-22 NOTE — PROGRESS NOTE ADULT - SUBJECTIVE AND OBJECTIVE BOX
Patient seen and examined; chart reviewed.  CT reviewed with IR: HA.  Esophageal tumor has overgrown the previously placed stent.  Tumor completely occludes the esophageal lumen below the stent for several cm.  Therefore unable to get NGT or scope through the completely obstructed esophagus.      PAST MEDICAL & SURGICAL HISTORY:  Malignant neoplasm of esophagus, unspecified location  High cholesterol  Hypothyroid  HTN (hypertension)  Head and neck cancer:   History of head and neck cancer      ROS:  No Heartburn, regurgitation, dysphagia, odynophagia.  No dyspepsia  No abdominal pain.    No Nausea, vomiting.  No Bleeding.  No hematemesis.   No diarrhea.    No hematochesia.  No weight loss, anorexia.  No edema.      MEDICATIONS  (STANDING):  ALBUTerol/ipratropium for Nebulization 3 milliLiter(s) Nebulizer every 6 hours  amLODIPine   Tablet 5 milliGRAM(s) Oral daily  aspirin enteric coated 81 milliGRAM(s) Oral daily  enoxaparin Injectable 40 milliGRAM(s) SubCutaneous daily  lactated ringers. 1000 milliLiter(s) (75 mL/Hr) IV Continuous <Continuous>  levothyroxine 25 MICROGram(s) Oral daily  pantoprazole    Tablet 40 milliGRAM(s) Oral before breakfast  piperacillin/tazobactam IVPB. 3.375 Gram(s) IV Intermittent every 8 hours  simvastatin 10 milliGRAM(s) Oral at bedtime    MEDICATIONS  (PRN):      Allergies    No Known Allergies    Intolerances        Vital Signs Last 24 Hrs  T(C): 36.7 (22 Dec 2018 15:40), Max: 37.8 (22 Dec 2018 00:19)  T(F): 98.1 (22 Dec 2018 15:40), Max: 100.1 (22 Dec 2018 00:19)  HR: 72 (22 Dec 2018 15:49) (57 - 76)  BP: 128/64 (22 Dec 2018 15:40) (99/50 - 128/64)  BP(mean): --  RR: 18 (22 Dec 2018 00:19) (18 - 18)  SpO2: 100% (22 Dec 2018 15:49) (96% - 100%)    PHYSICAL EXAM:    GENERAL: NAD, well-groomed, well-developed  HEAD:  Atraumatic, Normocephalic  EYES: EOMI, PERRLA, conjunctiva and sclera clear  ENMT: No tonsillar erythema, exudates, or enlargement; Moist mucous membranes, Good dentition, No lesions  NECK: Supple, No JVD, Normal thyroid  CHEST/LUNG: Clear to percussion bilaterally; No rales, rhonchi, wheezing, or rubs  HEART: Regular rate and rhythm; No murmurs, rubs, or gallops  ABDOMEN: Soft, Nontender, Nondistended; Bowel sounds present; Scaphoid shape.  No HSM.  No MTR.    EXTREMITIES:  2+ Peripheral Pulses, No clubbing, cyanosis, or edema  LYMPH: No lymphadenopathy noted  SKIN: No rashes or lesions      LABS:                        7.9    12.7  )-----------( 350      ( 22 Dec 2018 06:46 )             25.6     12-    141  |  99  |  24.0<H>  ----------------------------<  72  4.6   |  27.0  |  0.89    Ca    9.1      22 Dec 2018 06:46  Phos  3.4       Mg     2.1              Urinalysis Basic - ( 21 Dec 2018 03:50 )    Color: Yellow / Appearance: Clear / S.020 / pH: x  Gluc: x / Ketone: Negative  / Bili: Negative / Urobili: Negative mg/dL   Blood: x / Protein: 30 mg/dL / Nitrite: Negative   Leuk Esterase: Negative / RBC: 0-2 /HPF / WBC 0-2   Sq Epi: x / Non Sq Epi: x / Bacteria: Many          RADIOLOGY & ADDITIONAL STUDIES:

## 2018-12-22 NOTE — PROGRESS NOTE ADULT - ASSESSMENT
72 y/o Upper sorbian speaking male, with PMH of head and neck cancer s/p left sided facial surgery for tumor resection (2007), Esophageal cancer with esophageal stricture s/p esophageal stent (with Dr. Herring) who received concurrent chemo/XRT with cisplatin/5 flurouracil with his last chemo therapy treatment being 1 month ago with Dr. Delacruz. He was admitted in November for chest pain and dysphagia. EGD showed esophageal stent stenosis. He was advised a full liquid diet. CT chest was consistent with bilateral nodules concerning for mets. He returned to the Er for sob, dysphagia and cough.   Positive for RVP rhinovirus. He failed initial dysphagia screen. Was seen by GI, recommend alterative means of nutrition vs hospice.     Assessment/Plan:    1. SOB secondary to + Rhinovirus and worsening lung mets.   Supportive care  IVF  Duonebs  O2 prn  IV zosyn day 2 for possible superimposed aspiration pneumonia  f/u Blood cx, sputum cx, Ucx      2. Worsening Dysphagia likely from Esophageal stricture from progression of esophageal cancer  GI called, Failed dysphagia screen, Continue to keep NPO   PEg by GI vs IR; GI to follow up    3. Malignant neoplasm of esophagus with likely mets to lung   hem onc consult appreciated ? further chemotherapy   ct surgery consult called   seen by hospice and palliative team. patient DNR/DNI. considering home hospice after PEG placement    4, Hypothyroidism: Continue synthroid    5.Hypertension: Stable    6. Severe protein calorie malnutrition.      DVT ppx: lovenox  GI: PPI  Diet: NPO for risk of aspiration    prognosis poor

## 2018-12-22 NOTE — PROGRESS NOTE ADULT - SUBJECTIVE AND OBJECTIVE BOX
PI: Patient is a 73y Male seen on consultation for the evaluation and management of Esophageal cancer.  Initially diagnosed in  2006  with laryngeal cancer with T4 lesion, 3 plus cervical nodes, treated with surgery and concurrent chemo/RT; finished treatment in 5/2007.  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  He has received tx with concurrent  CDDP/5-FU and RT.  Last treatment early November.  Had admission to evaluate for chest pain, difficulty swallowing.  EGD showed narrowing.  Recommendations included diet of full liquids as could not tolerate solids.  New pulmonary nodules noted on CT chest.  No biopsy recommended for lung lesion last admission.  Returns to ER c/o SOB, cough productive of yellow sputum,. pain in sternum with coughing.  Difficulty swallowing.  Noted to have progressive lung nodules. Possible superimposed infection.  Denies nausea or vomiting, fevers, chills, sweats or night sweats, headaches or dizziness.   Unable to eat only for a few days.    INTERVAL HISTORY    patient denies any fevers, bleeding, has been NPO    PAST MEDICAL & SURGICAL HISTORY:  Malignant neoplasm of esophagus, unspecified location  High cholesterol  Hypothyroid  HTN (hypertension)  Head and neck cancer: 2007  History of head and neck cancer      MEDICATIONS  (STANDING):  ALBUTerol/ipratropium for Nebulization 3 milliLiter(s) Nebulizer every 6 hours  amLODIPine   Tablet 5 milliGRAM(s) Oral daily  aspirin enteric coated 81 milliGRAM(s) Oral daily  enoxaparin Injectable 40 milliGRAM(s) SubCutaneous daily  lactated ringers. 1000 milliLiter(s) (75 mL/Hr) IV Continuous <Continuous>  levothyroxine 25 MICROGram(s) Oral daily  pantoprazole    Tablet 40 milliGRAM(s) Oral before breakfast  piperacillin/tazobactam IVPB. 3.375 Gram(s) IV Intermittent every 8 hours  simvastatin 10 milliGRAM(s) Oral at bedtime        P/E: Alert and oriented  Vital Signs Last 24 Hrs  T(C): 36.7 (22 Dec 2018 15:40), Max: 37.8 (22 Dec 2018 00:19)  T(F): 98.1 (22 Dec 2018 15:40), Max: 100.1 (22 Dec 2018 00:19)  HR: 59 (22 Dec 2018 15:40) (57 - 65)  BP: 128/64 (22 Dec 2018 15:40) (99/50 - 128/64)  BP(mean): --  RR: 18 (22 Dec 2018 00:19) (18 - 18)  SpO2: --  Pale  No icterus  No cervical LNs  Lungs: Diminished BS but clear  Heart: RR  Abd: Non-tender  No palpable HSM  Exts: Cachectic.                  7.9                  141  | 27.0 | 24.0         12.7  >-----------< 350     ------------------------< 72                    25.6                 4.6  | 99   | 0.89                                         Ca 9.1   Mg 2.1   Ph 3.4      superimposed infection on antibiotics  ?Feeding tube    Assessment: Metastatic esophageal cancer  Had concurrent chemo/RT with CDDP and CI 5 FU  Now  has pulmonary mets and esophageal stricture  Unable to eat  patient being followed by GI. plan for EGD tube and peg placement  Then ? of chemo.

## 2018-12-23 LAB
-  AMIKACIN: SIGNIFICANT CHANGE UP
-  AMPICILLIN/SULBACTAM: SIGNIFICANT CHANGE UP
-  AMPICILLIN/SULBACTAM: SIGNIFICANT CHANGE UP
-  AMPICILLIN: SIGNIFICANT CHANGE UP
-  AZTREONAM: SIGNIFICANT CHANGE UP
-  CEFAZOLIN: SIGNIFICANT CHANGE UP
-  CEFAZOLIN: SIGNIFICANT CHANGE UP
-  CEFEPIME: SIGNIFICANT CHANGE UP
-  CEFOXITIN: SIGNIFICANT CHANGE UP
-  CEFTRIAXONE: SIGNIFICANT CHANGE UP
-  CIPROFLOXACIN: SIGNIFICANT CHANGE UP
-  CLINDAMYCIN: SIGNIFICANT CHANGE UP
-  ERTAPENEM: SIGNIFICANT CHANGE UP
-  ERYTHROMYCIN: SIGNIFICANT CHANGE UP
-  GENTAMICIN: SIGNIFICANT CHANGE UP
-  GENTAMICIN: SIGNIFICANT CHANGE UP
-  IMIPENEM: SIGNIFICANT CHANGE UP
-  LEVOFLOXACIN: SIGNIFICANT CHANGE UP
-  MEROPENEM: SIGNIFICANT CHANGE UP
-  OXACILLIN: SIGNIFICANT CHANGE UP
-  PIPERACILLIN/TAZOBACTAM: SIGNIFICANT CHANGE UP
-  RIFAMPIN: SIGNIFICANT CHANGE UP
-  TETRACYCLINE: SIGNIFICANT CHANGE UP
-  TOBRAMYCIN: SIGNIFICANT CHANGE UP
-  TRIMETHOPRIM/SULFAMETHOXAZOLE: SIGNIFICANT CHANGE UP
-  TRIMETHOPRIM/SULFAMETHOXAZOLE: SIGNIFICANT CHANGE UP
-  VANCOMYCIN: SIGNIFICANT CHANGE UP
CULTURE RESULTS: SIGNIFICANT CHANGE UP
HCT VFR BLD CALC: 25 % — LOW (ref 42–52)
HGB BLD-MCNC: 7.8 G/DL — LOW (ref 14–18)
MCHC RBC-ENTMCNC: 29.4 PG — SIGNIFICANT CHANGE UP (ref 27–31)
MCHC RBC-ENTMCNC: 31.2 G/DL — LOW (ref 32–36)
MCV RBC AUTO: 94.3 FL — HIGH (ref 80–94)
METHOD TYPE: SIGNIFICANT CHANGE UP
METHOD TYPE: SIGNIFICANT CHANGE UP
ORGANISM # SPEC MICROSCOPIC CNT: SIGNIFICANT CHANGE UP
PLATELET # BLD AUTO: 361 K/UL — SIGNIFICANT CHANGE UP (ref 150–400)
RBC # BLD: 2.65 M/UL — LOW (ref 4.6–6.2)
RBC # FLD: 16 % — HIGH (ref 11–15.6)
SPECIMEN SOURCE: SIGNIFICANT CHANGE UP
WBC # BLD: 8.7 K/UL — SIGNIFICANT CHANGE UP (ref 4.8–10.8)
WBC # FLD AUTO: 8.7 K/UL — SIGNIFICANT CHANGE UP (ref 4.8–10.8)

## 2018-12-23 PROCEDURE — 99232 SBSQ HOSP IP/OBS MODERATE 35: CPT

## 2018-12-23 RX ADMIN — Medication 3 MILLILITER(S): at 08:40

## 2018-12-23 RX ADMIN — PIPERACILLIN AND TAZOBACTAM 25 GRAM(S): 4; .5 INJECTION, POWDER, LYOPHILIZED, FOR SOLUTION INTRAVENOUS at 14:00

## 2018-12-23 RX ADMIN — PIPERACILLIN AND TAZOBACTAM 25 GRAM(S): 4; .5 INJECTION, POWDER, LYOPHILIZED, FOR SOLUTION INTRAVENOUS at 06:38

## 2018-12-23 RX ADMIN — ENOXAPARIN SODIUM 40 MILLIGRAM(S): 100 INJECTION SUBCUTANEOUS at 11:30

## 2018-12-23 RX ADMIN — Medication 3 MILLILITER(S): at 03:17

## 2018-12-23 RX ADMIN — Medication 3 MILLILITER(S): at 15:26

## 2018-12-23 RX ADMIN — PIPERACILLIN AND TAZOBACTAM 25 GRAM(S): 4; .5 INJECTION, POWDER, LYOPHILIZED, FOR SOLUTION INTRAVENOUS at 21:58

## 2018-12-23 NOTE — CONSULT NOTE ADULT - PROBLEM SELECTOR RECOMMENDATION 9
Last chemo radiation treatment one month ago  Dr. Nickerson to resconsult Patient to discuss future options for curative treatment
-plan for OR 12/24 for open J tube placement  -NPO/IVF  -will need to obtain consent from HCP Brice [phone]  -type/screen  -dvt ppx

## 2018-12-23 NOTE — PROGRESS NOTE ADULT - ASSESSMENT
74 y/o Estonian speaking male, with PMH of head and neck cancer s/p left sided facial surgery for tumor resection (2007), Esophageal cancer with esophageal stricture s/p esophageal stent (with Dr. Herring) who received concurrent chemo/XRT with cisplatin/5 flurouracil with his last chemo therapy treatment being 1 month ago with Dr. Delacruz. He was admitted in November for chest pain and dysphagia. EGD showed esophageal stent stenosis. He was advised a full liquid diet. CT chest was consistent with bilateral nodules concerning for mets. He returned to the Er for sob, dysphagia and cough.   Positive for RVP rhinovirus. He failed initial dysphagia screen. Was seen by GI, recommend alterative means of nutrition     Assessment/Plan:    1. SOB secondary to + Rhinovirus and worsening lung mets.   Supportive care  IVF  Duonebs  O2 prn  IV zosyn day 4/5 for possible superimposed aspiration pneumonia  Blood and urine cultures negative       2. Worsening Dysphagia likely from Esophageal stricture from progression of esophageal cancer  GI called, Failed dysphagia screen, Continue to keep NPO- Per GI unable to pass scope for PEG placement- recommend surgery consult. ACS consulted.       3. Malignant neoplasm of esophagus with likely mets to lung   hem onc consult appreciated ? further chemotherapy   ct surgery consult called   seen by hospice and palliative team. patient DNR/DNI    4, Hypothyroidism: Continue synthroid    5.Hypertension: Stable    6. Severe protein calorie malnutrition.      DVT ppx: lovenox  GI: PPI  Diet: NPO for risk of aspiration    prognosis poor

## 2018-12-23 NOTE — CONSULT NOTE ADULT - ATTENDING COMMENTS
73M with hx of esophageal cancer s/p chemoradiation now with recurrence with squamous cell carcinoma of mid esophagus that was stented a few months ago but now completely occluded admitted for aspiration pna.  PEG unsuccesful due to complete occlusion of esophagus.    AVSS  NAD  Patient is cachectic, frail in appearance  Abdomen is soft, nondistended, well healed midline incisional scar  No edema of lower extremities    73M with metastatic esophageal carcinoma in need of feeding access with plans for on going chemotherapy by oncology  - tentatively scheduled for open gastrostomy vs jejunostomy tube placement 12/24/18.
COUNSELING:    Face to face meeting to discuss Advanced Care Planning - Time Spent __45____ Minutes.  See goals of care note.    More than 50% time spent in counseling and coordinating care. ___30___ Minutes.     Thank you for the opportunity to assist with the care of this patient.   [facility] [phone].

## 2018-12-23 NOTE — PROGRESS NOTE ADULT - SUBJECTIVE AND OBJECTIVE BOX
CC: Follow up dysphagia    INTERVAL HPI/ OVERNIGHT EVENTS: Patient seen and examined, no acute complaints.       Vital Signs Last 24 Hrs  T(C): 36.8 (23 Dec 2018 07:00), Max: 37.1 (22 Dec 2018 23:55)  T(F): 98.2 (23 Dec 2018 07:00), Max: 98.7 (22 Dec 2018 23:55)  HR: 55 (23 Dec 2018 08:40) (55 - 76)  BP: 121/65 (23 Dec 2018 07:00) (121/65 - 136/69)  BP(mean): --  RR: 10 (23 Dec 2018 07:00) (10 - 10)  SpO2: 100% (23 Dec 2018 08:40) (98% - 100%)    PHYSICAL EXAM:    GENERAL: NAD, AOx3  HEAD:  Atraumatic, Normocephalic  CHEST/LUNG: Clear to auscultation bilaterally; No rales, rhonchi, wheezing, or rubs  HEART: Regular rate and rhythm; No murmurs, rubs, or gallops  ABDOMEN: Soft, Nontender, Nondistended; Bowel sounds present  EXTREMITIES:  2+ Peripheral Pulses, No clubbing, cyanosis, or edema        MEDICATIONS  (STANDING):  ALBUTerol/ipratropium for Nebulization 3 milliLiter(s) Nebulizer every 6 hours  amLODIPine   Tablet 5 milliGRAM(s) Oral daily  aspirin enteric coated 81 milliGRAM(s) Oral daily  enoxaparin Injectable 40 milliGRAM(s) SubCutaneous daily  lactated ringers. 1000 milliLiter(s) (75 mL/Hr) IV Continuous <Continuous>  levothyroxine 25 MICROGram(s) Oral daily  pantoprazole    Tablet 40 milliGRAM(s) Oral before breakfast  piperacillin/tazobactam IVPB. 3.375 Gram(s) IV Intermittent every 8 hours  simvastatin 10 milliGRAM(s) Oral at bedtime    MEDICATIONS  (PRN):      Allergies    No Known Allergies    Intolerances          LABS:                          7.8    8.7   )-----------( 361      ( 23 Dec 2018 07:21 )             25.0     12-22    141  |  99  |  24.0<H>  ----------------------------<  72  4.6   |  27.0  |  0.89    Ca    9.1      22 Dec 2018 06:46  Phos  3.4     12-22  Mg     2.1     12-22            RADIOLOGY & ADDITIONAL TESTS:

## 2018-12-23 NOTE — CONSULT NOTE ADULT - ASSESSMENT
74 yo  Cachectic Male with severe Dysphagia from Recurrent Esophageal Cancer disease progression
Imp:  Prior layryngeal cancer  Esophageal cancer, s/p chemo/RT; has esophageal narrowing  Pulmonary mets/ superimposed infection on antibiotics  ?Feeding tube
In short, this is a 73-year-old man with advancing metastatic esophageal cancer who presented to the ED with worsening dysphagia and dyspnea.  The etiology of his dysphagia is almost certainly secondary to worsening tumor burden.  His last EGD showed severe esophageal stenosis, and this has likely worsened.  For nutrition, the likely only way to achieve this would be a gastrostomy feeding tube.  Because of the severe stenosis, we would not be able to place this endoscopically.  If such a course is desired by the patient, recommend interventional radiology or surgically placed PEG.  Overall, the patient should know that any initiation of supplemental enteral nutrition will likely hasten his death rather than prolong his life.  Strongly recommend palliative care and hospice consultation.    - Continue to keep NPO  - I will discuss with my partners if there is anything else that we could offer this gentleman to assuage his symptoms    Thank you for the consult.  Please do not hesitate to call with any questions or concerns.    LIDIA Davis MD  [facility] Physician [facility]  Division of Gastroenterology  Tel [phone]  Fax [phone]  12-20-18 @ 11:32
73 year old male with metastatic squamous cell esophageal cancer with inability to tolerate any oral intake including liquids being treated for aspiration pneumonia. GI unable to pass endoscope past distal obstruction of esophagus for PEG

## 2018-12-23 NOTE — PROGRESS NOTE ADULT - SUBJECTIVE AND OBJECTIVE BOX
PI: Patient is a 73y Male seen on consultation for the evaluation and management of Esophageal cancer.  Initially diagnosed in  2006  with laryngeal cancer with T4 lesion, 3 plus cervical nodes, treated with surgery and concurrent chemo/RT; finished treatment in 5/2007.  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  He has received tx with concurrent  CDDP/5-FU and RT.  Last treatment early November.  Had admission to evaluate for chest pain, difficulty swallowing.  EGD showed narrowing.  Recommendations included diet of full liquids as could not tolerate solids.  New pulmonary nodules noted on CT chest.  No biopsy recommended for lung lesion last admission.  Returns to ER c/o SOB, cough productive of yellow sputum,. pain in sternum with coughing.  Difficulty swallowing.  Noted to have progressive lung nodules. Possible superimposed infection.  Denies nausea or vomiting, fevers, chills, sweats or night sweats, headaches or dizziness.   Unable to eat only for a few days.    INTERVAL HISTORY    patient denies any fevers, bleeding, has been NPO. patient underwent egd which demonstrated complete occlusion. no bleeding    PAST MEDICAL & SURGICAL HISTORY:  Malignant neoplasm of esophagus, unspecified location  High cholesterol  Hypothyroid  HTN (hypertension)  Head and neck cancer: 2007  History of head and neck cancer    MEDICATIONS  (STANDING):  ALBUTerol/ipratropium for Nebulization 3 milliLiter(s) Nebulizer every 6 hours  amLODIPine   Tablet 5 milliGRAM(s) Oral daily  aspirin enteric coated 81 milliGRAM(s) Oral daily  enoxaparin Injectable 40 milliGRAM(s) SubCutaneous daily  lactated ringers. 1000 milliLiter(s) (75 mL/Hr) IV Continuous <Continuous>  levothyroxine 25 MICROGram(s) Oral daily  pantoprazole    Tablet 40 milliGRAM(s) Oral before breakfast  piperacillin/tazobactam IVPB. 3.375 Gram(s) IV Intermittent every 8 hours  simvastatin 10 milliGRAM(s) Oral at bedtime          P/E: Alert and oriented  Vital Signs Last 24 Hrs  T(C): 37 (23 Dec 2018 15:37), Max: 37.1 (22 Dec 2018 23:55)  T(F): 98.6 (23 Dec 2018 15:37), Max: 98.7 (22 Dec 2018 23:55)  HR: 57 (23 Dec 2018 15:37) (55 - 76)  BP: 139/66 (23 Dec 2018 15:37) (121/65 - 139/66)  BP(mean): --  RR: 10 (23 Dec 2018 07:00) (10 - 10)  SpO2: 100% (23 Dec 2018 08:40) (98% - 100%)  Pale  No icterus  No cervical LNs  Lungs: Diminished BS but clear  Heart: RR  Abd: Non-tender  No palpable HSM  Exts: Cachectic.                7.8                  x    | x    | x            8.7   >-----------< 361     ------------------------< x                     25.0                 x    | x    | x                                            Ca x     Mg x     Ph x          superimposed infection on antibiotics  ?Feeding tube    Assessment: Metastatic esophageal cancer  Had concurrent chemo/RT with CDDP and CI 5 FU  Now  has pulmonary mets and esophageal stricture  Unable to eat    Will need surgical placement of J-tube and then plan for chemo per Dr. Delacruz

## 2018-12-24 LAB
ANION GAP SERPL CALC-SCNC: 14 MMOL/L — SIGNIFICANT CHANGE UP (ref 5–17)
APTT BLD: 30.8 SEC — SIGNIFICANT CHANGE UP (ref 27.5–36.3)
BLD GP AB SCN SERPL QL: SIGNIFICANT CHANGE UP
BUN SERPL-MCNC: 20 MG/DL — SIGNIFICANT CHANGE UP (ref 8–20)
CALCIUM SERPL-MCNC: 9.1 MG/DL — SIGNIFICANT CHANGE UP (ref 8.6–10.2)
CHLORIDE SERPL-SCNC: 97 MMOL/L — LOW (ref 98–107)
CO2 SERPL-SCNC: 28 MMOL/L — SIGNIFICANT CHANGE UP (ref 22–29)
CREAT SERPL-MCNC: 0.64 MG/DL — SIGNIFICANT CHANGE UP (ref 0.5–1.3)
GLUCOSE SERPL-MCNC: 67 MG/DL — LOW (ref 70–115)
HCT VFR BLD CALC: 27.2 % — LOW (ref 42–52)
HGB BLD-MCNC: 8.2 G/DL — LOW (ref 14–18)
INR BLD: 1.28 RATIO — HIGH (ref 0.88–1.16)
MCHC RBC-ENTMCNC: 28.7 PG — SIGNIFICANT CHANGE UP (ref 27–31)
MCHC RBC-ENTMCNC: 30.1 G/DL — LOW (ref 32–36)
MCV RBC AUTO: 95.1 FL — HIGH (ref 80–94)
PLATELET # BLD AUTO: 357 K/UL — SIGNIFICANT CHANGE UP (ref 150–400)
POTASSIUM SERPL-MCNC: 4.6 MMOL/L — SIGNIFICANT CHANGE UP (ref 3.5–5.3)
POTASSIUM SERPL-SCNC: 4.6 MMOL/L — SIGNIFICANT CHANGE UP (ref 3.5–5.3)
PROTHROM AB SERPL-ACNC: 14.8 SEC — HIGH (ref 10–12.9)
RBC # BLD: 2.86 M/UL — LOW (ref 4.6–6.2)
RBC # FLD: 15.3 % — SIGNIFICANT CHANGE UP (ref 11–15.6)
SODIUM SERPL-SCNC: 139 MMOL/L — SIGNIFICANT CHANGE UP (ref 135–145)
TYPE + AB SCN PNL BLD: SIGNIFICANT CHANGE UP
WBC # BLD: 11.1 K/UL — HIGH (ref 4.8–10.8)
WBC # FLD AUTO: 11.1 K/UL — HIGH (ref 4.8–10.8)

## 2018-12-24 PROCEDURE — 99232 SBSQ HOSP IP/OBS MODERATE 35: CPT

## 2018-12-24 RX ORDER — IPRATROPIUM/ALBUTEROL SULFATE 18-103MCG
3 AEROSOL WITH ADAPTER (GRAM) INHALATION EVERY 6 HOURS
Qty: 0 | Refills: 0 | Status: DISCONTINUED | OUTPATIENT
Start: 2018-12-24 | End: 2019-01-01

## 2018-12-24 RX ORDER — PANTOPRAZOLE SODIUM 20 MG/1
40 TABLET, DELAYED RELEASE ORAL DAILY
Qty: 0 | Refills: 0 | Status: DISCONTINUED | OUTPATIENT
Start: 2018-12-24 | End: 2018-12-27

## 2018-12-24 RX ORDER — TIOTROPIUM BROMIDE 18 UG/1
1 CAPSULE ORAL; RESPIRATORY (INHALATION) DAILY
Qty: 0 | Refills: 0 | Status: DISCONTINUED | OUTPATIENT
Start: 2018-12-24 | End: 2018-12-24

## 2018-12-24 RX ORDER — LEVOTHYROXINE SODIUM 125 MCG
12.5 TABLET ORAL AT BEDTIME
Qty: 0 | Refills: 0 | Status: DISCONTINUED | OUTPATIENT
Start: 2018-12-24 | End: 2018-12-27

## 2018-12-24 RX ORDER — IPRATROPIUM/ALBUTEROL SULFATE 18-103MCG
3 AEROSOL WITH ADAPTER (GRAM) INHALATION EVERY 6 HOURS
Qty: 0 | Refills: 0 | Status: DISCONTINUED | OUTPATIENT
Start: 2018-12-24 | End: 2018-12-24

## 2018-12-24 RX ORDER — ALBUTEROL 90 UG/1
1 AEROSOL, METERED ORAL EVERY 4 HOURS
Qty: 0 | Refills: 0 | Status: DISCONTINUED | OUTPATIENT
Start: 2018-12-24 | End: 2018-12-24

## 2018-12-24 RX ADMIN — Medication 3 MILLILITER(S): at 08:24

## 2018-12-24 RX ADMIN — PIPERACILLIN AND TAZOBACTAM 25 GRAM(S): 4; .5 INJECTION, POWDER, LYOPHILIZED, FOR SOLUTION INTRAVENOUS at 06:05

## 2018-12-24 RX ADMIN — Medication 12.5 MICROGRAM(S): at 21:29

## 2018-12-24 RX ADMIN — SODIUM CHLORIDE 75 MILLILITER(S): 9 INJECTION, SOLUTION INTRAVENOUS at 13:33

## 2018-12-24 RX ADMIN — PIPERACILLIN AND TAZOBACTAM 25 GRAM(S): 4; .5 INJECTION, POWDER, LYOPHILIZED, FOR SOLUTION INTRAVENOUS at 21:29

## 2018-12-24 RX ADMIN — PIPERACILLIN AND TAZOBACTAM 25 GRAM(S): 4; .5 INJECTION, POWDER, LYOPHILIZED, FOR SOLUTION INTRAVENOUS at 13:32

## 2018-12-24 NOTE — CHART NOTE - NSCHARTNOTEFT_GEN_A_CORE
Source: EMR    Current Diet: NPO (day 2)  Pt failed 2 SLP evals --recs to stay NPO with alternate means of nutrition     Possible plan for open gastrectomy vs jejunectomy ??    Current Weight: (12/19) 70kg    % Weight Change     Pertinent Medications: MEDICATIONS  (STANDING):  ALBUTerol/ipratropium for Nebulization 3 milliLiter(s) Nebulizer every 6 hours  amLODIPine   Tablet 5 milliGRAM(s) Oral daily  aspirin enteric coated 81 milliGRAM(s) Oral daily  enoxaparin Injectable 40 milliGRAM(s) SubCutaneous daily  lactated ringers. 1000 milliLiter(s) (75 mL/Hr) IV Continuous <Continuous>  levothyroxine 25 MICROGram(s) Oral daily  pantoprazole    Tablet 40 milliGRAM(s) Oral before breakfast  piperacillin/tazobactam IVPB. 3.375 Gram(s) IV Intermittent every 8 hours  simvastatin 10 milliGRAM(s) Oral at bedtime    MEDICATIONS  (PRN):    Pertinent Labs: CBC Full  -  ( 24 Dec 2018 07:08 )  WBC Count : 11.1 K/uL  Hemoglobin : 8.2 g/dL  Hematocrit : 27.2 %  Platelet Count - Automated : 357 K/uL  Mean Cell Volume : 95.1 fl  Mean Cell Hemoglobin : 28.7 pg  Mean Cell Hemoglobin Concentration : 30.1 g/dL    Skin: intact     Nutrition focused physical exam previously conducted - found signs of malnutrition [ ]absent [x ]present    Subcutaneous fat loss: [ ] Orbital fat pads region, [x ]Buccal fat region, [ ]Triceps region,  [ ]Ribs region    Muscle wasting: [ x]Temples region, [ x]Clavicle region, [ ]Shoulder region, [ ]Scapula region, [ ]Interosseous region,  [ ]thigh region, [ ]Calf region    Estimated Needs:   [ x] no change since previous assessment  [ ] recalculated:     Current Nutrition Diagnosis:·  Malnutrition; Severe related to inadequate protein-energy intake in the setting of esophageal cancer with dysphagia as evidenced by poor po intake pta, 5.9% unintentional wt loss x 5 weeks, severe muscle/fat wasting.      Recommendations: · Enteral Nutrition: As medically feasible start enteral feeds of Jevity 1.5cal at 20ml/hr, increase by 10ml/hr every 8 hours to goal rate 50ml/hr x 20 hours (1,000ml/daily) to provide 1,500kcal and 64 grams protein daily.      Monitoring and Evaluation:   [ ] PO intake [x ] Tolerance to diet prescription [X] Weights  [X] Follow up per protocol [X] Labs: Source: EMR    Current Diet: NPO (day 2)  Pt failed 2 SLP evals --recs to stay NPO with alternate means of nutrition   per md---Worsening Dysphagia likely from Esophageal stricture from progression of esophageal cancer  GI called, Failed dysphagia screen, Continue to keep NPO- Per GI unable to pass scope for PEG placement- recommend surgery consult. ACS consulted. ACS is requesting ethics consult prior to considering J tube placement.     Possible plan for open gastrectomy vs jejunectomy ??    Current Weight: (12/19) 70kg    % Weight Change     Pertinent Medications: MEDICATIONS  (STANDING):  ALBUTerol/ipratropium for Nebulization 3 milliLiter(s) Nebulizer every 6 hours  amLODIPine   Tablet 5 milliGRAM(s) Oral daily  aspirin enteric coated 81 milliGRAM(s) Oral daily  enoxaparin Injectable 40 milliGRAM(s) SubCutaneous daily  lactated ringers. 1000 milliLiter(s) (75 mL/Hr) IV Continuous <Continuous>  levothyroxine 25 MICROGram(s) Oral daily  pantoprazole    Tablet 40 milliGRAM(s) Oral before breakfast  piperacillin/tazobactam IVPB. 3.375 Gram(s) IV Intermittent every 8 hours  simvastatin 10 milliGRAM(s) Oral at bedtime    MEDICATIONS  (PRN):    Pertinent Labs: CBC Full  -  ( 24 Dec 2018 07:08 )  WBC Count : 11.1 K/uL  Hemoglobin : 8.2 g/dL  Hematocrit : 27.2 %  Platelet Count - Automated : 357 K/uL  Mean Cell Volume : 95.1 fl  Mean Cell Hemoglobin : 28.7 pg  Mean Cell Hemoglobin Concentration : 30.1 g/dL    Skin: intact     Nutrition focused physical exam previously conducted - found signs of malnutrition [ ]absent [x ]present    Subcutaneous fat loss: [ ] Orbital fat pads region, [x ]Buccal fat region, [ ]Triceps region,  [ ]Ribs region    Muscle wasting: [ x]Temples region, [ x]Clavicle region, [ ]Shoulder region, [ ]Scapula region, [ ]Interosseous region,  [ ]thigh region, [ ]Calf region    Estimated Needs:   [ x] no change since previous assessment  [ ] recalculated:     Current Nutrition Diagnosis:·  Malnutrition; Severe related to inadequate protein-energy intake in the setting of esophageal cancer with dysphagia as evidenced by poor po intake pta, 5.9% unintentional wt loss x 5 weeks, severe muscle/fat wasting.      Recommendations: · Enteral Nutrition: As medically feasible start enteral feeds of Jevity 1.5cal at 20ml/hr, increase by 10ml/hr every 8 hours to goal rate 50ml/hr x 20 hours (1,000ml/daily) to provide 1,500kcal and 64 grams protein daily.      Monitoring and Evaluation:   [ ] PO intake [x ] Tolerance to diet prescription [X] Weights  [X] Follow up per protocol [X] Labs:

## 2018-12-24 NOTE — PROGRESS NOTE ADULT - ASSESSMENT
74 y/o Nepali speaking male, with PMH of head and neck cancer s/p left sided facial surgery for tumor resection (2007), Esophageal cancer with esophageal stricture s/p esophageal stent (with Dr. Herring) who received concurrent chemo/XRT with cisplatin/5 flurouracil with his last chemo therapy treatment being 1 month ago with Dr. Delacruz. He was admitted in November for chest pain and dysphagia. EGD showed esophageal stent stenosis. He was advised a full liquid diet. CT chest was consistent with bilateral nodules concerning for mets. He returned to the Er for sob, dysphagia and cough.   Positive for RVP rhinovirus. He failed initial dysphagia screen. Was seen by GI, recommend alterative means of nutrition     Assessment/Plan:    1. SOB secondary to + Rhinovirus and worsening lung mets.   Supportive care  IVF  Duonebs  O2 prn  IV zosyn day 5/7  for Gram negative aspiration pneumonia - Sputum culture positive for Klebsiella and staph   Blood and urine cultures negative       2. Worsening Dysphagia likely from Esophageal stricture from progression of esophageal cancer  GI called, Failed dysphagia screen, Continue to keep NPO- Per GI unable to pass scope for PEG placement- recommend surgery consult. ACS consulted. ACS is requesting ethics consult prior to considering J tube placement.     The patient does have metastatic esophageal cancer. He is at this time alert and oriented and requesting a feeding tube so that he could possibly go home with hospice. Given his metastatic cancer he has a poor prognosis but at this time he is not immanently dying.       3. Malignant neoplasm of esophagus with likely mets to lung  Heme/onc called for recommendations regarding need for further chemotherapy and prognosis.    seen by hospice and palliative team. patient DNR/DNI    4, Hypothyroidism: Continue synthroid    5.Hypertension: Stable    6. Severe protein calorie malnutrition.      DVT ppx: lovenox  GI: PPI  Diet: NPO for risk of aspiration 72 y/o Indonesian speaking male, with PMH of head and neck cancer s/p left sided facial surgery for tumor resection (2007), Esophageal cancer with esophageal stricture s/p esophageal stent (with Dr. Herring) who received concurrent chemo/XRT with cisplatin/5 flurouracil with his last chemo therapy treatment being 1 month ago with Dr. Delacruz. He was admitted in November for chest pain and dysphagia. EGD showed esophageal stent stenosis. He was advised a full liquid diet. CT chest was consistent with bilateral nodules concerning for mets. He returned to the Er for sob, dysphagia and cough.   Positive for RVP rhinovirus. He failed initial dysphagia screen. Was seen by GI, recommend alterative means of nutrition     Assessment/Plan:    1. SOB secondary to + Rhinovirus and worsening lung mets.   Supportive care  IVF  Duonebs  O2 prn  IV zosyn day 5/7  for Gram negative aspiration pneumonia - Sputum culture positive for Klebsiella and staph   Blood and urine cultures negative       2. Worsening Dysphagia likely from Esophageal stricture from progression of esophageal cancer  GI called, Failed dysphagia screen, Continue to keep NPO- Per GI unable to pass scope for PEG placement- recommend surgery consult. ACS consulted. ACS is requesting ethics consult prior to considering J tube placement.     The patient does have metastatic esophageal cancer. He is at this time alert and oriented and requesting a feeding tube so that he could possibly go home with hospice. Given his metastatic cancer he may have a  poor prognosis but at this time he is not immanently dying. I spoke with oncology, if feeding tube is placed patient is still a candidate for further chemotherapy. I believe it is unethical to not provide the patient with a means of nutrition.       3. Malignant neoplasm of esophagus with likely mets to lung  Heme/onc called for recommendations regarding need for further chemotherapy and prognosis.    seen by hospice and palliative team. patient DNR/DNI    4, Hypothyroidism: Continue synthroid    5.Hypertension: Stable    6. Severe protein calorie malnutrition.      DVT ppx: lovenox  GI: PPI  Diet: NPO for risk of aspiration 72 y/o Bulgarian speaking male, with PMH of head and neck cancer s/p left sided facial surgery for tumor resection (2007), Esophageal cancer with esophageal stricture s/p esophageal stent (with Dr. Herring) who received concurrent chemo/XRT with cisplatin/5 flurouracil with his last chemo therapy treatment being 1 month ago with Dr. Delacruz. He was admitted in November for chest pain and dysphagia. EGD showed esophageal stent stenosis. He was advised a full liquid diet. CT chest was consistent with bilateral nodules concerning for mets. He returned to the Er for sob, dysphagia and cough.   Positive for RVP rhinovirus. He failed initial dysphagia screen. Was seen by GI, recommend alterative means of nutrition     Assessment/Plan:    1. SOB secondary to + Rhinovirus and worsening lung mets.   Supportive care  IVF  Duonebs  O2 prn  IV zosyn day 5/7  for Gram negative aspiration pneumonia - Sputum culture positive for Klebsiella and staph   Blood and urine cultures negative       2. Worsening Dysphagia likely from Esophageal stricture from progression of esophageal cancer  GI called, Failed dysphagia screen, Continue to keep NPO- Per GI unable to pass scope for PEG placement- recommend surgery consult. ACS consulted. ACS is requesting ethics consult prior to considering J tube placement.     The patient does have metastatic esophageal cancer. He is at this time alert and oriented and requesting a feeding tube so that he could possibly go home with hospice. Given his metastatic cancer he may have a  poor prognosis but at this time he is not immanently dying. I spoke with oncology, if feeding tube is placed patient is still a candidate for further chemotherapy. I believe it is unethical to not provide the patient with a means of nutrition.     I spoke with IR, scheduled for G tube placement on Wednesday tentatively.       3. Malignant neoplasm of esophagus with likely mets to lung  Heme/onc called for recommendations regarding need for further chemotherapy and prognosis.    seen by hospice and palliative team. patient DNR/DNI    4, Hypothyroidism: IV synthroid     5.Hypertension: Stable    6. Severe protein calorie malnutrition.      DVT ppx: lovenox  GI: PPI  Diet: NPO for risk of aspiration

## 2018-12-24 NOTE — PROGRESS NOTE ADULT - ASSESSMENT
73M with metastatic esophageal carcinoma in need of feeding access with plans for on going chemotherapy by oncology -- GI unable to pass endoscope past distal obstruction of esophagus for PEG    Problem: Dysphagia, Recommendation:     - tentatively scheduled for open gastrostomy vs jejunostomy tube placement 12/24/18.  -NPO/IVF  -will need to obtain consent from HCP Brice [phone]  -type/screen  -dvt ppx

## 2018-12-24 NOTE — PROGRESS NOTE ADULT - SUBJECTIVE AND OBJECTIVE BOX
Patient is a 73y Male seen in a FU of esophageal cancer.     He has hx of  laryngeal cancer in 2006 with a T4 lesion, 3 plus cervical nodes, treated with surgery and concurrent chemo/RT; finished treatment in 5/2007.       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  He has received treatment  with concurrent  CDDP/5-FU and RT recently.  A FU PET scan showed pulmonary lesions, he was sent to Dr. Abebe for a Bx but it was thought he had metastatic esophageal cancer and a bx was not warranted.    Had admission to evaluate for chest pain, difficulty swallowing.  EGD showed narrowing.  Recommendations included diet of full liquids as could not tolerate solids but is unable to swallow due to esophageal narrowing.  Pulmonary nodules noted on CT chest.  No biopsy recommended for lung lesion last admission.  Returns to ER c/o SOB, cough productive of yellow sputum,. pain  in sternum with coughing.  Difficulty swallowing.  Noted to have progressive lung nodules. Possible superimposed infection.  Denies nausea or vomiting, fevers, chills, sweats or night sweats, headaches or dizziness.   Unable to eat only for a few days PTA.      He has been NPO, underwent EGD  which demonstrated complete occlusion.     PAST MEDICAL & SURGICAL HISTORY:  Malignant neoplasm of esophagus, unspecified location  High cholesterol  Hypothyroid  HTN (hypertension)  Head and neck cancer: 2007  History of head and neck cancer    MEDICATIONS  (STANDING):  ALBUTerol/ipratropium for Nebulization 3 milliLiter(s) Nebulizer every 6 hours  amLODIPine   Tablet 5 milliGRAM(s) Oral daily  aspirin enteric coated 81 milliGRAM(s) Oral daily  enoxaparin Injectable 40 milliGRAM(s) SubCutaneous daily  lactated ringers. 1000 milliLiter(s) (75 mL/Hr) IV Continuous <Continuous>  levothyroxine 25 MICROGram(s) Oral daily  pantoprazole    Tablet 40 milliGRAM(s) Oral before breakfast  piperacillin/tazobactam IVPB. 3.375 Gram(s) IV Intermittent every 8 hours  simvastatin 10 milliGRAM(s) Oral at bedtime    Vital Signs Last 24 Hrs  T(C): 36.4 (24 Dec 2018 07:32), Max: 37.1 (24 Dec 2018 00:02)  T(F): 97.6 (24 Dec 2018 07:32), Max: 98.7 (24 Dec 2018 00:02)  HR: 92 (24 Dec 2018 07:32) (71 - 92)  BP: 122/64 (24 Dec 2018 07:32) (121/66 - 122/64)  BP(mean): --  RR: 16 (24 Dec 2018 07:32) (16 - 16)  SpO2: 93% (24 Dec 2018 07:32) (93% - 93%)      P/E: Alert and oriented  Pale  No icterus  No cervical LNs  Lungs: Diminished BS but clear  Heart: RR  Abd: Non-tender  No palpable HSM  Exts: Cachectic.    CBC Full  -  ( 24 Dec 2018 07:08 )  WBC Count : 11.1 K/uL  Hemoglobin : 8.2 g/dL  Hematocrit : 27.2 %  Platelet Count - Automated : 357 K/uL  Mean Cell Volume : 95.1 fl  Mean Cell Hemoglobin : 28.7 pg  Mean Cell Hemoglobin Concentration : 30.1 g/dL  Auto Neutrophil # : x  Auto Lymphocyte # : x  Auto Monocyte # : x  Auto Eosinophil # : x  Auto Basophil # : x  Auto Neutrophil % : x  Auto Lymphocyte % : x  Auto Monocyte % : x  Auto Eosinophil % : x  Auto Basophil % : x      12-24    139  |  97<L>  |  20.0  ----------------------------<  67<L>  4.6   |  28.0  |  0.64    Ca    9.1      24 Dec 2018 07:08          Assessment: Metastatic esophageal cancer  Had concurrent chemo/RT with CDDP and CI 5 FU  Now  has pulmonary mets and esophageal stricture  Unable to eat.  Is alert and oriented  and not  confused or unconscious.  Recommend some for of tube feedings, if GI cannot place a PEG, suggest surgical placement of  G or J tube as deemed appropriate by them.  Chemo following the beginning of nutrition.      Thank you.

## 2018-12-24 NOTE — PROGRESS NOTE ADULT - SUBJECTIVE AND OBJECTIVE BOX
INTERVAL HPI/OVERNIGHT EVENTS:    NAOE    SUBJECTIVE:    Patient most likely to OR today for G tube placement.    Feeling well this morning; NPO since midnight although patient cannot take PO at baseline. Passing gas and BMs. Denies nausea and vomiting. Adequate UOP.      MEDICATIONS  (STANDING):  ALBUTerol/ipratropium for Nebulization 3 milliLiter(s) Nebulizer every 6 hours  amLODIPine   Tablet 5 milliGRAM(s) Oral daily  aspirin enteric coated 81 milliGRAM(s) Oral daily  enoxaparin Injectable 40 milliGRAM(s) SubCutaneous daily  lactated ringers. 1000 milliLiter(s) (75 mL/Hr) IV Continuous <Continuous>  levothyroxine 25 MICROGram(s) Oral daily  pantoprazole    Tablet 40 milliGRAM(s) Oral before breakfast  piperacillin/tazobactam IVPB. 3.375 Gram(s) IV Intermittent every 8 hours  simvastatin 10 milliGRAM(s) Oral at bedtime    MEDICATIONS  (PRN):      Vital Signs Last 24 Hrs  T(C): 37.1 (24 Dec 2018 00:02), Max: 37.1 (24 Dec 2018 00:02)  T(F): 98.7 (24 Dec 2018 00:02), Max: 98.7 (24 Dec 2018 00:02)  HR: 71 (24 Dec 2018 00:02) (55 - 71)  BP: 121/66 (24 Dec 2018 00:02) (121/66 - 139/66)  BP(mean): --  RR: --  SpO2: 100% (23 Dec 2018 15:26) (100% - 100%)    Physical Exam:  General: no acute distress, profound cachexia, AOx3  HEENT: PERRLA, EOMI, no drainage or redness. left posterior auricular region was large surgical scar well healed  Neck: No bruits; no thyromegaly or nodules,  No JVD.  Respiratory: Breath Sounds equal & clear to auscultation, no accessory muscle use  Cardiovascular: Regular rate & rhythm, normal S1, S2; no murmurs, gallops or rubs  Gastrointestinal: Soft, non-tender, nondistended. large vertical scar just to the left of midline  Extremities: No peripheral edema, No cyanosis, clubbing   Vascular: Equal and normal pulses: 2+ peripheral pulses throughout      I&O's Detail    23 Dec 2018 07:01  -  24 Dec 2018 07:00  --------------------------------------------------------  IN:    lactated ringers.: 900 mL  Total IN: 900 mL    OUT:    Voided: 175 mL  Total OUT: 175 mL    Total NET: 725 mL      LABS:                        8.2    11.1  )-----------( 357      ( 24 Dec 2018 07:08 )             27.2           PT/INR - ( 24 Dec 2018 07:08 )   PT: 14.8 sec;   INR: 1.28 ratio         PTT - ( 24 Dec 2018 07:08 )  PTT:30.8 sec      RADIOLOGY & ADDITIONAL STUDIES:

## 2018-12-24 NOTE — PROGRESS NOTE ADULT - SUBJECTIVE AND OBJECTIVE BOX
CC: Dysphagia     INTERVAL HPI/OVERNIGHT EVENTS: Patient seen and examined, no acute complaints overnight.       Vital Signs Last 24 Hrs  T(C): 36.4 (24 Dec 2018 07:32), Max: 37.1 (24 Dec 2018 00:02)  T(F): 97.6 (24 Dec 2018 07:32), Max: 98.7 (24 Dec 2018 00:02)  HR: 92 (24 Dec 2018 07:32) (56 - 92)  BP: 122/64 (24 Dec 2018 07:32) (121/66 - 139/66)  BP(mean): --  RR: 16 (24 Dec 2018 07:32) (16 - 16)  SpO2: 93% (24 Dec 2018 07:32) (93% - 100%)    PHYSICAL EXAM:    GENERAL: NAD, AOX3  HEAD:  Atraumatic, Normocephalic  CHEST/LUNG: Clear to auscultation bilaterally; No rales, rhonchi, wheezing, or rubs  HEART: Regular rate and rhythm; No murmurs, rubs, or gallops  ABDOMEN: Soft, Nontender, Nondistended; Bowel sounds present  EXTREMITIES:  2+ Peripheral Pulses, No clubbing, cyanosis, or edema        MEDICATIONS  (STANDING):  ALBUTerol/ipratropium for Nebulization 3 milliLiter(s) Nebulizer every 6 hours  amLODIPine   Tablet 5 milliGRAM(s) Oral daily  aspirin enteric coated 81 milliGRAM(s) Oral daily  enoxaparin Injectable 40 milliGRAM(s) SubCutaneous daily  lactated ringers. 1000 milliLiter(s) (75 mL/Hr) IV Continuous <Continuous>  levothyroxine 25 MICROGram(s) Oral daily  pantoprazole    Tablet 40 milliGRAM(s) Oral before breakfast  piperacillin/tazobactam IVPB. 3.375 Gram(s) IV Intermittent every 8 hours  simvastatin 10 milliGRAM(s) Oral at bedtime    MEDICATIONS  (PRN):      Allergies    No Known Allergies    Intolerances          LABS:                          8.2    11.1  )-----------( 357      ( 24 Dec 2018 07:08 )             27.2     12-24    139  |  97<L>  |  20.0  ----------------------------<  67<L>  4.6   |  28.0  |  0.64    Ca    9.1      24 Dec 2018 07:08      PT/INR - ( 24 Dec 2018 07:08 )   PT: 14.8 sec;   INR: 1.28 ratio         PTT - ( 24 Dec 2018 07:08 )  PTT:30.8 sec      RADIOLOGY & ADDITIONAL TESTS:

## 2018-12-25 LAB
ABO RH CONFIRMATION: SIGNIFICANT CHANGE UP
ANION GAP SERPL CALC-SCNC: 15 MMOL/L — SIGNIFICANT CHANGE UP (ref 5–17)
BUN SERPL-MCNC: 21 MG/DL — HIGH (ref 8–20)
CALCIUM SERPL-MCNC: 9.3 MG/DL — SIGNIFICANT CHANGE UP (ref 8.6–10.2)
CHLORIDE SERPL-SCNC: 95 MMOL/L — LOW (ref 98–107)
CO2 SERPL-SCNC: 28 MMOL/L — SIGNIFICANT CHANGE UP (ref 22–29)
CREAT SERPL-MCNC: 0.67 MG/DL — SIGNIFICANT CHANGE UP (ref 0.5–1.3)
CULTURE RESULTS: SIGNIFICANT CHANGE UP
CULTURE RESULTS: SIGNIFICANT CHANGE UP
GLUCOSE SERPL-MCNC: 71 MG/DL — SIGNIFICANT CHANGE UP (ref 70–115)
HCT VFR BLD CALC: 25.9 % — LOW (ref 42–52)
HGB BLD-MCNC: 8.1 G/DL — LOW (ref 14–18)
MCHC RBC-ENTMCNC: 29.3 PG — SIGNIFICANT CHANGE UP (ref 27–31)
MCHC RBC-ENTMCNC: 31.3 G/DL — LOW (ref 32–36)
MCV RBC AUTO: 93.8 FL — SIGNIFICANT CHANGE UP (ref 80–94)
PLATELET # BLD AUTO: 337 K/UL — SIGNIFICANT CHANGE UP (ref 150–400)
POTASSIUM SERPL-MCNC: 4.1 MMOL/L — SIGNIFICANT CHANGE UP (ref 3.5–5.3)
POTASSIUM SERPL-SCNC: 4.1 MMOL/L — SIGNIFICANT CHANGE UP (ref 3.5–5.3)
RBC # BLD: 2.76 M/UL — LOW (ref 4.6–6.2)
RBC # FLD: 15.6 % — SIGNIFICANT CHANGE UP (ref 11–15.6)
SODIUM SERPL-SCNC: 138 MMOL/L — SIGNIFICANT CHANGE UP (ref 135–145)
SPECIMEN SOURCE: SIGNIFICANT CHANGE UP
SPECIMEN SOURCE: SIGNIFICANT CHANGE UP
WBC # BLD: 10.4 K/UL — SIGNIFICANT CHANGE UP (ref 4.8–10.8)
WBC # FLD AUTO: 10.4 K/UL — SIGNIFICANT CHANGE UP (ref 4.8–10.8)

## 2018-12-25 PROCEDURE — 99232 SBSQ HOSP IP/OBS MODERATE 35: CPT

## 2018-12-25 RX ADMIN — PIPERACILLIN AND TAZOBACTAM 25 GRAM(S): 4; .5 INJECTION, POWDER, LYOPHILIZED, FOR SOLUTION INTRAVENOUS at 13:27

## 2018-12-25 RX ADMIN — PIPERACILLIN AND TAZOBACTAM 25 GRAM(S): 4; .5 INJECTION, POWDER, LYOPHILIZED, FOR SOLUTION INTRAVENOUS at 05:20

## 2018-12-25 RX ADMIN — PANTOPRAZOLE SODIUM 40 MILLIGRAM(S): 20 TABLET, DELAYED RELEASE ORAL at 14:59

## 2018-12-25 RX ADMIN — PIPERACILLIN AND TAZOBACTAM 25 GRAM(S): 4; .5 INJECTION, POWDER, LYOPHILIZED, FOR SOLUTION INTRAVENOUS at 21:03

## 2018-12-25 RX ADMIN — SODIUM CHLORIDE 75 MILLILITER(S): 9 INJECTION, SOLUTION INTRAVENOUS at 05:20

## 2018-12-25 RX ADMIN — Medication 12.5 MICROGRAM(S): at 21:03

## 2018-12-25 NOTE — PROGRESS NOTE ADULT - SUBJECTIVE AND OBJECTIVE BOX
INTERVAL HPI/OVERNIGHT EVENTS:    NAOE    SUBJECTIVE:  Feeling well this morning; Passing gas and BMs. Denies nausea and vomiting. Adequate UOP.    Vital Signs Last 24 Hrs  T(C): 37.2 (24 Dec 2018 23:51), Max: 37.2 (24 Dec 2018 23:51)  T(F): 99 (24 Dec 2018 23:51), Max: 99 (24 Dec 2018 23:51)  HR: 62 (24 Dec 2018 23:51) (59 - 92)  BP: 137/62 (24 Dec 2018 23:51) (122/64 - 137/69)  BP(mean): --  RR: 18 (24 Dec 2018 23:51) (16 - 18)  SpO2: 100% (24 Dec 2018 18:47) (93% - 100%)    I&O's Detail    23 Dec 2018 07:01  -  24 Dec 2018 07:00  --------------------------------------------------------  IN:    lactated ringers.: 900 mL  Total IN: 900 mL    OUT:    Voided: 175 mL  Total OUT: 175 mL    Total NET: 725 mL          Physical Exam:  General: no acute distress, profound cachexia, AOx3  HEENT: PERRLA, EOMI, no drainage or redness. left posterior auricular region was large surgical scar well healed  Neck: No bruits; no thyromegaly or nodules,  No JVD.  Respiratory: Breath Sounds equal & clear to auscultation, no accessory muscle use  Cardiovascular: Regular rate & rhythm, normal S1, S2; no murmurs, gallops or rubs  Gastrointestinal: Soft, non-tender, nondistended. large vertical scar just to the left of midline  Extremities: No peripheral edema, No cyanosis, clubbing   Vascular: Equal and normal pulses: 2+ peripheral pulses throughout    LABS:                        8.2    11.1  )-----------( 357      ( 24 Dec 2018 07:08 )             27.2     12-24    139  |  97<L>  |  20.0  ----------------------------<  67<L>  4.6   |  28.0  |  0.64    Ca    9.1      24 Dec 2018 07:08      PT/INR - ( 24 Dec 2018 07:08 )   PT: 14.8 sec;   INR: 1.28 ratio         PTT - ( 24 Dec 2018 07:08 )  PTT:30.8 sec      MEDICATIONS  (STANDING):  enoxaparin Injectable 40 milliGRAM(s) SubCutaneous daily  lactated ringers. 1000 milliLiter(s) (75 mL/Hr) IV Continuous <Continuous>  levothyroxine Injectable 12.5 MICROGram(s) IV Push at bedtime  pantoprazole  Injectable 40 milliGRAM(s) IV Push daily  piperacillin/tazobactam IVPB. 3.375 Gram(s) IV Intermittent every 8 hours    MEDICATIONS  (PRN):  ALBUTerol/ipratropium for Nebulization 3 milliLiter(s) Nebulizer every 6 hours PRN Shortness of Breath and/or Wheezing      MICRO:   Cultures     STUDIES:   EKG, CXR, U/S, CT, MRI

## 2018-12-25 NOTE — PROGRESS NOTE ADULT - SUBJECTIVE AND OBJECTIVE BOX
Patient is a 73y Male seen in a FU of esophageal cancer.     He has hx of  laryngeal cancer in 2006 with a T4 lesion, 3 plus cervical nodes, treated with surgery and concurrent chemo/RT; finished treatment in 5/2007.       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  He has received treatment  with concurrent  CDDP/5-FU and RT recently.  A FU PET scan showed pulmonary lesions, he was sent to Dr. Abebe for a Bx but it was thought he had metastatic esophageal cancer and a bx was not warranted.    Had admission to evaluate for chest pain, difficulty swallowing.  EGD showed narrowing.  Recommendations included diet of full liquids as could not tolerate solids but is unable to swallow due to esophageal narrowing.  Pulmonary nodules noted on CT chest.  No biopsy recommended for lung lesion last admission.  Returns to ER c/o SOB, cough productive of yellow sputum,. pain  in sternum with coughing.  Difficulty swallowing.  Noted to have progressive lung nodules. Possible superimposed infection.  Denies nausea or vomiting, fevers, chills, sweats or night sweats, headaches or dizziness.   Unable to eat only for a few days PTA.      He has been NPO, underwent EGD  which demonstrated complete occlusion.       PAST MEDICAL & SURGICAL HISTORY:  Malignant neoplasm of esophagus, unspecified location  High cholesterol  Hypothyroid  HTN (hypertension)  Head and neck cancer: 2007  History of head and neck cancer    MEDICATIONS  (STANDING):  ALBUTerol/ipratropium for Nebulization 3 milliLiter(s) Nebulizer every 6 hours  amLODIPine   Tablet 5 milliGRAM(s) Oral daily  aspirin enteric coated 81 milliGRAM(s) Oral daily  enoxaparin Injectable 40 milliGRAM(s) SubCutaneous daily  lactated ringers. 1000 milliLiter(s) (75 mL/Hr) IV Continuous <Continuous>  levothyroxine 25 MICROGram(s) Oral daily  pantoprazole    Tablet 40 milliGRAM(s) Oral before breakfast  piperacillin/tazobactam IVPB. 3.375 Gram(s) IV Intermittent every 8 hours  simvastatin 10 milliGRAM(s) Oral at bedtime    Vital Signs Last 24 Hrs  T(C): 36.4 (24 Dec 2018 07:32), Max: 37.1 (24 Dec 2018 00:02)  T(F): 97.6 (24 Dec 2018 07:32), Max: 98.7 (24 Dec 2018 00:02)  HR: 92 (24 Dec 2018 07:32) (71 - 92)  BP: 122/64 (24 Dec 2018 07:32) (121/66 - 122/64)  BP(mean): --  RR: 16 (24 Dec 2018 07:32) (16 - 16)  SpO2: 93% (24 Dec 2018 07:32) (93% - 93%)      P/E: Alert and oriented; appears comfortable  Pale  No icterus  No cervical LNs  Lungs: Diminished BS but clear  Heart: RR  Abd: Non-tender  No palpable HSM  Exts: Cachectic.  CBC:  WBC:  10.4, H/H 8.1/25.9; plt ct 337,000  CBC Full  -  ( 24 Dec 2018 07:08 )  WBC Count : 11.1 K/uL  Hemoglobin : 8.2 g/dL  Hematocrit : 27.2 %  Platelet Count - Automated : 357 K/uL  Mean Cell Volume : 95.1 fl  Mean Cell Hemoglobin : 28.7 pg  Mean Cell Hemoglobin Concentration : 30.1 g/dL  Auto Neutrophil # : x  Auto Lymphocyte # : x  Auto Monocyte # : x  Auto Eosinophil # : x  Auto Basophil # : x  Auto Neutrophil % : x  Auto Lymphocyte % : x  Auto Monocyte % : x  Auto Eosinophil % : x  Auto Basophil % : x      12-24    139  |  97<L>  |  20.0  ----------------------------<  67<L>  4.6   |  28.0  |  0.64    Ca    9.1      24 Dec 2018 07:08          Assessment: Metastatic esophageal cancer  Had concurrent chemo/RT with CDDP and CI 5 FU  Now  has pulmonary mets and esophageal stricture  Unable to eat.  Is alert and oriented  and not  confused or unconscious.  Recommend some for of tube feedings, if GI cannot place a PEG, suggest surgical placement of  G or J tube as deemed appropriate by them.  Spoke with patient; he wants to pursue additional treatment and agrees to feeding tube  Chemo following the beginning of nutrition.      Thank you.

## 2018-12-25 NOTE — PROGRESS NOTE ADULT - ASSESSMENT
74 y/o Korean speaking male, with PMH of head and neck cancer s/p left sided facial surgery for tumor resection (2007), Esophageal cancer with esophageal stricture s/p esophageal stent (with Dr. Herring) who received concurrent chemo/XRT with cisplatin/5 flurouracil with his last chemo therapy treatment being 1 month ago with Dr. Delacruz. He was admitted in November for chest pain and dysphagia. EGD showed esophageal stent stenosis. He was advised a full liquid diet. CT chest was consistent with bilateral nodules concerning for mets. He returned to the Er for sob, dysphagia and cough.   Positive for RVP rhinovirus. He failed initial dysphagia screen. Was seen by GI, recommend alterative means of nutrition     Assessment/Plan:    1. SOB secondary to + Rhinovirus and worsening lung mets.   Supportive care  IVF  Duonebs  O2 prn  IV zosyn day 6  for Gram negative aspiration pneumonia - Sputum culture positive for Klebsiella and staph   Blood and urine cultures negative       2. Worsening Dysphagia likely from Esophageal stricture from progression of esophageal cancer  GI called, Failed dysphagia screen, Continue to keep NPO- Per GI unable to pass scope for PEG placement- recommend surgery consult. ACS consulted. ACS is requesting ethics consult prior to considering J tube placement.     The patient does have metastatic esophageal cancer. He is at this time alert and oriented and requesting a feeding tube so that he could possibly go home with hospice. Given his metastatic cancer he may have a  poor prognosis but at this time he is not immanently dying. I spoke with oncology, if feeding tube is placed patient is still a candidate for further chemotherapy. I believe it is unethical to not provide the patient with a means of nutrition.     I spoke with IR, scheduled for G tube placement on Wednesday tentatively.       3. Malignant neoplasm of esophagus with likely mets to lung  Per oncology will be a candidate for further chemotherapy once means of nutrition established given good response in the past.     4, Hypothyroidism: IV synthroid     5.Hypertension: Stable    6. Severe protein calorie malnutrition.      DVT Hold for G tube placement in am   Diet: NPO for risk of aspiration

## 2018-12-25 NOTE — PROGRESS NOTE ADULT - SUBJECTIVE AND OBJECTIVE BOX
CC: Follow up sob    INTERVAL HPI/OVERNIGHT EVENTS: Patient seen and examined, laying comfortably in bed. NO acute complaints overnight.       Vital Signs Last 24 Hrs  T(C): 36.8 (25 Dec 2018 07:31), Max: 37.2 (24 Dec 2018 23:51)  T(F): 98.2 (25 Dec 2018 07:31), Max: 99 (24 Dec 2018 23:51)  HR: 54 (25 Dec 2018 07:31) (54 - 62)  BP: 114/58 (25 Dec 2018 07:31) (114/58 - 137/69)  BP(mean): --  RR: 18 (25 Dec 2018 07:31) (18 - 18)  SpO2: 100% (24 Dec 2018 18:47) (100% - 100%)    PHYSICAL EXAM:    GENERAL: NAD, AOX3  NECK: Supple, No JVD  CHEST/LUNG: Clear to auscultation bilaterally; No rales, rhonchi, wheezing, or rubs  HEART: Regular rate and rhythm; No murmurs, rubs, or gallops  ABDOMEN: Soft, Nontender, Nondistended; Bowel sounds present  EXTREMITIES:  2+ Peripheral Pulses, No clubbing, cyanosis, or edema        MEDICATIONS  (STANDING):  lactated ringers. 1000 milliLiter(s) (75 mL/Hr) IV Continuous <Continuous>  levothyroxine Injectable 12.5 MICROGram(s) IV Push at bedtime  pantoprazole  Injectable 40 milliGRAM(s) IV Push daily  piperacillin/tazobactam IVPB. 3.375 Gram(s) IV Intermittent every 8 hours    MEDICATIONS  (PRN):  ALBUTerol/ipratropium for Nebulization 3 milliLiter(s) Nebulizer every 6 hours PRN Shortness of Breath and/or Wheezing      Allergies    No Known Allergies    Intolerances          LABS:                          8.1    10.4  )-----------( 337      ( 25 Dec 2018 02:26 )             25.9     12-25    138  |  95<L>  |  21.0<H>  ----------------------------<  71  4.1   |  28.0  |  0.67    Ca    9.3      25 Dec 2018 02:26      PT/INR - ( 24 Dec 2018 07:08 )   PT: 14.8 sec;   INR: 1.28 ratio         PTT - ( 24 Dec 2018 07:08 )  PTT:30.8 sec      RADIOLOGY & ADDITIONAL TESTS:

## 2018-12-25 NOTE — PROGRESS NOTE ADULT - ASSESSMENT
73M with metastatic esophageal carcinoma in need of feeding access with plans for on going chemotherapy by oncology.    -Recommend medical oncology to comment on treatment plan and life expectancy.  -Recommend ethics consult  -No acute surgical intervention at this time 73M with metastatic esophageal carcinoma in need of feeding access with plans for on going chemotherapy by oncology.    -Heme/Onc recommends placement of feeding tube by GI or Surgery. Will discuss plan with attending.   -Recommend ethics consult 73M with metastatic esophageal carcinoma in need of feeding access with plans for on going chemotherapy by oncology.    -Heme/Onc recommends placement of feeding tube by GI or Surgery. Will discuss plan with attending.   -Recommend ethics consult  - no surgical intervention necessary at this time. Please call with further questions.

## 2018-12-26 LAB
ANION GAP SERPL CALC-SCNC: 15 MMOL/L — SIGNIFICANT CHANGE UP (ref 5–17)
BUN SERPL-MCNC: 22 MG/DL — HIGH (ref 8–20)
CALCIUM SERPL-MCNC: 9.5 MG/DL — SIGNIFICANT CHANGE UP (ref 8.6–10.2)
CHLORIDE SERPL-SCNC: 96 MMOL/L — LOW (ref 98–107)
CO2 SERPL-SCNC: 28 MMOL/L — SIGNIFICANT CHANGE UP (ref 22–29)
CREAT SERPL-MCNC: 0.6 MG/DL — SIGNIFICANT CHANGE UP (ref 0.5–1.3)
GLUCOSE SERPL-MCNC: 75 MG/DL — SIGNIFICANT CHANGE UP (ref 70–115)
HCT VFR BLD CALC: 27.9 % — LOW (ref 42–52)
HGB BLD-MCNC: 8.6 G/DL — LOW (ref 14–18)
INR BLD: 1.37 RATIO — HIGH (ref 0.88–1.16)
MCHC RBC-ENTMCNC: 29.4 PG — SIGNIFICANT CHANGE UP (ref 27–31)
MCHC RBC-ENTMCNC: 30.8 G/DL — LOW (ref 32–36)
MCV RBC AUTO: 95.2 FL — HIGH (ref 80–94)
PLATELET # BLD AUTO: 380 K/UL — SIGNIFICANT CHANGE UP (ref 150–400)
POTASSIUM SERPL-MCNC: 4.1 MMOL/L — SIGNIFICANT CHANGE UP (ref 3.5–5.3)
POTASSIUM SERPL-SCNC: 4.1 MMOL/L — SIGNIFICANT CHANGE UP (ref 3.5–5.3)
PROTHROM AB SERPL-ACNC: 15.9 SEC — HIGH (ref 10–12.9)
RBC # BLD: 2.93 M/UL — LOW (ref 4.6–6.2)
RBC # FLD: 15.5 % — SIGNIFICANT CHANGE UP (ref 11–15.6)
SODIUM SERPL-SCNC: 139 MMOL/L — SIGNIFICANT CHANGE UP (ref 135–145)
WBC # BLD: 10.3 K/UL — SIGNIFICANT CHANGE UP (ref 4.8–10.8)
WBC # FLD AUTO: 10.3 K/UL — SIGNIFICANT CHANGE UP (ref 4.8–10.8)

## 2018-12-26 PROCEDURE — 99232 SBSQ HOSP IP/OBS MODERATE 35: CPT

## 2018-12-26 PROCEDURE — 99222 1ST HOSP IP/OBS MODERATE 55: CPT

## 2018-12-26 PROCEDURE — 49440 PLACE GASTROSTOMY TUBE PERC: CPT

## 2018-12-26 RX ORDER — ACETAMINOPHEN 500 MG
1000 TABLET ORAL ONCE
Qty: 0 | Refills: 0 | Status: COMPLETED | OUTPATIENT
Start: 2018-12-26 | End: 2018-12-26

## 2018-12-26 RX ORDER — ENOXAPARIN SODIUM 100 MG/ML
40 INJECTION SUBCUTANEOUS DAILY
Qty: 0 | Refills: 0 | Status: DISCONTINUED | OUTPATIENT
Start: 2018-12-27 | End: 2019-01-01

## 2018-12-26 RX ADMIN — PANTOPRAZOLE SODIUM 40 MILLIGRAM(S): 20 TABLET, DELAYED RELEASE ORAL at 13:36

## 2018-12-26 RX ADMIN — Medication 12.5 MICROGRAM(S): at 22:20

## 2018-12-26 RX ADMIN — PIPERACILLIN AND TAZOBACTAM 25 GRAM(S): 4; .5 INJECTION, POWDER, LYOPHILIZED, FOR SOLUTION INTRAVENOUS at 13:35

## 2018-12-26 RX ADMIN — SODIUM CHLORIDE 75 MILLILITER(S): 9 INJECTION, SOLUTION INTRAVENOUS at 13:36

## 2018-12-26 RX ADMIN — PIPERACILLIN AND TAZOBACTAM 25 GRAM(S): 4; .5 INJECTION, POWDER, LYOPHILIZED, FOR SOLUTION INTRAVENOUS at 05:47

## 2018-12-26 RX ADMIN — Medication 400 MILLIGRAM(S): at 18:05

## 2018-12-26 RX ADMIN — PIPERACILLIN AND TAZOBACTAM 25 GRAM(S): 4; .5 INJECTION, POWDER, LYOPHILIZED, FOR SOLUTION INTRAVENOUS at 22:21

## 2018-12-26 NOTE — PROGRESS NOTE ADULT - ASSESSMENT
74 y/o German speaking male, with PMH of head and neck cancer s/p left sided facial surgery for tumor resection (2007), Esophageal cancer with esophageal stricture s/p esophageal stent (with Dr. Herring) who received concurrent chemo/XRT with cisplatin/5 flurouracil with his last chemo therapy treatment being 1 month ago with Dr. Delacruz. He was admitted in November for chest pain and dysphagia. EGD showed esophageal stent stenosis. He was advised a full liquid diet. CT chest was consistent with bilateral nodules concerning for mets. He returned to the Er for sob, dysphagia and cough.   Positive for RVP rhinovirus. He failed initial dysphagia screen. Was seen by GI, recommend alterative means of nutrition     Assessment/Plan:    1. SOB secondary to + Rhinovirus and worsening lung mets.   Supportive care  IVF  Duonebs  O2 prn  IV zosyn day 7/7  for Gram negative aspiration pneumonia - Sputum culture positive for Klebsiella and staph   Blood and urine cultures negative       2. Worsening Dysphagia likely from Esophageal stricture from progression of esophageal cancer    GI called, Failed dysphagia screen, Continue to keep NPO- Per GI unable to pass scope for PEG placement- recommend surgery consult. ACS consulted. ACS is requesting ethics consult prior to considering J tube placement.  I spoke with IR, they will attempt G tube placement today- an NG tube will be attempted via fluoroscopy IF it fails will reach out to ACS again in regards to placing J tube. Ethics was consulted and I spoke with Dr Kaur who will document his recommendations.     The patient does have metastatic esophageal cancer. He is at this time alert and oriented and requesting a feeding tube so that he could possibly go home with hospice. Given his metastatic cancer he may have a  poor prognosis but at this time he is not immanently dying. I spoke with oncology, if feeding tube is placed patient is still a candidate for further chemotherapy. I believe it is unethical to not provide the patient with a means of nutrition.       3. Malignant neoplasm of esophagus with likely mets to lung  Per oncology will be a candidate for further chemotherapy once means of nutrition established given good response in the past.     4, Hypothyroidism: IV synthroid     5.Hypertension: Stable    6. Severe protein calorie malnutrition.      DVT Hold for G tube placement  Diet: NPO for risk of aspiration

## 2018-12-26 NOTE — PROGRESS NOTE ADULT - SUBJECTIVE AND OBJECTIVE BOX
Araceli/Onc. FU.    Patient is a 73y Male seen in a FU of esophageal cancer.     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  He has received treatment  with concurrent  CDDP/5-FU and RT recently.  A FU PET scan showed pulmonary lesions, he was sent to Dr. Abebe for a Bx but it was thought he had metastatic esophageal cancer and a bx was not warranted.    Had admission to evaluate for chest pain, difficulty swallowing.  EGD showed narrowing.  Recommendations included diet of full liquids as could not tolerate solids but is unable to swallow due to esophageal narrowing.  Pulmonary nodules noted on CT chest.  No biopsy recommended for lung lesion last admission.  Returns to ER c/o SOB, cough productive of yellow sputum,. pain  in sternum with coughing.  Difficulty swallowing.  Noted to have progressive lung nodules. Possible superimposed infection.  Denies nausea or vomiting, fevers, chills, sweats or night sweats, headaches or dizziness.   Unable to eat only for a few days PTA.      He has been NPO, underwent EGD  which demonstrated complete occlusion.      Patient is alert and oriented and wants to be fed, hae also wants to be treated for hiss cancer.    PAST MEDICAL & SURGICAL HISTORY:  Malignant neoplasm of esophagus, unspecified location  High cholesterol  Hypothyroid  HTN (hypertension)  Head and neck cancer: 2007  History of head and neck cancer    MEDICATIONS  (STANDING):  ALBUTerol/ipratropium for Nebulization 3 milliLiter(s) Nebulizer every 6 hours  amLODIPine   Tablet 5 milliGRAM(s) Oral daily  aspirin enteric coated 81 milliGRAM(s) Oral daily  enoxaparin Injectable 40 milliGRAM(s) SubCutaneous daily  lactated ringers. 1000 milliLiter(s) (75 mL/Hr) IV Continuous <Continuous>  levothyroxine 25 MICROGram(s) Oral daily  pantoprazole    Tablet 40 milliGRAM(s) Oral before breakfast  piperacillin/tazobactam IVPB. 3.375 Gram(s) IV Intermittent every 8 hours  simvastatin 10 milliGRAM(s) Oral at bedtime    Vital Signs Last 24 Hrs  T(C): 36.7 (26 Dec 2018 07:55), Max: 36.8 (25 Dec 2018 20:05)  T(F): 98 (26 Dec 2018 07:55), Max: 98.2 (25 Dec 2018 20:05)  HR: 54 (26 Dec 2018 07:55) (54 - 62)  BP: 125/63 (26 Dec 2018 07:55) (125/63 - 125/67)  BP(mean): --  RR: 18 (26 Dec 2018 07:55) (18 - 18)  SpO2: 99% (26 Dec 2018 07:55) (99% - 99%)    P/E: Alert and oriented; appears comfortable  Pale  No icterus  No cervical LNs  Lungs: Diminished BS but clear  Heart: RR  Abd: Non-tender  No palpable HSM  Exts: Cachectic.    CBC Full  -  ( 26 Dec 2018 07:06 )  WBC Count : 10.3 K/uL  Hemoglobin : 8.6 g/dL  Hematocrit : 27.9 %  Platelet Count - Automated : 380 K/uL  Mean Cell Volume : 95.2 fl  Mean Cell Hemoglobin : 29.4 pg  Mean Cell Hemoglobin Concentration : 30.8 g/dL  Auto Neutrophil # : x  Auto Lymphocyte # : x  Auto Monocyte # : x  Auto Eosinophil # : x  Auto Basophil # : x  Auto Neutrophil % : x  Auto Lymphocyte % : x  Auto Monocyte % : x  Auto Eosinophil % : x  Auto Basophil % : x    26 Dec 2018 07:06    139    |  96     |  22.0   ----------------------------<  75     4.1     |  28.0   |  0.60     Ca    9.5        26 Dec 2018 07:06        Assessment: Metastatic esophageal cancer  Had concurrent chemo/RT with CDDP and CI 5 FU  Now  has pulmonary mets and esophageal stricture  Unable to eat.  Is alert and oriented  and not  confused or unconscious.  Recommend some for of tube feedings, if GI cannot place a PEG, suggest surgical placement of  G or J tube as deemed appropriate by them.  Spoke with patient; he wants to pursue additional treatment and agrees to feeding tube  Chemo following the beginning of nutrition.  Dr. Sahu's note read  An attempt by IR to place a G tube first.    Thank you.

## 2018-12-26 NOTE — CONSULT NOTE ADULT - SUBJECTIVE AND OBJECTIVE BOX
HISTORY OF PRESENT ILLNESS: This is a 73-year-old man with a past medical history significant for head and neck cancer, esophageal cancer, hypothyroidism, HTN, and HLD who presented to the ED yesterday evening with a complaint of dysphagia and dyspnea.  He was recently admitted for dysphagia and underwent an EGD that showed a patent esophageal stent.  He was discharged on a liquid diet.  He reports that over the last few days, he has lost the ability to swallow much more than his own secretions.  Additionally, over the last few weeks, he has also developed dysphonia.  He reports that he is not currently receiving any therapy for his metastatic esophageal cancer and reports there are no plans to do so.  This past June 25th, he underwent an esophageal stent placement by Dr. Lopez here at [place].  He denies any associated epigastric abdominal pain.  Apart from feeling weak and being unable to eat, he voices no other complaints.  He has other admissions for failure to thrive.  While in the ED, he underwent a CT that demonstrated tumor overgrowth at both the proximal and distal ends of the stent.  Additionally noted were enlarging pulmonary nodules (enlarging from one month ago), all consistent with worsening metastatic disease.    REVIEW OF SYSTEMS: All other review of systems were completed and were otherwise negative save what is reported in the HPI.    PAST MEDICAL/SURGICAL HISTORY:  Malignant neoplasm of esophagus, unspecified location  High cholesterol  Hypothyroid  HTN (hypertension)  Head and neck cancer: 2007  History of head and neck cancer    SOCIAL HISTORY:  - TOBACCO: Former  - ALCOHOL: Denies  - ILLICIT DRUG USE: Denies    FAMILY HISTORY:  Family history of stomach cancer (Sibling)    HOME MEDICATIONS:  amLODIPine 5 mg oral tablet: 1 tab(s) orally once a day (23 Sep 2018 14:50)  aspirin 81 mg oral delayed release tablet: 1 tab(s) orally once a day (26 Aug 2018 09:25)  ferrous sulfate 325 mg (65 mg elemental iron) oral tablet: 1 tab(s) orally 2 times a day (31 Jul 2018 11:03)  levothyroxine 25 mcg (0.025 mg) oral tablet: 1 tab(s) orally once a day (26 Aug 2018 09:25)  pantoprazole 40 mg oral delayed release tablet: 1 tab(s) orally 2 times a day (26 Aug 2018 09:25)  simvastatin 10 mg oral tablet: 1 tab(s) orally once a day (at bedtime) (23 Sep 2018 14:45)    INPATIENT MEDICATIONS:  MEDICATIONS  (STANDING):    MEDICATIONS  (PRN):    ALLERGIES:  No Known Allergies    VITAL SIGNS LAST 24 HOURS:  T(C): 36.8 (20 Dec 2018 07:45), Max: 36.8 (20 Dec 2018 07:45)  T(F): 98.3 (20 Dec 2018 07:45), Max: 98.3 (20 Dec 2018 07:45)  HR: 67 (20 Dec 2018 07:45) (67 - 87)  BP: 99/52 (20 Dec 2018 07:45) (99/52 - 108/61)  BP(mean): 73 (20 Dec 2018 07:45) (73 - 73)  RR: 17 (20 Dec 2018 07:45) (17 - 20)  SpO2: 93% (20 Dec 2018 07:45) (93% - 97%)    PHYSICAL EXAM:  Constitutional: Severe cachexia   Eyes: Sclerae anicteric, conjunctivae normal  ENMT: Edentulous   Neck: No thyroid nodules appreciated, no significant cervical or supraclavicular lymphadenopathy  Respiratory: Breathing nonlabored; clear to auscultation  Cardiovascular: Regular rate and rhythm, holosystolic murmur  Gastrointestinal: Soft, scaphoid abdomen, nontender, nondistended, normoactive bowel sounds; no hepatosplenomegaly appreciated; no rebound tenderness or involuntary guarding  Extremities: No clubbing, cyanosis or edema  Neurological: Alert and oriented to person, place and time; no asterixis  Skin: No jaundice  Lymph Nodes: No significant lymphadenopathy  Musculoskeletal: Diffuse peripheral atrophy      LABS:                        8.4    12.3  )-----------( 331      ( 20 Dec 2018 00:25 )             27.5       12-20    139  |  98  |  19.0  ----------------------------<  93  4.2   |  30.0<H>  |  0.79    Ca    9.1      20 Dec 2018 00:25    TPro  7.4  /  Alb  3.2<L>  /  TBili  0.3<L>  /  DBili  x   /  AST  20  /  ALT  9   /  AlkPhos  58  12-20    LIVER FUNCTIONS - ( 20 Dec 2018 00:25 )  Alb: 3.2 g/dL / Pro: 7.4 g/dL / ALK PHOS: 58 U/L / ALT: 9 U/L / AST: 20 U/L / GGT: x           IMAGING: I personally reviewed the CT, and I agree with the radiologist's interpretation.
HPI: Patient is a 73y Male seen on consultation for the evaluation and management of Esophageal cancer.  Initially diagnosed in  2006  with laryngeal cancerwith T4 lesion, 3 plus cervical nodes, treated with surgery and concurrent chemo/RT; finished treatment in 5/2007.  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  He has received tx with concom CDDP/5-FU and RT.  Last treatment early November.  Had admission to evaluate for chest pain, difficulty swallowing.  EGD showed narrowing.  Recommendations included diet of full liwuids as could not tolerate solids.  New pulmonary nodules noted on CT chest.  No biopsy recommended for lung lesion last admission.  Returns to ER c/o SOB, cough productive of yellow sputum,. pain in sternum with coughing.  Difficulty swallowing.  Noted to have progressive lung nodules. Possible superimposed infection.  Denies nausea or vomiting, fevers, chills, sweats or night sweats, headaches or dizziness.     PAST MEDICAL & SURGICAL HISTORY:  Malignant neoplasm of esophagus, unspecified location  High cholesterol  Hypothyroid  HTN (hypertension)  Head and neck cancer: 2007  History of head and neck cancer      REVIEW OF SYSTEMS      General:Weak	    Skin/Breast:no rash  	  Ophthalmologic:denied visual changes  	  ENMT:	trouble swallowing    Respiratory and Thorax:pain with inspiration; cough productive of yellow sputum  	  Cardiovascular:	sternal chest pain    Gastrointestinal:	nol nausea or vomiting    Genitourinary:	no dysuria    Musculoskeletal:	no bone pain      	    Hematology/Lymphatics:	No bleeding            MEDICATIONS  (STANDING):  ALBUTerol/ipratropium for Nebulization 3 milliLiter(s) Nebulizer every 6 hours  amLODIPine   Tablet 5 milliGRAM(s) Oral daily  aspirin enteric coated 81 milliGRAM(s) Oral daily  enoxaparin Injectable 40 milliGRAM(s) SubCutaneous daily  levothyroxine 25 MICROGram(s) Oral daily  pantoprazole    Tablet 40 milliGRAM(s) Oral before breakfast  piperacillin/tazobactam IVPB. 3.375 Gram(s) IV Intermittent every 8 hours  simvastatin 10 milliGRAM(s) Oral at bedtime  sodium chloride 0.9%. 1000 milliLiter(s) (75 mL/Hr) IV Continuous <Continuous>    MEDICATIONS  (PRN):      Allergies    No Known Allergies    Intolerances        SOCIAL HISTORY:    Smoking Status:Former smoker   Alcohol:Denied  Marital Status:Single  Occupation:[employer] from [employer]    FAMILY HISTORY:  Family history of stomach cancer (Sibling)        Vital signs: 	36.9 (20 Dec 2018 17:17)  T(F): 98.4 (20 Dec 2018 17:17), Max: 98.4 (20 Dec 2018 17:17)  HR: 68 (20 Dec 2018 17:17) (60 - 87)  BP: 105/65 (20 Dec 2018 17:17) (99/52 - 108/61)  BP(mean): 73 (20 Dec 2018 07:45) (73 - 73)  RR: 18 (20 Dec 2018 17:17) (17 - 20)  SpO2: 99% (20 Dec 2018 17:17) (93% - 99%)    PHYSICAL EXAM:      Constitutional:Appears weak, chronically ill, poorly nourished, bitemporal wasting    Eyes:pale, anicteric    ENMT:post surgical; oropharynx dry mm's    Neck:RT changes            Respiratory:clear, occnsl rhonchi    Cardiovascular:RRR normal; S1S2    Gastrointestinal:schaphoid, soft, non-tender          Extremities:wasted musculature        Neurological:awake, alert, non-focal          LABS:                        8.4    12.3  )-----------( 331      ( 20 Dec 2018 00:25 )             27.5     12-20    139  |  98  |  19.0  ----------------------------<  93  4.2   |  30.0<H>  |  0.79    Ca    9.1      20 Dec 2018 00:25    TPro  7.4  /  Alb  3.2<L>  /  TBili  0.3<L>  /  DBili  x   /  AST  20  /  ALT  9   /  AlkPhos  58  12-20          RADIOLOGY & ADDITIONAL STUDIES:
HPI: This is a 74yo cachectic frail Tajik Speaking Male with PMH of Head and Neck Cancer, facial mass excision in . Recently readmitted, now comes to ED with two day history of chest pain, swallowing solids liquids and regurgitating.   Treating for Aspiration Pneumonia, is NPO and faced with decision to place feeding tube for nutritional support and medication administration. He has had a productive cough yellow mucous, and SOB.  He has not been able to eat, not had a bowel movement in a couple of days.  He denies falling, N/D. Living with friend. Last chemotherapy treatment  one month ago.       72 y/o Bengali speaking male, with PMH of head and neck cancer s/p left sided facial surgery for tumor resection (), Esophageal cancer with esophageal stricture s/p esophageal stent (with Dr. Herring) who received concurrent chemo/XRT with cisplatin/5 flurouracil with his last chemo therapy treatment being 1 month ago with Dr. Delacruz. Pt Presented to [facility] ED on  with 2 days pain in his chest with/ swallowing liquids and solids and regurgitation. CT scan for evaluation showed narrowing of esophagus superiorly to the stent as well as concern for progression of disease with possible lung metastasis. On 11/15 pt underwent EGD with GI, found 5mm in diameter stenosis of esophageal stent, without any obstruction or debris noted and per GI has been advanced to a pureed with honey thickened diet. Intact esophageal stent. Luminal narrowing.  No tumor ingrowth or overgrowth.  Needs to stay on a full liquid diet qith ensure supplements.  No solid food as lumen is too narrow to accommodate solid food. Pt was asked to f/u with GI  as outpatient. Also found to have CT chest consistent with new b/l Lung nodules with concern for metastasis and was recommended to f/u with CTS as outpatient for possible biopsy. Pt was discharged from [facility] at 18  Today pt returns to ED with similar complaints of SOB, productive cough & dysphagia that began today. Patient reports the cough is productive of yellowish sputum. He reports associated chest pain in his sternum when he coughs. Denies radiation of pain, aggravating/alleviating factors, associated numbness/tingling, diaphoresis, nausea/vomiting. Patient denies fevers/chills, headache, dizziness, light headedness, abdominal pain, nausea/vomiting, leg pain/swelling, hx of blood clots. CTA: No pulmonary embolus.Interval increase in size of numerous bilateral metastatic nodules, some of which are cavitary.  Superimposed tree-in-bud nodular airspace opacities, predominantly within the left upper lobe, which may represent superimposed infection. RVP +ve for rhinovirus. Pt failed dysphagia screen in the ED when he started choking & coughing on a small amount of thin liquids (20 Dec 2018 13:00)      PERTINENT PMH REVIEWED: Yes     PAST MEDICAL & SURGICAL HISTORY:  Malignant neoplasm of esophagus, unspecified location  High cholesterol  Hypothyroid  HTN (hypertension)  Head and neck cancer: 2007  History of head and neck cancer      SOCIAL HISTORY:  EtOH   No                                    Drugs    No                                     nonsmoker                                    Admitted from: home  HCP established today Brice Ashfordmelissa [phone]    FAMILY HISTORY:  Family history of stomach cancer (Sibling)    No Known Allergies    Intolerances    Baseline ADLs (prior to admission):  Independent/     Present Symptoms:     Dyspnea: 1- 2    Nausea/Vomiting: regurgitating  Anxiety:  Yes   Depression: Yes   Fatigue: Yes   Loss of appetite: Yes Has  not been able to eat    Pain: Musculoskeletal sternal pressure pain            Character-            Duration-            Effect-            Factors-            Frequency-            Location-            Severity-mild to moderate    Review of Systems: Reviewed                   Unable to obtain due to poor mentation       MEDICATIONS  (STANDING):  ALBUTerol/ipratropium for Nebulization 3 milliLiter(s) Nebulizer every 6 hours  amLODIPine   Tablet 5 milliGRAM(s) Oral daily  aspirin enteric coated 81 milliGRAM(s) Oral daily  enoxaparin Injectable 40 milliGRAM(s) SubCutaneous daily  lactated ringers. 1000 milliLiter(s) (75 mL/Hr) IV Continuous <Continuous>  levothyroxine 25 MICROGram(s) Oral daily  pantoprazole    Tablet 40 milliGRAM(s) Oral before breakfast  piperacillin/tazobactam IVPB. 3.375 Gram(s) IV Intermittent every 8 hours  simvastatin 10 milliGRAM(s) Oral at bedtime    MEDICATIONS  (PRN):    PHYSICAL EXAM:    Vital Signs Last 24 Hrs  T(C): 36.9 (21 Dec 2018 08:50), Max: 37 (20 Dec 2018 22:29)  T(F): 98.5 (21 Dec 2018 08:50), Max: 98.6 (20 Dec 2018 22:29)  HR: 54 (21 Dec 2018 09:18) (54 - 68)  BP: 102/63 (21 Dec 2018 08:50) (102/63 - 111/59)  BP(mean): --  RR: 18 (21 Dec 2018 08:50) (16 - 18)  SpO2: 100% (21 Dec 2018 09:18) (94% - 100%)    General: alert  oriented x __3__ lethargic                   cachexia      HEENT:  dry mouth      Lungs:  tachypnea/labored breathing     CV: bradycardia    GI:  distended               constipation  last BM: 3 days    : normal      MSK: weakness              ambulatory    Skin: normal   no rash    LABS:                        7.8    12.2  )-----------( 325      ( 21 Dec 2018 07:28 )             25.6     12-21    139  |  98  |  22.0<H>  ----------------------------<  101  4.1   |  29.0  |  0.87    Ca    8.9      21 Dec 2018 07:28  Phos  3.3     12-  Mg     2.0     -    TPro  7.4  /  Alb  3.2<L>  /  TBili  0.3<L>  /  DBili  x   /  AST  20  /  ALT  9   /  AlkPhos  58  12-20      Urinalysis Basic - ( 21 Dec 2018 03:50 )    Color: Yellow / Appearance: Clear / S.020 / pH: x  Gluc: x / Ketone: Negative  / Bili: Negative / Urobili: Negative mg/dL   Blood: x / Protein: 30 mg/dL / Nitrite: Negative   Leuk Esterase: Negative / RBC: 0-2 /HPF / WBC 0-2   Sq Epi: x / Non Sq Epi: x / Bacteria: Many    I&O's Summary    RADIOLOGY & ADDITIONAL STUDIES:  < from: Xray Chest 2 Views PA/Lat (18 @ 22:49) >    FINDINGS:  Multiple cavitary pulmonary nodules seen now within the RIGHT and LEFT   lung parenchyma consistent with metastasis . no pleural effusion or   pneumothorax . please see CT report subsequently obtained 2018 for   further clarification. Jsghyn-z-Acie catheter tip in SVC.  The airway is midline.  Mid esophageal stent in place.  There is no pleural effusion or pneumothorax.   The heart size is within the limits of normal.   The visualized osseus structures are intact.     IMPRESSION:    A numerous bilateral lung cavitating pulmonary nodules best described on   subsequent a 2018 4:05 AM a chest CT scan consistent with   metastatic disease..    < from: CT Angio Chest w/ IV Cont (18 @ 04:09) >  FINDINGS:    CHEST:     LUNGS AND LARGE AIRWAYS: Patent central airways. Interval increase in   size of numerous bilateral pulmonary nodules, some of which demonstrate   central cavitation. For example a left lower lobe nodule measures 1.8 cm   (series 8, image 81) previously 1.1 cm. A right lower lobe cavitary   lesion measures 1.9 cm (series 8, image 92) previously 1.1 cm.   Superimposed small nodular tree-in-bud-type airspace opacities   predominantly within the left upper lobe, may represent superimposed   infection.  PLEURA: No pleural effusion.  VESSELS: Adequate opacification of the pulmonary arteries. No pulmonary   embolus. Normal caliber thoracic aorta demonstrating atherosclerosis.  HEART: Heart size is normal. No pericardial effusion.  MEDIASTINUM AND LILLIE: Mid to distal esophageal stent with redemonstration   of focal thickening and narrowing of the esophageal lumen just proximal   and distal to the stent.  CHEST WALL AND LOWER NECK: Right anterior chest wall subcutaneous   MediPort.  VISUALIZED UPPER ABDOMEN: Within normal limits.  BONES: Mild multilevel degenerative changes of the spine.     IMPRESSION:     No pulmonary embolus.    Interval increase in size of numerous bilateral metastatic nodules, some   of which are cavitary.    Superimposed tree-in-bud nodular airspace opacities, predominantly within   the left upper lobe, which may represent superimposed infection.      ADVANCE DIRECTIVES:   DNRNO  Completed on:                     MOLST   NO   Completed on:  2018    Living Will  NO   Completed on:
The patient is a 73-year-old gentleman with metastatic esophageal carcinoma who has obstruction of the distal esophagus and is not able to eat due to persistent difficulty in swallowing and aspiration  The case has been discussed with Dr. Sahu who is the patient's hospitalist attending  The patient is noted to be alert oriented and appears to have capacity to make health care decisions.  At this time the patient is requesting attempts for feeding by means of a PEG because continued treatment with nasogastric tube or oral feedings are not possible.  Medical oncology has evaluated the patient and has considered palliative chemotherapy and they are willing to proceed with intervention. Chemotherapy will not be able to be offered if the patient does not have reliable nutrition.    Under these circumstances, the patient has capacity and has requested continued efforts for management of his difficulty in eating. A PEG will be effective to manage this issue at this time.\    Placement of a PEG is not being done to cure or manage the patient's oncologic problem. In fact, management of the patient's underlying disease is not currently "futile" although it is understood that the patient has a very guarded and poor prognosis.    It is ethically appropriate to offer  the PEG, even with a surgical approach, in this patient to specifically manage his difficulty in nutrition.  It would be unethical to withhold this request from this patient who has decision making capacity.
73 year old male admitted to medicine for aspiration pneumonia and inability to tolerate food and liquid. Patient was diagnosed with metastatic squamous esophageal cancer earlier this year. Patient was evaluated by GI who placed a stent in June. A month later patient was readmitted for dysphagia requiring esophageal dilation. Began undergoing chemotherapy via mediport with last treatment about 1 month ago. Patient was tolerating a liquid diet until two days prior to presentation. During this admission GI attempted to place PEG but was unable to pass the scope due to distal esophageal obstruction.     ROS: denies fevers, chills, nausea, chest pain, shortness of breath, dysuria, changes in bowel habits    PMH: h/o head/neck cancer s/p chemotherapy, HTN, hypothyroidism, HLD   PSH:  mediport placement, head/neck cancer resection with use of abdominal skin for flap closure  Medication: as per med rec  Allergies:  NKDA  FH: stomach cancer (sister)  SH: former smoker, quite >20 years ago, denies alcohol/illicit drug use    MEDICATIONS  (STANDING):  ALBUTerol/ipratropium for Nebulization 3 milliLiter(s) Nebulizer every 6 hours  amLODIPine   Tablet 5 milliGRAM(s) Oral daily  aspirin enteric coated 81 milliGRAM(s) Oral daily  enoxaparin Injectable 40 milliGRAM(s) SubCutaneous daily  lactated ringers. 1000 milliLiter(s) (75 mL/Hr) IV Continuous <Continuous>  levothyroxine 25 MICROGram(s) Oral daily  pantoprazole    Tablet 40 milliGRAM(s) Oral before breakfast  piperacillin/tazobactam IVPB. 3.375 Gram(s) IV Intermittent every 8 hours  simvastatin 10 milliGRAM(s) Oral at bedtime    MEDICATIONS  (PRN):      Vital Signs Last 24 Hrs  T(C): 37 (23 Dec 2018 15:37), Max: 37.1 (22 Dec 2018 23:55)  T(F): 98.6 (23 Dec 2018 15:37), Max: 98.7 (22 Dec 2018 23:55)  HR: 57 (23 Dec 2018 15:37) (55 - 76)  BP: 139/66 (23 Dec 2018 15:37) (121/65 - 139/66)  BP(mean): --  RR: 10 (23 Dec 2018 07:00) (10 - 10)  SpO2: 100% (23 Dec 2018 08:40) (98% - 100%)    Physical Exam:  General: no acute distress, cachectic, AOx3  HEENT: PERRLA, EOMI, no drainage or redness. left posterior auricular region was large surgical scar well healed  Neck: No bruits; no thyromegaly or nodules,  No JVD.  Respiratory: Breath Sounds equal & clear to auscultation, no accessory muscle use  Cardiovascular: Regular rate & rhythm, normal S1, S2; no murmurs, gallops or rubs  Gastrointestinal: Soft, non-tender, nondistended. large vertical scar just to the left of midline  Extremities: No peripheral edema, No cyanosis, clubbing   Vascular: Equal and normal pulses: 2+ peripheral pulses throughout        I&O's Detail    22 Dec 2018 07:01  -  23 Dec 2018 07:00  --------------------------------------------------------  IN:  Total IN: 0 mL    OUT:    Voided: 200 mL  Total OUT: 200 mL    Total NET: -200 mL      23 Dec 2018 07:01  -  23 Dec 2018 19:13  --------------------------------------------------------  IN:    lactated ringers.: 900 mL  Total IN: 900 mL    OUT:  Total OUT: 0 mL    Total NET: 900 mL          LABS:                        7.8    8.7   )-----------( 361      ( 23 Dec 2018 07:21 )             25.0     12-22    141  |  99  |  24.0<H>  ----------------------------<  72  4.6   |  27.0  |  0.89    Ca    9.1      22 Dec 2018 06:46  Phos  3.4     12-22  Mg     2.1     12-22            RADIOLOGY & ADDITIONAL STUDIES:  CTA: no pulmonary embolism.  new Interval increase in size of numerous bilateral metastatic nodules, some   of which are cavitary.    Mid to distal esophageal stent with redemonstration   of focal thickening and narrowing of the esophageal lumen just proximal   and distal to the stent.

## 2018-12-26 NOTE — PROGRESS NOTE ADULT - SUBJECTIVE AND OBJECTIVE BOX
Interventional Radiology Brief- Operative Note    Operators: Aly Ruiz MD    Procedure: Fluoroscopically guided percutaneous Gastrostomy tube placement    Post-op Dx: esophageal cancer    EBL: 10 mL    Medications: 1% lidocaine, sedation administered by anesthesia    Complications: no immediate complications    Findings/Plan: Successful percutaneous gastrostomy tube placement    Post-procedure Gastrostomy tube Short Term Management  •	Please connect the gastrostomy tube to gravity drainage for the first 24 hours prior to attempting feedings  •	If documented overnight output is not excessive and abdominal exam is benign, feedings may begin

## 2018-12-26 NOTE — PROGRESS NOTE ADULT - SUBJECTIVE AND OBJECTIVE BOX
CC: FOllow up for pna    INTERVAL HPI/OVERNIGHT EVENTS: Patient seen and examined, very frustrated this morning about delay in feeding tube placement.       Vital Signs Last 24 Hrs  T(C): 36.7 (26 Dec 2018 07:55), Max: 36.8 (25 Dec 2018 20:05)  T(F): 98 (26 Dec 2018 07:55), Max: 98.2 (25 Dec 2018 20:05)  HR: 54 (26 Dec 2018 07:55) (54 - 62)  BP: 125/63 (26 Dec 2018 07:55) (125/63 - 125/67)  BP(mean): --  RR: 18 (26 Dec 2018 07:55) (18 - 18)  SpO2: 99% (26 Dec 2018 07:55) (99% - 99%)    PHYSICAL EXAM:    GENERAL: NAD, AOX3  HEAD:  Atraumatic, Normocephalic  EYES: EOMI, PERRLA, conjunctiva and sclera clear  CHEST/LUNG: Clear to auscultation bilaterally; No rales, rhonchi, wheezing, or rubs  HEART: Regular rate and rhythm; No murmurs, rubs, or gallops  ABDOMEN: Soft, Nontender, Nondistended; Bowel sounds present  EXTREMITIES:  2+ Peripheral Pulses, No clubbing, cyanosis, or edema        MEDICATIONS  (STANDING):  lactated ringers. 1000 milliLiter(s) (75 mL/Hr) IV Continuous <Continuous>  levothyroxine Injectable 12.5 MICROGram(s) IV Push at bedtime  pantoprazole  Injectable 40 milliGRAM(s) IV Push daily  piperacillin/tazobactam IVPB. 3.375 Gram(s) IV Intermittent every 8 hours    MEDICATIONS  (PRN):  ALBUTerol/ipratropium for Nebulization 3 milliLiter(s) Nebulizer every 6 hours PRN Shortness of Breath and/or Wheezing      Allergies    No Known Allergies    Intolerances          LABS:                          8.6    10.3  )-----------( 380      ( 26 Dec 2018 07:06 )             27.9     12-26    139  |  96<L>  |  22.0<H>  ----------------------------<  75  4.1   |  28.0  |  0.60    Ca    9.5      26 Dec 2018 07:06      PT/INR - ( 26 Dec 2018 07:06 )   PT: 15.9 sec;   INR: 1.37 ratio               RADIOLOGY & ADDITIONAL TESTS:

## 2018-12-26 NOTE — CONSULT NOTE ADULT - CONSULT REASON
Dysphagia
Esophageal cancer
Placement of G tube
Welsh Speaking Male PMH of Esophageal Cancer, Stricture, Stenting     Dysphagia- Decision regarding Peg tube placement
placement of feeding tube

## 2018-12-26 NOTE — PROGRESS NOTE ADULT - ASSESSMENT
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  Percutaneous G tube placement requested for ongoing nutrition required for chemotherapy    - Recently performed EGD and CT scan demonstrated an obstructed distal esophagus. Will attempt placement of a fluoroscopically guided NG tube prior to percutaneous G tube placement. If an NG tube is unable to be placed for adequate insufflation prior to the  percutaneous G tube placement, a discussion will need to be held with oncology, medicine, and surgery for further planning.

## 2018-12-26 NOTE — PROGRESS NOTE ADULT - SUBJECTIVE AND OBJECTIVE BOX
GASTROSTOMY/GASTROJEJUNOSTOMY TUBE INSERTION    Referring physician: Luis Alberto  Requested Procedure: Percutaneous Gastrostomy  Indication:  dysphagia head and neck malignancy     Relevant Clinical information: 73 year old male with a pmhx of head and neck cancer s/p left sided facial surgery in 2007, esophageal stenting for stricture, admitted for chest pain and dysphagia in November. EGD was done and demonstrated stent stenosis and occlusion of the esophageal lumen below the stent. An NG tube or scope was unable to be passed the obstructed esophagus.   Laboratory results:   a.	Platelets (>80):  b.	INR (<1.3):  c.	Cr:    Available imaging studies: (check anatomical position of stomach)  Allergies:  Anticoaulagion:  Antibiotics:    Patient location:  Consent:  Ventilator and NPO status: GASTROSTOMY/GASTROJEJUNOSTOMY TUBE INSERTION    Referring physician: Luis Alberto  Requested Procedure: Percutaneous Gastrostomy  Indication:  dysphagia head and neck malignancy     Relevant Clinical information: 73 year old male with a pmhx of head and neck cancer s/p left sided facial surgery in 2007, esophageal stenting for stricture, admitted for chest pain and dysphagia in November. EGD was done and demonstrated stent stenosis and occlusion of the esophageal lumen below the stent. An NG tube or scope was unable to be passed the obstructed esophagus.   Laboratory results:   a.	Platelets: 380  b.	INR: 1.37  c.	Cr: 0.60    Available imaging studies: CT 12/20/2018 - demonstrated numerous bilateral metastatic pulmonary nodules and an occlusive mass at the GE junction.   Allergies:NKA  Anticoagulation: currently not on any anticoagulation, last dose of 40 mg subq enoxaparin given on 12/23.  Antibiotics: currently on piperacillin/tazobactam 3.375 mg IV last dose given at 5:47 am today

## 2018-12-27 PROCEDURE — 99232 SBSQ HOSP IP/OBS MODERATE 35: CPT

## 2018-12-27 RX ORDER — AMLODIPINE BESYLATE 2.5 MG/1
5 TABLET ORAL DAILY
Qty: 0 | Refills: 0 | Status: DISCONTINUED | OUTPATIENT
Start: 2018-12-27 | End: 2018-12-28

## 2018-12-27 RX ORDER — ASPIRIN/CALCIUM CARB/MAGNESIUM 324 MG
81 TABLET ORAL DAILY
Qty: 0 | Refills: 0 | Status: DISCONTINUED | OUTPATIENT
Start: 2018-12-27 | End: 2019-01-01

## 2018-12-27 RX ORDER — LEVOTHYROXINE SODIUM 125 MCG
25 TABLET ORAL DAILY
Qty: 0 | Refills: 0 | Status: DISCONTINUED | OUTPATIENT
Start: 2018-12-27 | End: 2019-01-01

## 2018-12-27 RX ORDER — PANTOPRAZOLE SODIUM 20 MG/1
40 TABLET, DELAYED RELEASE ORAL
Qty: 0 | Refills: 0 | Status: DISCONTINUED | OUTPATIENT
Start: 2018-12-27 | End: 2018-12-31

## 2018-12-27 RX ORDER — ACETAMINOPHEN 500 MG
650 TABLET ORAL EVERY 6 HOURS
Qty: 0 | Refills: 0 | Status: DISCONTINUED | OUTPATIENT
Start: 2018-12-27 | End: 2018-12-28

## 2018-12-27 RX ORDER — SIMVASTATIN 20 MG/1
10 TABLET, FILM COATED ORAL AT BEDTIME
Qty: 0 | Refills: 0 | Status: DISCONTINUED | OUTPATIENT
Start: 2018-12-27 | End: 2019-01-01

## 2018-12-27 RX ADMIN — SIMVASTATIN 10 MILLIGRAM(S): 20 TABLET, FILM COATED ORAL at 21:43

## 2018-12-27 RX ADMIN — ENOXAPARIN SODIUM 40 MILLIGRAM(S): 100 INJECTION SUBCUTANEOUS at 12:50

## 2018-12-27 RX ADMIN — Medication 650 MILLIGRAM(S): at 12:49

## 2018-12-27 RX ADMIN — Medication 81 MILLIGRAM(S): at 12:49

## 2018-12-27 RX ADMIN — PIPERACILLIN AND TAZOBACTAM 25 GRAM(S): 4; .5 INJECTION, POWDER, LYOPHILIZED, FOR SOLUTION INTRAVENOUS at 06:10

## 2018-12-27 NOTE — DISCHARGE NOTE ADULT - PATIENT PORTAL LINK FT
You can access the [facility] Patient Portal, offered by [facility], by registering with the following website: [email or web address]

## 2018-12-27 NOTE — PROGRESS NOTE ADULT - SUBJECTIVE AND OBJECTIVE BOX
Araceli/Onc. FU.    Patient is a 73y Male seen in a FU of esophageal cancer.     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  He has received treatment  with concurrent  CDDP/5-FU and RT recently.  A FU PET scan showed pulmonary lesions, he was sent to Dr. Abebe for a Bx but it was thought he had metastatic esophageal cancer and a bx was not warranted.    Had admission to evaluate for chest pain, difficulty swallowing.  EGD showed narrowing.  Recommendations included diet of full liquids as could not tolerate solids but is unable to swallow due to esophageal narrowing.  Pulmonary nodules noted on CT chest.  No biopsy recommended for lung lesion last admission.  Returns to ER c/o SOB, cough productive of yellow sputum,. pain  in sternum with coughing.  Difficulty swallowing.  Noted to have progressive lung nodules. Possible superimposed infection.  Denies nausea or vomiting, fevers, chills, sweats or night sweats, headaches or dizziness.   Unable to eat only for a few days PTA.      He has been NPO, underwent EGD  which demonstrated complete occlusion.   Had J-tube place 12/26; receiving feedings without complication   wants to be treated for his cancer.    PAST MEDICAL & SURGICAL HISTORY:  Malignant neoplasm of esophagus, unspecified location  High cholesterol  Hypothyroid  HTN (hypertension)  Head and neck cancer: 2007  History of head and neck cancer    MEDICATIONS  (STANDING):  ALBUTerol/ipratropium for Nebulization 3 milliLiter(s) Nebulizer every 6 hours  amLODIPine   Tablet 5 milliGRAM(s) Oral daily  aspirin enteric coated 81 milliGRAM(s) Oral daily  enoxaparin Injectable 40 milliGRAM(s) SubCutaneous daily  lactated ringers. 1000 milliLiter(s) (75 mL/Hr) IV Continuous <Continuous>  levothyroxine 25 MICROGram(s) Oral daily  pantoprazole    Tablet 40 milliGRAM(s) Oral before breakfast  piperacillin/tazobactam IVPB. 3.375 Gram(s) IV Intermittent every 8 hours  simvastatin 10 milliGRAM(s) Oral at bedtime    Vital Signs Last 24 Hrs  T(C): 36.7 (26 Dec 2018 07:55), Max: 36.8 (25 Dec 2018 20:05)  T(F): 98 (26 Dec 2018 07:55), Max: 98.2 (25 Dec 2018 20:05)  HR: 54 (26 Dec 2018 07:55) (54 - 62)  BP: 125/63 (26 Dec 2018 07:55) (125/63 - 125/67)  BP(mean): --  RR: 18 (26 Dec 2018 07:55) (18 - 18)  SpO2: 99% (26 Dec 2018 07:55) (99% - 99%)    P/E: Alert and oriented; appears comfortable  Pale  No icterus  No cervical LNs  Lungs: Diminished BS but clear  Heart: RR  Abd: Non-tender  No palpable HSM  Exts: Cachectic.    CBC Full  -  ( 26 Dec 2018 07:06 )  WBC Count : 10.3 K/uL  Hemoglobin : 8.6 g/dL  Hematocrit : 27.9 %  Platelet Count - Automated : 380 K/uL  Mean Cell Volume : 95.2 fl  Mean Cell Hemoglobin : 29.4 pg  Mean Cell Hemoglobin Concentration : 30.8 g/dL  Auto Neutrophil # : x  Auto Lymphocyte # : x  Auto Monocyte # : x  Auto Eosinophil # : x  Auto Basophil # : x  Auto Neutrophil % : x  Auto Lymphocyte % : x  Auto Monocyte % : x  Auto Eosinophil % : x  Auto Basophil % : x    26 Dec 2018 07:06    139    |  96     |  22.0   ----------------------------<  75     4.1     |  28.0   |  0.60     Ca    9.5        26 Dec 2018 07:06        Assessment: Metastatic esophageal cancer  Had concurrent chemo/RT with CDDP and CI 5 FU  Now  has pulmonary mets and esophageal stricture  Unable to eat.  Is alert and oriented  and not  confused or unconscious.  Had J tube placed without complication  Spoke with patient; he wants to pursue additional treatment   Chemo following the beginning of nutrition as outpatient.            Thank you.

## 2018-12-27 NOTE — PROGRESS NOTE ADULT - SUBJECTIVE AND OBJECTIVE BOX
CC: Follow up post G tube placement    INTERVAL HPI/OVERNIGHT EVENTS: Patient seen and examined, G tube placed by IR yesterday. Minimal output. Some discomfort at tube site.       Vital Signs Last 24 Hrs  T(C): 36.4 (27 Dec 2018 09:16), Max: 37.6 (26 Dec 2018 23:30)  T(F): 97.6 (27 Dec 2018 09:16), Max: 99.7 (26 Dec 2018 23:30)  HR: 54 (27 Dec 2018 09:16) (51 - 54)  BP: 129/74 (27 Dec 2018 09:16) (125/65 - 171/80)  BP(mean): --  RR: 16 (27 Dec 2018 09:16) (16 - 18)  SpO2: 100% (27 Dec 2018 09:16) (99% - 100%)    PHYSICAL EXAM:    GENERAL: NAD,AOX3  HEAD:  Atraumatic, Normocephalic  EYES: EOMI, PERRLA, conjunctiva and sclera clear  ENMT: Moist mucous membranessymmetric  CHEST/LUNG: Clear to auscultation bilaterally; No rales, rhonchi, wheezing, or rubs  HEART: Regular rate and rhythm; No murmurs, rubs, or gallops  ABDOMEN: Soft, Nontender, Nondistended; Bowel sounds present + G tube   EXTREMITIES:  2+ Peripheral Pulses, No clubbing, cyanosis, or edema        MEDICATIONS  (STANDING):  amLODIPine   Tablet 5 milliGRAM(s) Oral daily  aspirin enteric coated 81 milliGRAM(s) Oral daily  enoxaparin Injectable 40 milliGRAM(s) SubCutaneous daily  levothyroxine 25 MICROGram(s) Oral daily  pantoprazole    Tablet 40 milliGRAM(s) Oral before breakfast  simvastatin 10 milliGRAM(s) Oral at bedtime    MEDICATIONS  (PRN):  ALBUTerol/ipratropium for Nebulization 3 milliLiter(s) Nebulizer every 6 hours PRN Shortness of Breath and/or Wheezing      Allergies    No Known Allergies    Intolerances          LABS:                          8.6    10.3  )-----------( 380      ( 26 Dec 2018 07:06 )             27.9     12-26    139  |  96<L>  |  22.0<H>  ----------------------------<  75  4.1   |  28.0  |  0.60    Ca    9.5      26 Dec 2018 07:06      PT/INR - ( 26 Dec 2018 07:06 )   PT: 15.9 sec;   INR: 1.37 ratio               RADIOLOGY & ADDITIONAL TESTS:

## 2018-12-27 NOTE — PROGRESS NOTE ADULT - ASSESSMENT
72 y/o Korean speaking male, with PMH of head and neck cancer s/p left sided facial surgery for tumor resection (2007), Esophageal cancer with esophageal stricture s/p esophageal stent (with Dr. Herring) who received concurrent chemo/XRT with cisplatin/5 flurouracil with his last chemo therapy treatment being 1 month ago with Dr. Delacruz. He was admitted in November for chest pain and dysphagia. EGD showed esophageal stent stenosis. He was advised a full liquid diet. CT chest was consistent with bilateral nodules concerning for mets. He returned to the Er for sob, dysphagia and cough.   Positive for RVP rhinovirus. He failed initial dysphagia screen. Completed a total of 7 days of IV zosyn for aspiration pneumonia. Was seen by GI, recommend alterative means of nutrition. G tube placed by IR and tube feeds started.     Assessment/Plan:    1. SOB secondary to + Rhinovirus and worsening lung mets.   Supportive care  IVF  Duonebs  O2 prn  Completed 7 days of IV zosyn for Gram negative and positive aspiration pneumonia - Sputum culture positive for Klebsiella and staph   Blood and urine cultures negative       2. Worsening Dysphagia likely from Esophageal stricture from progression of esophageal cancer: Status post G tube placement by IR   Start tube feeds  Nutrition consult     3. Malignant neoplasm of esophagus with likely mets to lung  Per oncology will be a candidate for further chemotherapy once means of nutrition established given good response in the past.     4, Hypothyroidism: PO synthroid    5.Hypertension: Stable. Resume aspirin and norvasc    6. Severe protein calorie malnutrition: Nutrition consult for tube feed recommendations      DVT Lovenox subcut  GI PPI PO     PT evaluation The patient is a 73 year old male with a past medical history of head and neck cancer s/p left sided facial surgery for tumor resection (2007), Esophageal cancer with esophageal stricture s/p esophageal stent (with Dr. Herring) who received concurrent chemo/XRT with cisplatin/5 flurouracil with his last chemo therapy treatment being 1 month ago with Dr. Delacruz. He was admitted in November for chest pain and dysphagia. EGD showed esophageal stent stenosis. He was advised a full liquid diet. CT chest was consistent with bilateral nodules concerning for mets. He returned to the Er for sob, dysphagia and cough. Positive for RVP rhinovirus. He failed initial dysphagia screen. Completed a total of 7 days of IV zosyn for aspiration pneumonia. Was seen by GI, recommend alterative means of nutrition. G tube placed by IR and tube feeds started.     Assessment/Plan:    1. SOB secondary to + Rhinovirus and worsening lung mets: Improved   Completed 7 days of IV zosyn for Gram negative and positive aspiration pneumonia - Sputum culture positive for Klebsiella and staph   Blood and urine cultures negative       2. Worsening Dysphagia likely from Esophageal stricture from progression of esophageal cancer: Status post G tube placement by IR   Start tube feeds  Nutrition consult     3. Malignant neoplasm of esophagus with likely mets to lung  Per oncology will be a candidate for further chemotherapy once means of nutrition established given good response in the past.     4, Hypothyroidism: PO synthroid    5.Hypertension: Stable. Resume aspirin and norvasc    6. Severe protein calorie malnutrition: Nutrition consult for tube feed recommendations      DVT Lovenox subcut  GI PPI PO     PT evaluation

## 2018-12-27 NOTE — GOALS OF CARE CONVERSATION - PERSONAL ADVANCE DIRECTIVE - TREATMENT GUIDELINE COMMENT
Feeding tube placement  Artificial Nutrition if feeding tube successful.          DNR/DNI    Will speak to Dr. Nickerson about any future chemotherapy treatment
Pt is alert and oriented and wishes to have chemo and radiation.

## 2018-12-27 NOTE — DISCHARGE NOTE ADULT - CARE PROVIDER_API CALL
Herlinda Delacruz), Hematology; Medical Oncology  [address] [facility]  [address] [place] [address]  Phone: [phone]  Fax: [phone]

## 2018-12-27 NOTE — GOALS OF CARE CONVERSATION - PERSONAL ADVANCE DIRECTIVE - CONVERSATION/DISCUSSION
Hospice Referral
Palliative Care Referral
Diagnosis/[facility] Discussed/Hospice Referral/Prognosis/Treatment Options

## 2018-12-27 NOTE — DISCHARGE NOTE ADULT - SECONDARY DIAGNOSIS.
Malignant neoplasm of esophagus, unspecified location Severe protein-calorie malnutrition Aspiration pneumonia, unspecified aspiration pneumonia type, unspecified laterality, unspecified part of lung

## 2018-12-27 NOTE — DISCHARGE NOTE ADULT - HOSPITAL COURSE
The patient is a 73 year old male with a past medical history of head and neck cancer s/p left sided facial surgery for tumor resection (2007), Esophageal cancer with esophageal stricture s/p esophageal stent (with Dr. Herring) who received concurrent chemo/XRT with cisplatin/5 flurouracil with his last chemo therapy treatment being 1 month ago with Dr. Delacruz. He was admitted in November for chest pain and dysphagia. EGD showed esophageal stent stenosis. He was advised a full liquid diet. CT chest was consistent with bilateral nodules concerning for mets. He returned to the Er for sob, dysphagia and cough. Positive for RVP rhinovirus. He failed initial dysphagia screen. Completed a total of 7 days of IV zosyn for aspiration pneumonia. Was seen by GI, recommend alterative means of nutrition. G tube placed by IR and tube feeds started.

## 2018-12-27 NOTE — GOALS OF CARE CONVERSATION - PERSONAL ADVANCE DIRECTIVE - CONVERSATION DETAILS
Using a Hospital ,  I  spoke at length to Patient about his disease progression, that he needs to remain   NPO, because he is aspirating thin liquids at this time.   We discussed whether he would want to have a feeding tube placed for nutrition and medication administration.  He agrees he wants a feeding tube, GI to consult IR see if a feeding tube can be successfully placed for his needs.    He was recently treated last month with chemotherapy with Dr. Nickerson who will visit Patient here, and  patient on future treatment options.    hOSPICE lIASON Jim ALSO AT MEETING, SHE CONFIRMS HOSPICE WILL SUPPORT PATIENT AT HOME,  with feeding tube once Chemotherapy treatment has been completed.    We designated friend Brice as his HCP, and completed [facility] Directive at this time.    Patient wants to have a natural death, otherwise continue to treat acutely
HCN liaison met with pt and , explained home hospice program with return understanding. POC is pending peg tube placement and disease directed therapies.
Mtg with Pt to review Plan of Care.  Pt complained of abd pain NP notified and will order pain  medication.  In reviewing chart Pt is alert and oriented and has agreed to having PEG placed for nutrition purposes.  Pt wants all treatments eg chemo and radiation as per oncology note.  Palliative will follow as needed.

## 2018-12-27 NOTE — DISCHARGE NOTE ADULT - CARE PLAN
Principal Discharge DX:	Dysphagia, unspecified type  Secondary Diagnosis:	Malignant neoplasm of esophagus, unspecified location  Secondary Diagnosis:	Severe protein-calorie malnutrition  Secondary Diagnosis:	Aspiration pneumonia, unspecified aspiration pneumonia type, unspecified laterality, unspecified part of lung

## 2018-12-28 DIAGNOSIS — Z51.5 ENCOUNTER FOR PALLIATIVE CARE: ICD-10-CM

## 2018-12-28 LAB
ANION GAP SERPL CALC-SCNC: 14 MMOL/L — SIGNIFICANT CHANGE UP (ref 5–17)
BUN SERPL-MCNC: 17 MG/DL — SIGNIFICANT CHANGE UP (ref 8–20)
CALCIUM SERPL-MCNC: 8.9 MG/DL — SIGNIFICANT CHANGE UP (ref 8.6–10.2)
CHLORIDE SERPL-SCNC: 95 MMOL/L — LOW (ref 98–107)
CO2 SERPL-SCNC: 27 MMOL/L — SIGNIFICANT CHANGE UP (ref 22–29)
CREAT SERPL-MCNC: 0.49 MG/DL — LOW (ref 0.5–1.3)
GLUCOSE SERPL-MCNC: 166 MG/DL — HIGH (ref 70–115)
HCT VFR BLD CALC: 28.4 % — LOW (ref 42–52)
HGB BLD-MCNC: 8.8 G/DL — LOW (ref 14–18)
MCHC RBC-ENTMCNC: 29.2 PG — SIGNIFICANT CHANGE UP (ref 27–31)
MCHC RBC-ENTMCNC: 31 G/DL — LOW (ref 32–36)
MCV RBC AUTO: 94.4 FL — HIGH (ref 80–94)
PLATELET # BLD AUTO: 389 K/UL — SIGNIFICANT CHANGE UP (ref 150–400)
POTASSIUM SERPL-MCNC: 4.3 MMOL/L — SIGNIFICANT CHANGE UP (ref 3.5–5.3)
POTASSIUM SERPL-SCNC: 4.3 MMOL/L — SIGNIFICANT CHANGE UP (ref 3.5–5.3)
RBC # BLD: 3.01 M/UL — LOW (ref 4.6–6.2)
RBC # FLD: 15.3 % — SIGNIFICANT CHANGE UP (ref 11–15.6)
SODIUM SERPL-SCNC: 136 MMOL/L — SIGNIFICANT CHANGE UP (ref 135–145)
WBC # BLD: 14.8 K/UL — HIGH (ref 4.8–10.8)
WBC # FLD AUTO: 14.8 K/UL — HIGH (ref 4.8–10.8)

## 2018-12-28 PROCEDURE — 99232 SBSQ HOSP IP/OBS MODERATE 35: CPT

## 2018-12-28 RX ORDER — ACETAMINOPHEN 500 MG
650 TABLET ORAL EVERY 6 HOURS
Qty: 0 | Refills: 0 | Status: COMPLETED | OUTPATIENT
Start: 2018-12-28 | End: 2018-12-29

## 2018-12-28 RX ORDER — METOCLOPRAMIDE HCL 10 MG
10 TABLET ORAL EVERY 6 HOURS
Qty: 0 | Refills: 0 | Status: DISCONTINUED | OUTPATIENT
Start: 2018-12-28 | End: 2019-01-01

## 2018-12-28 RX ORDER — METOCLOPRAMIDE HCL 10 MG
10 TABLET ORAL EVERY 6 HOURS
Qty: 0 | Refills: 0 | Status: DISCONTINUED | OUTPATIENT
Start: 2018-12-28 | End: 2018-12-28

## 2018-12-28 RX ADMIN — Medication 650 MILLIGRAM(S): at 14:53

## 2018-12-28 RX ADMIN — Medication 25 MICROGRAM(S): at 06:00

## 2018-12-28 RX ADMIN — Medication 650 MILLIGRAM(S): at 21:35

## 2018-12-28 RX ADMIN — Medication 650 MILLIGRAM(S): at 04:25

## 2018-12-28 RX ADMIN — Medication 650 MILLIGRAM(S): at 22:34

## 2018-12-28 RX ADMIN — SIMVASTATIN 10 MILLIGRAM(S): 20 TABLET, FILM COATED ORAL at 21:35

## 2018-12-28 RX ADMIN — Medication 650 MILLIGRAM(S): at 14:11

## 2018-12-28 RX ADMIN — ENOXAPARIN SODIUM 40 MILLIGRAM(S): 100 INJECTION SUBCUTANEOUS at 14:27

## 2018-12-28 RX ADMIN — PANTOPRAZOLE SODIUM 40 MILLIGRAM(S): 20 TABLET, DELAYED RELEASE ORAL at 06:00

## 2018-12-28 RX ADMIN — Medication 81 MILLIGRAM(S): at 14:12

## 2018-12-28 NOTE — PHYSICAL THERAPY INITIAL EVALUATION ADULT - PERTINENT HX OF CURRENT PROBLEM, REHAB EVAL
72 y/o male PMHx of head and neck cancer s/p left facial tumor resection (2007), Esophageal cancer w esophageal stricture s/p esophageal stent w/concurrent chemo/XRT, last chemo therapy tx 1 month ago with Dr. Delacruz. Admitted in 11/18 for chest pain and dysphagia. EGD showed esophageal stent stenosis. CT chest (+) bilateral nodules concerning for mets. Returns to ED c/o sob, dysphagia and cough. Positive for RVP rhinovirus.

## 2018-12-28 NOTE — PROGRESS NOTE ADULT - ASSESSMENT
< from: CT Angio Chest w/ IV Cont (12.20.18 @ 04:09) >     EXAM:  CT ANGIO CHEST (W)AW IC                          PROCEDURE DATE:  12/20/2018          INTERPRETATION:  CLINICAL INFORMATION: Shortness of breath. Rule out   pulmonary embolus. History of esophageal cancer.    COMPARISON: CT chest of 11/12/2018.    PROCEDURE:   CTA of the Chest was performed with intravenous contrast.  65 ml of Omnipaque 350 was injected intravenously. 35 ml were discarded.  Sagittal and coronal reformats were performed as well as MIP   Reconstructions.      FINDINGS:    CHEST:     LUNGS AND LARGE AIRWAYS: Patent central airways. Interval increase in   size of numerous bilateral pulmonary nodules, some of which demonstrate   central cavitation. For example a left lower lobe nodule measures 1.8 cm   (series 8, image 81) previously 1.1 cm. A right lower lobe cavitary   lesion measures 1.9 cm (series 8, image 92) previously 1.1 cm.   Superimposed small nodular tree-in-bud-type airspace opacities   predominantly within the left upper lobe, may represent superimposed   infection.  PLEURA: No pleural effusion.  VESSELS: Adequate opacification of the pulmonary arteries. No pulmonary   embolus. Normal caliber thoracic aorta demonstrating atherosclerosis.  HEART: Heart size is normal. No pericardial effusion.  MEDIASTINUM AND LILLIE: Mid to distal esophageal stent with redemonstration   of focal thickening and narrowing of the esophageal lumen just proximal   and distal to the stent.  CHEST WALL AND LOWER NECK: Right anterior chest wall subcutaneous   MediPort.  VISUALIZED UPPER ABDOMEN: Within normal limits.  BONES: Mild multilevel degenerative changes of the spine.     IMPRESSION:     No pulmonary embolus.    Interval increase in size of numerous bilateral metastatic nodules, some   of which are cavitary.    Superimposed tree-in-bud nodular airspace opacities, predominantly within   the left upper lobe, which may represent superimposed infection.    < end of copied text >    Will try chemo a an outpatient.    Thank you Dr. Sahu for your help.

## 2018-12-28 NOTE — PROGRESS NOTE ADULT - ASSESSMENT
The patient is a 73 year old male with a past medical history of head and neck cancer s/p left sided facial surgery for tumor resection (2007), Esophageal cancer with esophageal stricture s/p esophageal stent (with Dr. Herring) who received concurrent chemo/XRT with cisplatin/5 flurouracil with his last chemo therapy treatment being 1 month ago with Dr. Delacruz. He was admitted in November for chest pain and dysphagia. EGD showed esophageal stent stenosis. He was advised a full liquid diet. CT chest was consistent with bilateral nodules concerning for mets. He returned to the Er for sob, dysphagia and cough. Positive for RVP rhinovirus. He failed initial dysphagia screen. Completed a total of 7 days of IV zosyn for aspiration pneumonia. Was seen by GI, recommend alterative means of nutrition. G tube placed by IR and tube feeds started.     Assessment/Plan:    1. SOB secondary to + Rhinovirus and worsening lung mets: Improved   Completed 7 days of IV zosyn for Gram negative and positive aspiration pneumonia - Sputum culture positive for Klebsiella and staph   Blood and urine cultures negative       2. Worsening Dysphagia likely from Esophageal stricture from progression of esophageal cancer: Status post G tube placement by IR   Tolerating continuous  tube feeds. Awaiting nutrition recommendations regarding bolus feeds to initiate prior to discharge      3. Malignant neoplasm of esophagus with likely mets to lung  To follow up with oncology as outpatient for further treatment     4, Hypothyroidism: PO synthroid    5.Hypertension: Norvasc held after hypotensive this morning >BP improved without intervention. Continue to monitor     6. Severe protein calorie malnutrition: Nutrition consult for tube feed recommendations    7. Leukocytosis: Afebrile at this time. Continue to monitor cbc and for fever.     PT evaluated- patient refused yesterday, to continue to follow    Discharge planning: Spoke with case management Irma- discharge plan will be for home. Awaiting bolus feed recommendations. If he tolerates bolus feeds, CM will need prescription to arrange for home. Patient's preference if for discharge home and resumption of chemotherapy.     DNR/DNi   DVT Lovenox subcut  GI PPI PO The patient is a 73 year old male with a past medical history of head and neck cancer s/p left sided facial surgery for tumor resection (2007), Esophageal cancer with esophageal stricture s/p esophageal stent (with Dr. Herring) who received concurrent chemo/XRT with cisplatin/5 flurouracil with his last chemo therapy treatment being 1 month ago with Dr. Delacruz. He was admitted in November for chest pain and dysphagia. EGD showed esophageal stent stenosis. He was advised a full liquid diet. CT chest was consistent with bilateral nodules concerning for mets. He returned to the Er for sob, dysphagia and cough. Positive for RVP rhinovirus. He failed initial dysphagia screen. Completed a total of 7 days of IV zosyn for aspiration pneumonia. Was seen by GI, recommend alterative means of nutrition. G tube placed by IR and tube feeds started.     Assessment/Plan:    1. SOB secondary to + Rhinovirus and worsening lung mets: Improved   Completed 7 days of IV zosyn for Gram negative and positive aspiration pneumonia - Sputum culture positive for Klebsiella and staph   Blood and urine cultures negative       2. Worsening Dysphagia likely from Esophageal stricture from progression of esophageal cancer: Status post G tube placement by IR   Tolerating continuous  tube feeds. Nutrition consulted for recommendations    3. Malignant neoplasm of esophagus with likely mets to lung  To follow up with oncology as outpatient for further treatment     4, Hypothyroidism: PO synthroid    5.Hypertension: Norvasc held after hypotensive this morning >BP improved without intervention. Continue to monitor     6. Severe protein calorie malnutrition: Nutrition consult for tube feed recommendations    7. Leukocytosis: Afebrile at this time. Continue to monitor cbc and for fever.     PT evaluated- patient refused yesterday, to continue to follow    Discharge planning: Spoke with case management Irma- discharge plan will be for home. If patient tolerates continuos tube feeds at goal, will need nutrition recommendations regarding bolus feeds on discharge.  If he tolerates bolus feeds, CM will need prescription to arrange for home. Patient's preference if for discharge home and resumption of chemotherapy.     DNR/DNi   DVT Lovenox subcut  GI PPI PO

## 2018-12-28 NOTE — PROGRESS NOTE ADULT - SUBJECTIVE AND OBJECTIVE BOX
Heme/Onc. FU:    The patient is a 73 year old male with a past medical history of head and neck cancer s/p left sided facial surgery for tumor resection (2007), Esophageal cancer with esophageal stricture s/p esophageal stent (with Dr. Herring) who received concurrent chemo/XRT with cisplatin/5 flurouracil with his last chemo therapy treatment about 2 months ago.  . He was admitted in November for chest pain and dysphagia. EGD showed esophageal stent stenosis. He was advised a full liquid diet. CT chest was consistent with bilateral nodules concerning for mets. He returned to the Er for sob, dysphagia and cough. Positive for RVP rhinovirus. He failed initial dysphagia screen. Completed a total of 7 days of IV zosyn for aspiration pneumonia. Was seen by GI, recommend alterative means of nutrition. G tube placed by IR and tube feeds started.   P/E:  Alert and oriented  No trouble with the feeding  Mild pallor  No jaundice  No cervical LNs  Lungs: Clear  but decreased BS  Heart: RR   Abd: non-tender  No HSM  Exts: Cachectic.    CBC Full  -  ( 28 Dec 2018 05:39 )  WBC Count : 14.8 K/uL  Hemoglobin : 8.8 g/dL  Hematocrit : 28.4 %  Platelet Count - Automated : 389 K/uL  Mean Cell Volume : 94.4 fl  Mean Cell Hemoglobin : 29.2 pg  Mean Cell Hemoglobin Concentration : 31.0 g/dL  Auto Neutrophil # : x  Auto Lymphocyte # : x  Auto Monocyte # : x  Auto Eosinophil # : x  Auto Basophil # : x  Auto Neutrophil % : x  Auto Lymphocyte % : x  Auto Monocyte % : x  Auto Eosinophil % : x  Auto Basophil % : x    28 Dec 2018 05:39    136    |  95     |  17.0   ----------------------------<  166    4.3     |  27.0   |  0.49     Ca    8.9        28 Dec 2018 05:39

## 2018-12-28 NOTE — PROGRESS NOTE ADULT - SUBJECTIVE AND OBJECTIVE BOX
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  He has received treatment  with concurrent  CDDP/5-FU and RT recently.  A FU PET scan showed pulmonary lesions, he was sent to Dr. Abebe for a Bx but it was thought he had metastatic esophageal cancer and a bx was not warranted.      INTERVAL HPI/OVERNIGHT EVENTS:   Patient is Post-op Day 2 for J tube placement. He remains NPO. He is alert and oriented, calm cooperative. J tube site C/D/I, area is sore as per patients' admission.  Tolerating tube feeding through pump, he denies N/V/D CP Palpitations    73y old  Male who presents with a chief complaint of SOB (27 Dec 2018 19:42)    PAST MEDICAL & SURGICAL HISTORY:  Malignant neoplasm of esophagus, unspecified location  High cholesterol  Hypothyroid  HTN (hypertension)  Head and neck cancer: 2007  History of head and neck cancer      Present Symptoms:   Dyspnea: 0    Nausea/Vomiting:  No  Anxiety:   No  Depression:  No  Fatigue: Yes   Loss of appetite: N/A NPO-aspirating    Pain: mild soreness            Character-            Duration-            Effect-            Factors-            Frequency-            Location-J tube site upper mid abdomen            Severity-    Review of Systems: Reviewed                     All others negative    MEDICATIONS  (STANDING):  acetaminophen    Suspension .. 650 milliGRAM(s) Oral every 6 hours  aspirin enteric coated 81 milliGRAM(s) Oral daily  enoxaparin Injectable 40 milliGRAM(s) SubCutaneous daily  levothyroxine 25 MICROGram(s) Oral daily  metoclopramide 10 milliGRAM(s) Oral every 6 hours  pantoprazole    Tablet 40 milliGRAM(s) Oral before breakfast  simvastatin 10 milliGRAM(s) Oral at bedtime    MEDICATIONS  (PRN):  ALBUTerol/ipratropium for Nebulization 3 milliLiter(s) Nebulizer every 6 hours PRN Shortness of Breath and/or Wheezing    PHYSICAL EXAM:    Vital Signs Last 24 Hrs  T(C): 36.5 (28 Dec 2018 08:43), Max: 36.9 (27 Dec 2018 23:38)  T(F): 97.7 (28 Dec 2018 08:43), Max: 98.4 (27 Dec 2018 23:38)  HR: 67 (28 Dec 2018 08:43) (53 - 89)  BP: 111/65 (28 Dec 2018 08:43) (99/58 - 160/77)  BP(mean): --  RR: 18 (28 Dec 2018 08:43) (17 - 18)  SpO2: 100% (28 Dec 2018 08:43) (99% - 100%)    General: alert  oriented x __3__                   cachexia      HEENT:  dry mouth      Lungs: comfortable       CV: normal     GI:  distended  tender                 Jejunostomy tube  constipation  last BM:     : normal      MSK:  weakness              ambulatory      Skin: normal  ____  no rash    LABS:                        8.8    14.8  )-----------( 389      ( 28 Dec 2018 05:39 )             28.4     12-28    136  |  95<L>  |  17.0  ----------------------------<  166<H>  4.3   |  27.0  |  0.49<L>    Ca    8.9      28 Dec 2018 05:39    I&O's Summary    27 Dec 2018 07:01  -  28 Dec 2018 07:00  --------------------------------------------------------  IN: 180 mL / OUT: 300 mL / NET: -120 mL    RADIOLOGY & ADDITIONAL STUDIES:    ADVANCE DIRECTIVES:   DNR YES   Completed on:                     ELVI  YES    Completed on:  Living Will  NO   Completed on:

## 2018-12-28 NOTE — PHYSICAL THERAPY INITIAL EVALUATION ADULT - ADDITIONAL COMMENTS
Pt lives in a private home with his wife. no steps to navigate. Pt was independent PTA without assist device. Pt does not own DME.

## 2018-12-28 NOTE — CHART NOTE - NSCHARTNOTEFT_GEN_A_CORE
Aware of request to transition Pt to bolus feeds for d/c, however, Pt has just begun TF regimen 12/27. TF's not yet running at goal rate, unable to assess tolerance of feedings or electrolyte status at this time. Pt is chronically, severely malnourished and is at significant increased for refeeding syndrome due to limited nutrition for an extended period of time. RD to follow up closely to assess tolerance and provide further recommendations for bolus feeding pending tolerance of TF rate/formula. Monitor: TF tolerance, Electrolytes-replete as needed, daily weights. RD to remain available.

## 2018-12-28 NOTE — PROGRESS NOTE ADULT - PROBLEM SELECTOR PLAN 5
F/U visit,   Will be discharged soon  Cannot utilize [facility] until he discontinues Chemotherapy F/U visit,  Signing off at this time  Cannot utilize [facility] until he discontinues Chemotherapy-he wants to continue treatment  Discharge planning .

## 2018-12-28 NOTE — PROGRESS NOTE ADULT - SUBJECTIVE AND OBJECTIVE BOX
CC: Follow up after tube feeds started    INTERVAL HPI/ OVERNIGHT EVENTS: Patient seen and examined, tolerating tube feeds at goal of 40ml/hr continuos Denies abdominal pain, nausea, vomiting or diarrhea.       Vital Signs Last 24 Hrs  T(C): 36.5 (28 Dec 2018 08:43), Max: 36.9 (27 Dec 2018 23:38)  T(F): 97.7 (28 Dec 2018 08:43), Max: 98.4 (27 Dec 2018 23:38)  HR: 67 (28 Dec 2018 08:43) (53 - 89)  BP: 111/65 (28 Dec 2018 08:43) (99/58 - 160/77)  BP(mean): --  RR: 18 (28 Dec 2018 08:43) (17 - 18)  SpO2: 100% (28 Dec 2018 08:43) (99% - 100%)    PHYSICAL EXAM:    GENERAL: NAD, AOX3  NECK: Supple, No JVD  CHEST/LUNG: Clear to auscultation bilaterally; No rales, rhonchi, wheezing, or rubs  HEART: Regular rate and rhythm; No murmurs, rubs, or gallops  ABDOMEN: Soft, Nontender, Nondistended; Bowel sounds present  + G tube- site c/d/i   EXTREMITIES:  2+ Peripheral Pulses, No clubbing, cyanosis, or edema        MEDICATIONS  (STANDING):  acetaminophen    Suspension .. 650 milliGRAM(s) Oral every 6 hours  aspirin enteric coated 81 milliGRAM(s) Oral daily  enoxaparin Injectable 40 milliGRAM(s) SubCutaneous daily  levothyroxine 25 MICROGram(s) Oral daily  pantoprazole    Tablet 40 milliGRAM(s) Oral before breakfast  simvastatin 10 milliGRAM(s) Oral at bedtime    MEDICATIONS  (PRN):  ALBUTerol/ipratropium for Nebulization 3 milliLiter(s) Nebulizer every 6 hours PRN Shortness of Breath and/or Wheezing  metoclopramide   Syrup 10 milliGRAM(s) Oral every 6 hours PRN nausea  and vomiting      Allergies    No Known Allergies    Intolerances          LABS:                          8.8    14.8  )-----------( 389      ( 28 Dec 2018 05:39 )             28.4     12-28    136  |  95<L>  |  17.0  ----------------------------<  166<H>  4.3   |  27.0  |  0.49<L>    Ca    8.9      28 Dec 2018 05:39            RADIOLOGY & ADDITIONAL TESTS: CC: Follow up after tube feeds started    INTERVAL HPI/ OVERNIGHT EVENTS: Patient seen and examined, tolerating tube feeds  Denies abdominal pain, nausea, vomiting or diarrhea.       Vital Signs Last 24 Hrs  T(C): 36.5 (28 Dec 2018 08:43), Max: 36.9 (27 Dec 2018 23:38)  T(F): 97.7 (28 Dec 2018 08:43), Max: 98.4 (27 Dec 2018 23:38)  HR: 67 (28 Dec 2018 08:43) (53 - 89)  BP: 111/65 (28 Dec 2018 08:43) (99/58 - 160/77)  BP(mean): --  RR: 18 (28 Dec 2018 08:43) (17 - 18)  SpO2: 100% (28 Dec 2018 08:43) (99% - 100%)    PHYSICAL EXAM:    GENERAL: NAD, AOX3  NECK: Supple, No JVD  CHEST/LUNG: Clear to auscultation bilaterally; No rales, rhonchi, wheezing, or rubs  HEART: Regular rate and rhythm; No murmurs, rubs, or gallops  ABDOMEN: Soft, Nontender, Nondistended; Bowel sounds present  + G tube- site c/d/i   EXTREMITIES:  2+ Peripheral Pulses, No clubbing, cyanosis, or edema        MEDICATIONS  (STANDING):  acetaminophen    Suspension .. 650 milliGRAM(s) Oral every 6 hours  aspirin enteric coated 81 milliGRAM(s) Oral daily  enoxaparin Injectable 40 milliGRAM(s) SubCutaneous daily  levothyroxine 25 MICROGram(s) Oral daily  pantoprazole    Tablet 40 milliGRAM(s) Oral before breakfast  simvastatin 10 milliGRAM(s) Oral at bedtime    MEDICATIONS  (PRN):  ALBUTerol/ipratropium for Nebulization 3 milliLiter(s) Nebulizer every 6 hours PRN Shortness of Breath and/or Wheezing  metoclopramide   Syrup 10 milliGRAM(s) Oral every 6 hours PRN nausea  and vomiting      Allergies    No Known Allergies    Intolerances          LABS:                          8.8    14.8  )-----------( 389      ( 28 Dec 2018 05:39 )             28.4     12-28    136  |  95<L>  |  17.0  ----------------------------<  166<H>  4.3   |  27.0  |  0.49<L>    Ca    8.9      28 Dec 2018 05:39            RADIOLOGY & ADDITIONAL TESTS:

## 2018-12-28 NOTE — PROGRESS NOTE ADULT - PROBLEM SELECTOR PLAN 1
NPO  J Tube placement on 12/26- area is sore --  Tolerating continuous tube feeding  Reglan 10mg liquid to J tube Q6prn  Assess and treat for Nausea/vomiting  measure residual

## 2018-12-29 LAB
ANION GAP SERPL CALC-SCNC: 13 MMOL/L — SIGNIFICANT CHANGE UP (ref 5–17)
ANISOCYTOSIS BLD QL: SLIGHT — SIGNIFICANT CHANGE UP
BUN SERPL-MCNC: 12 MG/DL — SIGNIFICANT CHANGE UP (ref 8–20)
CALCIUM SERPL-MCNC: 9 MG/DL — SIGNIFICANT CHANGE UP (ref 8.6–10.2)
CHLORIDE SERPL-SCNC: 94 MMOL/L — LOW (ref 98–107)
CO2 SERPL-SCNC: 32 MMOL/L — HIGH (ref 22–29)
CREAT SERPL-MCNC: 0.64 MG/DL — SIGNIFICANT CHANGE UP (ref 0.5–1.3)
ELLIPTOCYTES BLD QL SMEAR: SLIGHT — SIGNIFICANT CHANGE UP
EOSINOPHIL NFR BLD AUTO: 1 % — SIGNIFICANT CHANGE UP (ref 0–6)
GLUCOSE SERPL-MCNC: 138 MG/DL — HIGH (ref 70–115)
HCT VFR BLD CALC: 28.1 % — LOW (ref 42–52)
HGB BLD-MCNC: 9 G/DL — LOW (ref 14–18)
HYPOCHROMIA BLD QL: SLIGHT — SIGNIFICANT CHANGE UP
LYMPHOCYTES # BLD AUTO: 8 % — LOW (ref 20–55)
MACROCYTES BLD QL: SLIGHT — SIGNIFICANT CHANGE UP
MCHC RBC-ENTMCNC: 29.9 PG — SIGNIFICANT CHANGE UP (ref 27–31)
MCHC RBC-ENTMCNC: 32 G/DL — SIGNIFICANT CHANGE UP (ref 32–36)
MCV RBC AUTO: 93.4 FL — SIGNIFICANT CHANGE UP (ref 80–94)
MICROCYTES BLD QL: SLIGHT — SIGNIFICANT CHANGE UP
MONOCYTES NFR BLD AUTO: 4 % — SIGNIFICANT CHANGE UP (ref 3–10)
NEUTROPHILS NFR BLD AUTO: 87 % — HIGH (ref 37–73)
OVALOCYTES BLD QL SMEAR: SLIGHT — SIGNIFICANT CHANGE UP
PLAT MORPH BLD: NORMAL — SIGNIFICANT CHANGE UP
PLATELET # BLD AUTO: 428 K/UL — HIGH (ref 150–400)
POIKILOCYTOSIS BLD QL AUTO: SLIGHT — SIGNIFICANT CHANGE UP
POTASSIUM SERPL-MCNC: 3.8 MMOL/L — SIGNIFICANT CHANGE UP (ref 3.5–5.3)
POTASSIUM SERPL-SCNC: 3.8 MMOL/L — SIGNIFICANT CHANGE UP (ref 3.5–5.3)
RBC # BLD: 3.01 M/UL — LOW (ref 4.6–6.2)
RBC # FLD: 15.6 % — SIGNIFICANT CHANGE UP (ref 11–15.6)
RBC BLD AUTO: ABNORMAL
SODIUM SERPL-SCNC: 139 MMOL/L — SIGNIFICANT CHANGE UP (ref 135–145)
WBC # BLD: 18.6 K/UL — HIGH (ref 4.8–10.8)
WBC # FLD AUTO: 18.6 K/UL — HIGH (ref 4.8–10.8)

## 2018-12-29 PROCEDURE — 99233 SBSQ HOSP IP/OBS HIGH 50: CPT

## 2018-12-29 RX ADMIN — PANTOPRAZOLE SODIUM 40 MILLIGRAM(S): 20 TABLET, DELAYED RELEASE ORAL at 05:47

## 2018-12-29 RX ADMIN — ENOXAPARIN SODIUM 40 MILLIGRAM(S): 100 INJECTION SUBCUTANEOUS at 13:25

## 2018-12-29 RX ADMIN — SIMVASTATIN 10 MILLIGRAM(S): 20 TABLET, FILM COATED ORAL at 22:37

## 2018-12-29 RX ADMIN — Medication 81 MILLIGRAM(S): at 13:25

## 2018-12-29 RX ADMIN — Medication 650 MILLIGRAM(S): at 05:48

## 2018-12-29 RX ADMIN — Medication 25 MICROGRAM(S): at 05:47

## 2018-12-29 NOTE — PROGRESS NOTE ADULT - ASSESSMENT
73 year old male with a history of HTN, HLD, hypothyroid, head and neck cancer s/p left sided facial surgery for tumor resection (2007), esophageal cancer with esophageal stricture s/p esophageal stent who received concurrent chemo/XRT with cisplatin/5 flurouracil with his last chemo therapy treatment being 1 month ago. He was admitted in November for chest pain and dysphagia. EGD showed esophageal stent stenosis. He was advised to c/w full liquid diet. CT chest was consistent with bilateral nodules concerning for mets. Currently he returned to the Er for worsening sob, dysphagia and cough. Positive for RVP rhinovirus. He failed dysphagia screen. Completed a total of 7 days of IV zosyn for aspiration pneumonia. Was seen by GI, recommend alterative means of nutrition. G tube placed by IR and tube feeds started. Up titrating feeding to meet goal. In the interim hospital course complicated by leukocytosis, unclear if reactive will monitor. Slow clinical improvement.     SOB likely multifactorial 2/2 to acute  + Rhinovirus/ Aspiration PNA as well as progressive underlying hx of metastatic dz to the lungs   - pt afebrile  -leukocytosis unclear if reactive up to 18 today   - s/p 7 days of IV zosyn for Gram negative and positive aspiration pneumonia   - Sputum culture positive for Klebsiella and staph   - Blood cultures negative   - pt to f/u with heme/onc as an outpt to c/w chemo   - heme/onc f/u noted and appreciated     Worsening Dysphagia 2/2 Esophageal stricture from progression of esophageal cancer:   - Status post G tube placement by IR 12/26 - thus far tolerating current feeding rate   - no further episodes of n/v   - Tolerating continuous  tube feeds at current rate of 10ml will up titrate as recommended by nutrition to goal of 50   - c/w reglan prn   - nutrition f/u noted and appreciated  - GI f/u noted and appreciated     Malignant neoplasm of esophagus (SCC) with likely mets to lung  - c/w supportive care   - c/w ppi peg qd   - heme/ onc f/u noted and appreciated and pt to c/w chemo as an outpt  - palliative care f/u noted and appreciated and at this time is not a candidate for hospice b/c pt will c/w chemo as an outpt     Leukocytosis   - likely reactive   - will monitor, currently pt only has slight abd discomfort if wbc persists may need to ct abd/pelvis to ensure no collection or any other cause for leukocytosis     Anemia of chronic disease   - h/h stable at baseline  - anemia panel noted    Hypothyroidism:   - c/w synthroid 25mcg peg qd     Hyperlipidemia   - c/w zocor 10mg peg qd     Severe protein calorie malnutrition:   - will increase Jevity 1.5 by 10ml q 8 hrs as tolerated via PEG to goal rate of 50ml/hr x 20 hrs daily (1000ml, 1500 kcal, 64g pro)  If pt able to tolerate enteral nutrition at goal rate and is able to tolerate bolus feeds in the future, pt would need approximately 5 cans (240ml) Jevity 1.5 daily (1200ml, 1775 kcal, 75g pro).  - c/w free water 250ml q6hrs   -Nutrition f/u noted and appreciated     DVT PPx  -c/w Lovenox subcut    Advanced Directives: DNR/DNI     Discharge planning: When patient is medically stable to return to home, will need prescription for feeding; converted to bolus feeding. Will d/w CM and SW on monday. PT recommended home with RW, no skilled PT needs.

## 2018-12-29 NOTE — CHART NOTE - NSCHARTNOTEFT_GEN_A_CORE
Aware consult for bolus feeding recommendations.  Pt currently tolerating Jevity 1.5 @ 10ml/hr- aware nausea yesterday, but feeling better today per RN.  Pt is not yet at goal rate.  Pt is chronically, severely malnourished and is at increased risk for refeeding syndrome due to limited nutrition for an extended period of time.  Pt is not appropriate for bolus feedings at this time- will continue to monitor TF tolerance, electrolytes-replete as needed, and daily weights.     Recommendations:  Increase Jevity 1.5 by 10ml q 8 hrs as tolerated via PEG to goal rate of 50ml/hr x 20 hrs daily (1000ml, 1500 kcal, 64g pro)  If pt able to tolerate enteral nutrition at goal rate and is able to tolerate bolus feeds in the future, pt would need approximately 5 cans (240ml) Jevity 1.5 daily (1200ml, 1775 kcal, 75g pro)

## 2018-12-29 NOTE — PROGRESS NOTE ADULT - SUBJECTIVE AND OBJECTIVE BOX
Patient is a 73y old  Male who presents with a chief complaint of SOB (28 Dec 2018 19:31) with improvement in abdominal pain       HEALTH ISSUES - PROBLEM Dx:  Palliative care encounter: Palliative care encounter  Goals of care, counseling/discussion: Goals of care, counseling/discussion  Severe protein-calorie malnutrition: Severe protein-calorie malnutrition  Pneumonia, viral: Pneumonia, viral  Dysphagia, unspecified type: Dysphagia, unspecified type  Malignant neoplasm of esophagus, unspecified location: Malignant neoplasm of esophagus, unspecified location      INTERVAL HPI/OVERNIGHT EVENTS:  Patient seen and examined at bedside. No acute events overnight. Patient states he is feeling better today, no longer has nausea, no episodes of vomiting continues to have slight abdominal discomfort around peg site. No other complaints. Patient denies chest pain, SOB, N/V, fever, chills, dysuria or any other complaints. All remainder ROS negative.         Vital Signs Last 24 Hrs  T(C): 36.6 (29 Dec 2018 16:14), Max: 36.8 (29 Dec 2018 01:35)  T(F): 97.9 (29 Dec 2018 16:14), Max: 98.2 (29 Dec 2018 01:35)  HR: 61 (29 Dec 2018 16:14) (58 - 97)  BP: 100/61 (29 Dec 2018 16:14) (92/57 - 154/76)  BP(mean): --  RR: 19 (29 Dec 2018 16:14) (16 - 20)  SpO2: 98% (29 Dec 2018 08:16) (95% - 100%)      I&O's Summary    28 Dec 2018 07:01  -  29 Dec 2018 07:00  --------------------------------------------------------  IN: 0 mL / OUT: 150 mL / NET: -150 mL    29 Dec 2018 07:01  -  29 Dec 2018 16:24  --------------------------------------------------------  IN: 110 mL / OUT: 0 mL / NET: 110 mL    CONSTITUTIONAL: Well appearing, cachectic, bilateral temporal wasting, awake, alert and in no apparent distress  CARDIAC: Normal rate, regular rhythm.  Heart sounds S1, S2.  No murmurs, rubs or gallops   RESPIRATORY: Breath sounds clear and equal bilaterally. No wheezes, rhales or rhonchi   R chest chemoport   GASTROENTEROLOGY: Soft nt nd bs + normoactive   EXTREMITIES: No edema, cyanosis or deformity   NEUROLOGICAL: Alert and oriented, no focal deficits, no motor or sensory deficits.  SKIN: No rash, skin turgor poor     MEDICATIONS  (STANDING):  aspirin enteric coated 81 milliGRAM(s) Oral daily  enoxaparin Injectable 40 milliGRAM(s) SubCutaneous daily  levothyroxine 25 MICROGram(s) Oral daily  pantoprazole    Tablet 40 milliGRAM(s) Oral before breakfast  simvastatin 10 milliGRAM(s) Oral at bedtime    MEDICATIONS  (PRN):  ALBUTerol/ipratropium for Nebulization 3 milliLiter(s) Nebulizer every 6 hours PRN Shortness of Breath and/or Wheezing  metoclopramide   Syrup 10 milliGRAM(s) Oral every 6 hours PRN nausea  and vomiting      LABS:                        9.0    18.6  )-----------( 428      ( 29 Dec 2018 06:32 )             28.1     12-29    139  |  94<L>  |  12.0  ----------------------------<  138<H>  3.8   |  32.0<H>  |  0.64    Ca    9.0      29 Dec 2018 06:32        RADIOLOGY & ADDITIONAL TESTS:  No new imaging studies

## 2018-12-30 LAB
BASOPHILS # BLD AUTO: 0 K/UL — SIGNIFICANT CHANGE UP (ref 0–0.2)
BASOPHILS NFR BLD AUTO: 0.1 % — SIGNIFICANT CHANGE UP (ref 0–2)
EOSINOPHIL # BLD AUTO: 0.2 K/UL — SIGNIFICANT CHANGE UP (ref 0–0.5)
EOSINOPHIL NFR BLD AUTO: 1.1 % — SIGNIFICANT CHANGE UP (ref 0–6)
HCT VFR BLD CALC: 29 % — LOW (ref 42–52)
HGB BLD-MCNC: 8.9 G/DL — LOW (ref 14–18)
LYMPHOCYTES # BLD AUTO: 0.5 K/UL — LOW (ref 1–4.8)
LYMPHOCYTES # BLD AUTO: 2.9 % — LOW (ref 20–55)
MCHC RBC-ENTMCNC: 29 PG — SIGNIFICANT CHANGE UP (ref 27–31)
MCHC RBC-ENTMCNC: 30.7 G/DL — LOW (ref 32–36)
MCV RBC AUTO: 94.5 FL — HIGH (ref 80–94)
MONOCYTES # BLD AUTO: 0.4 K/UL — SIGNIFICANT CHANGE UP (ref 0–0.8)
MONOCYTES NFR BLD AUTO: 2.1 % — LOW (ref 3–10)
NEUTROPHILS # BLD AUTO: 17.6 K/UL — HIGH (ref 1.8–8)
NEUTROPHILS NFR BLD AUTO: 93.5 % — HIGH (ref 37–73)
PLATELET # BLD AUTO: 476 K/UL — HIGH (ref 150–400)
RBC # BLD: 3.07 M/UL — LOW (ref 4.6–6.2)
RBC # FLD: 15.4 % — SIGNIFICANT CHANGE UP (ref 11–15.6)
WBC # BLD: 18.8 K/UL — HIGH (ref 4.8–10.8)
WBC # FLD AUTO: 18.8 K/UL — HIGH (ref 4.8–10.8)

## 2018-12-30 PROCEDURE — 71250 CT THORAX DX C-: CPT | Mod: 26

## 2018-12-30 PROCEDURE — 74176 CT ABD & PELVIS W/O CONTRAST: CPT | Mod: 26

## 2018-12-30 PROCEDURE — 99233 SBSQ HOSP IP/OBS HIGH 50: CPT

## 2018-12-30 RX ORDER — SACCHAROMYCES BOULARDII 250 MG
250 POWDER IN PACKET (EA) ORAL
Qty: 0 | Refills: 0 | Status: DISCONTINUED | OUTPATIENT
Start: 2018-12-30 | End: 2019-01-01

## 2018-12-30 RX ORDER — PIPERACILLIN AND TAZOBACTAM 4; .5 G/20ML; G/20ML
3.38 INJECTION, POWDER, LYOPHILIZED, FOR SOLUTION INTRAVENOUS EVERY 8 HOURS
Qty: 0 | Refills: 0 | Status: DISCONTINUED | OUTPATIENT
Start: 2018-12-30 | End: 2019-01-01

## 2018-12-30 RX ADMIN — Medication 250 MILLIGRAM(S): at 17:30

## 2018-12-30 RX ADMIN — Medication 25 MICROGRAM(S): at 06:30

## 2018-12-30 RX ADMIN — Medication 81 MILLIGRAM(S): at 13:07

## 2018-12-30 RX ADMIN — Medication 10 MILLIGRAM(S): at 17:30

## 2018-12-30 RX ADMIN — PIPERACILLIN AND TAZOBACTAM 25 GRAM(S): 4; .5 INJECTION, POWDER, LYOPHILIZED, FOR SOLUTION INTRAVENOUS at 22:04

## 2018-12-30 RX ADMIN — PIPERACILLIN AND TAZOBACTAM 25 GRAM(S): 4; .5 INJECTION, POWDER, LYOPHILIZED, FOR SOLUTION INTRAVENOUS at 13:05

## 2018-12-30 RX ADMIN — ENOXAPARIN SODIUM 40 MILLIGRAM(S): 100 INJECTION SUBCUTANEOUS at 13:06

## 2018-12-30 RX ADMIN — PANTOPRAZOLE SODIUM 40 MILLIGRAM(S): 20 TABLET, DELAYED RELEASE ORAL at 06:30

## 2018-12-30 RX ADMIN — SIMVASTATIN 10 MILLIGRAM(S): 20 TABLET, FILM COATED ORAL at 22:04

## 2018-12-30 NOTE — PROGRESS NOTE ADULT - ASSESSMENT
73 year old male with a history of HTN, HLD, hypothyroid, head and neck cancer s/p left sided facial surgery for tumor resection (2007), esophageal cancer with esophageal stricture s/p esophageal stent who received concurrent chemo/XRT with cisplatin/5 flurouracil with his last chemo therapy treatment being 1 month ago. He was admitted in November for chest pain and dysphagia. EGD showed esophageal stent stenosis. He was advised to c/w full liquid diet. CT chest was consistent with bilateral nodules concerning for mets. Currently he returned to the Er for worsening sob, dysphagia and cough. Pt admitted with worsening sob likely multifactorial with RVP positive for rhinovirus/Aspiration PNA. He failed dysphagia screen. Completed a total of 7 days of IV zosyn with negative Bcx. Was seen by GI, recommend alterative means of nutrition. G tube placed by IR and tube feeds started. Up titrating feeding to meet goal. In the interim hospital course complicated by leukocytosis which was up trending, repeated ct chest/ abd on the pt which showed new RLL opacity- Asp PNA pt empirically restarted zosyn.  Slow clinical improvement.     SOB likely multifactorial 2/2 to acute  + Rhinovirus/ Aspiration PNA as well as progressive underlying hx of metastatic dz to the lungs   - pt afebrile  -leukocytosis persists   - s/p 7 days of IV zosyn now restarted given ct chest showed new RLL Opacity, prior lung findings remain with b/l UL apices opacity as well as incidental pulm lung nodules/ cavitary lesions   - Blood cultures negative repeat pending   - pt to f/u with heme/onc as an outpt to c/w chemo   - heme/onc f/u noted and appreciated     Worsening Dysphagia 2/2 Esophageal stricture from progression of esophageal cancer:   - Status post G tube placement by IR 12/26 - thus far tolerating current feeding rate   - no further episodes of n/v   - Tolerating continuous  tube feeds at current rate of 10ml will up titrate as recommended by nutrition to goal of 50   - c/w reglan prn   - nutrition f/u noted and appreciated  - GI f/u noted and appreciated     Malignant neoplasm of esophagus (SCC) with likely mets to lung  - c/w supportive care   - c/w ppi peg qd   - heme/ onc f/u noted and appreciated and pt to c/w chemo as an outpt  - palliative care f/u noted and appreciated and at this time is not a candidate for hospice b/c pt will c/w chemo as an outpt     Anemia of chronic disease   - h/h stable at baseline  - anemia panel noted    Hypothyroidism:   - c/w synthroid 25mcg peg qd     Hyperlipidemia   - c/w zocor 10mg peg qd     Severe protein calorie malnutrition:   - will increase Jevity 1.5 by 10ml q 8 hrs as tolerated via PEG to goal rate of 50ml/hr x 20 hrs daily (1000ml, 1500 kcal, 64g pro)  If pt able to tolerate enteral nutrition at goal rate and is able to tolerate bolus feeds in the future, pt would need approximately 5 cans (240ml) Jevity 1.5 daily (1200ml, 1775 kcal, 75g pro).  - c/w free water 250ml q6hrs   -Nutrition f/u noted and appreciated     DVT PPx  -c/w Lovenox subcut    Advanced Directives: DNR/DNI     Discharge planning: When patient is medically stable to return to home, will need prescription for feeding; converted to bolus feeding. Will d/w CM and SW on monday. PT recommended home with RW, no skilled PT needs.

## 2018-12-30 NOTE — PROGRESS NOTE ADULT - SUBJECTIVE AND OBJECTIVE BOX
Araceli/Onc. FU.    Patient is a 73y Male seen in a FU of esophageal cancer.     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  He has received treatment  with concurrent  CDDP/5-FU and RT recently.  A FU PET scan showed pulmonary lesions, he was sent to Dr. Abebe for a Bx but it was thought he had metastatic esophageal cancer and a bx was not warranted.    Had admission to evaluate for chest pain, difficulty swallowing.  EGD showed narrowing.  Recommendations included diet of full liquids as could not tolerate solids but is unable to swallow due to esophageal narrowing.  Pulmonary nodules noted on CT chest.  No biopsy recommended for lung lesion last admission.  Returns to ER c/o SOB, cough productive of yellow sputum,. pain  in sternum with coughing.  Difficulty swallowing.  Noted to have progressive lung nodules. Possible superimposed infection.  Denies nausea or vomiting, fevers, chills, sweats or night sweats, headaches or dizziness.   Unable to eat only for a few days PTA.      He has been NPO, underwent EGD  which demonstrated complete occlusion.   Had J-tube place 12/26; receiving feedings without complication   wants to continue treatment for his cancer.  palliative consult noted; not hospice candidate at present   CT chest - no PE, RLL PNA     PAST MEDICAL & SURGICAL HISTORY:  Malignant neoplasm of esophagus, unspecified location  High cholesterol  Hypothyroid  HTN (hypertension)  Head and neck cancer: 2007  History of head and neck cancer    MEDICATIONS  (STANDING):  aspirin enteric coated 81 milliGRAM(s) Oral daily  enoxaparin Injectable 40 milliGRAM(s) SubCutaneous daily  levothyroxine 25 MICROGram(s) Oral daily  pantoprazole    Tablet 40 milliGRAM(s) Oral before breakfast  piperacillin/tazobactam IVPB. 3.375 Gram(s) IV Intermittent every 8 hours  saccharomyces boulardii 250 milliGRAM(s) Oral two times a day  simvastatin 10 milliGRAM(s) Oral at bedtime    Vital Signs Last 24 Hrs  T(C): 36.6 (30 Dec 2018 09:17), Max: 36.8 (30 Dec 2018 00:06)  T(F): 97.9 (30 Dec 2018 09:17), Max: 98.3 (30 Dec 2018 00:06)  HR: 68 (30 Dec 2018 09:17) (61 - 68)  BP: 102/68 (30 Dec 2018 09:17) (98/59 - 102/68)  BP(mean): --  RR: 18 (30 Dec 2018 09:17) (18 - 19)  SpO2: 96% (30 Dec 2018 09:17) (96% - 98%)    P/E: Alert and oriented; appears comfortable  Pale  No icterus  No cervical LNs  Lungs: Diminished BS but clear  Heart: RR  Abd: Non-tender  No palpable HSM  Exts: Cachectic.                          8.9    18.8  )-----------( 476      ( 30 Dec 2018 06:39 )             29.0         Assessment: Metastatic esophageal cancer  Had concurrent chemo/RT with CDDP and CI 5 FU  Now  has pulmonary mets and esophageal stricture  Unable to eat.  Had J tube placed - tolerating feeds   Plan for chemo as outpt   RLL PNA on CT chest - on IV Zosyn   Anemia of chronic ds, malignancy - H/H stable       Thank you.

## 2018-12-30 NOTE — PROGRESS NOTE ADULT - SUBJECTIVE AND OBJECTIVE BOX
Patient is a 73y old  Male who presents with a chief complaint of SOB (29 Dec 2018 16:23)      HEALTH ISSUES - PROBLEM Dx:  Palliative care encounter: Palliative care encounter  Goals of care, counseling/discussion: Goals of care, counseling/discussion  Severe protein-calorie malnutrition: Severe protein-calorie malnutrition  Pneumonia, viral: Pneumonia, viral  Dysphagia, unspecified type: Dysphagia, unspecified type  Malignant neoplasm of esophagus, unspecified location: Malignant neoplasm of esophagus, unspecified location          INTERVAL HPI/OVERNIGHT EVENTS:    Vital Signs Last 24 Hrs  T(C): 36.6 (30 Dec 2018 09:17), Max: 36.8 (30 Dec 2018 00:06)  T(F): 97.9 (30 Dec 2018 09:17), Max: 98.3 (30 Dec 2018 00:06)  HR: 68 (30 Dec 2018 09:17) (61 - 68)  BP: 102/68 (30 Dec 2018 09:17) (98/59 - 102/68)  BP(mean): --  RR: 18 (30 Dec 2018 09:17) (18 - 19)  SpO2: 96% (30 Dec 2018 09:17) (96% - 98%)    CAPILLARY BLOOD GLUCOSE          I&O's Summary    29 Dec 2018 07:01  -  30 Dec 2018 07:00  --------------------------------------------------------  IN: 110 mL / OUT: 0 mL / NET: 110 mL        CONSTITUTIONAL: Well appearing, well nourished, awake, alert and in no apparent distress  ENMT: Airway patent, Nasal mucosa clear. Mouth with normal mucosa. Throat has no vesicles, no oropharyngeal exudates and uvula is midline.  EYES: Clear bilaterally, pupils equal, round and reactive to light. EOMI.  CARDIAC: Normal rate, regular rhythm.  Heart sounds S1, S2.  No murmurs, rubs or gallops   RESPIRATORY: Breath sounds clear and equal bilaterally. No wheezes, rhales or rhonchi  MUSCULOSKELETAL: Spine appears normal, range of motion is not limited, no muscle or joint tenderness  EXTREMITIES: No edema, cyanosis or deformity   NEUROLOGICAL: Alert and oriented, no focal deficits, no motor or sensory deficits.  SKIN: No rash, skin turgor     MEDICATIONS  (STANDING):  aspirin enteric coated 81 milliGRAM(s) Oral daily  enoxaparin Injectable 40 milliGRAM(s) SubCutaneous daily  levothyroxine 25 MICROGram(s) Oral daily  pantoprazole    Tablet 40 milliGRAM(s) Oral before breakfast  simvastatin 10 milliGRAM(s) Oral at bedtime    MEDICATIONS  (PRN):  ALBUTerol/ipratropium for Nebulization 3 milliLiter(s) Nebulizer every 6 hours PRN Shortness of Breath and/or Wheezing  metoclopramide   Syrup 10 milliGRAM(s) Oral every 6 hours PRN nausea  and vomiting      LABS:                        8.9    18.8  )-----------( 476      ( 30 Dec 2018 06:39 )             29.0     12-29    139  |  94<L>  |  12.0  ----------------------------<  138<H>  3.8   |  32.0<H>  |  0.64    Ca    9.0      29 Dec 2018 06:32              RADIOLOGY & ADDITIONAL TESTS: Patient is a 73y old  Male who presents with a chief complaint of SOB (29 Dec 2018 16:23) with persistent leukocytosis and recurrent asp pna, with weakness today       HEALTH ISSUES - PROBLEM Dx:  Palliative care encounter: Palliative care encounter  Goals of care, counseling/discussion: Goals of care, counseling/discussion  Severe protein-calorie malnutrition: Severe protein-calorie malnutrition  Pneumonia, viral: Pneumonia, viral  Dysphagia, unspecified type: Dysphagia, unspecified type  Malignant neoplasm of esophagus, unspecified location: Malignant neoplasm of esophagus, unspecified location      INTERVAL HPI/OVERNIGHT EVENTS:  Patient seen and examined at bedside. No acute events overnight. Patient states he is not feeling well today, d/w him that he has leukocytosis and noted new ct chest findings of RLL PNA for which he was started on IV abx. Pt understands the plan of care and is in agreement. Patient denies chest pain, SOB, abd pain, N/V, fever, chills, dysuria or any other complaints. All remainder ROS negative.     Vital Signs Last 24 Hrs  T(C): 36.6 (30 Dec 2018 09:17), Max: 36.8 (30 Dec 2018 00:06)  T(F): 97.9 (30 Dec 2018 09:17), Max: 98.3 (30 Dec 2018 00:06)  HR: 68 (30 Dec 2018 09:17) (61 - 68)  BP: 102/68 (30 Dec 2018 09:17) (98/59 - 102/68)  BP(mean): --  RR: 18 (30 Dec 2018 09:17) (18 - 19)  SpO2: 96% (30 Dec 2018 09:17) (96% - 98%)    CAPILLARY BLOOD GLUCOSE          I&O's Summary    29 Dec 2018 07:01  -  30 Dec 2018 07:00  --------------------------------------------------------  IN: 110 mL / OUT: 0 mL / NET: 110 mL    CONSTITUTIONAL: Well appearing, cachectic, bilateral temporal wasting, awake, alert and in no apparent distress  CARDIAC: Normal rate, regular rhythm.  Heart sounds S1, S2.  No murmurs, rubs or gallops   RESPIRATORY: Breath sounds clear and equal bilaterally. No wheezes, rhales or rhonchi   R chest chemoport   GASTROENTEROLOGY: Soft nt nd bs + normoactive   EXTREMITIES: No edema, cyanosis or deformity   NEUROLOGICAL: Alert and oriented, no focal deficits, no motor or sensory deficits.  SKIN: No rash, skin turgor poor     MEDICATIONS  (STANDING):  aspirin enteric coated 81 milliGRAM(s) Oral daily  enoxaparin Injectable 40 milliGRAM(s) SubCutaneous daily  levothyroxine 25 MICROGram(s) Oral daily  pantoprazole    Tablet 40 milliGRAM(s) Oral before breakfast  simvastatin 10 milliGRAM(s) Oral at bedtime    MEDICATIONS  (PRN):  ALBUTerol/ipratropium for Nebulization 3 milliLiter(s) Nebulizer every 6 hours PRN Shortness of Breath and/or Wheezing  metoclopramide   Syrup 10 milliGRAM(s) Oral every 6 hours PRN nausea  and vomiting      LABS:                        8.9    18.8  )-----------( 476      ( 30 Dec 2018 06:39 )             29.0     12-29    139  |  94<L>  |  12.0  ----------------------------<  138<H>  3.8   |  32.0<H>  |  0.64    Ca    9.0      29 Dec 2018 06:32      RADIOLOGY & ADDITIONAL TESTS:  CT chest/ abd pelvis:    IMPRESSION:     Bilateral lung nodules some of which are cavitary are again noted and   some of the nodules have increased in size.    Interval development of right lower lobe pneumonia.    Interval development of small bilateral pleural effusions and atelectasis   at the left lung base.    Interval development of a new enlarged gastrohepatic lymph node.    Stable indeterminate right adrenal gland nodule.    Interval development of a small amount of pelvic ascites.

## 2018-12-31 LAB
BASOPHILS # BLD AUTO: 0 K/UL — SIGNIFICANT CHANGE UP (ref 0–0.2)
BASOPHILS NFR BLD AUTO: 0.1 % — SIGNIFICANT CHANGE UP (ref 0–2)
EOSINOPHIL # BLD AUTO: 0.1 K/UL — SIGNIFICANT CHANGE UP (ref 0–0.5)
EOSINOPHIL NFR BLD AUTO: 0.7 % — SIGNIFICANT CHANGE UP (ref 0–5)
HCT VFR BLD CALC: 27.4 % — LOW (ref 42–52)
HGB BLD-MCNC: 8.4 G/DL — LOW (ref 14–18)
LYMPHOCYTES # BLD AUTO: 0.7 K/UL — LOW (ref 1–4.8)
LYMPHOCYTES # BLD AUTO: 5.3 % — LOW (ref 20–55)
MCHC RBC-ENTMCNC: 29.2 PG — SIGNIFICANT CHANGE UP (ref 27–31)
MCHC RBC-ENTMCNC: 30.7 G/DL — LOW (ref 32–36)
MCV RBC AUTO: 95.1 FL — HIGH (ref 80–94)
MONOCYTES # BLD AUTO: 0.6 K/UL — SIGNIFICANT CHANGE UP (ref 0–0.8)
MONOCYTES NFR BLD AUTO: 4.3 % — SIGNIFICANT CHANGE UP (ref 3–10)
NEUTROPHILS # BLD AUTO: 12.3 K/UL — HIGH (ref 1.8–8)
NEUTROPHILS NFR BLD AUTO: 89 % — HIGH (ref 37–73)
PLATELET # BLD AUTO: 417 K/UL — HIGH (ref 150–400)
RBC # BLD: 2.88 M/UL — LOW (ref 4.6–6.2)
RBC # FLD: 16 % — HIGH (ref 11–15.6)
WBC # BLD: 13.8 K/UL — HIGH (ref 4.8–10.8)
WBC # FLD AUTO: 13.8 K/UL — HIGH (ref 4.8–10.8)

## 2018-12-31 PROCEDURE — 99232 SBSQ HOSP IP/OBS MODERATE 35: CPT

## 2018-12-31 RX ORDER — PANTOPRAZOLE SODIUM 20 MG/1
40 TABLET, DELAYED RELEASE ORAL
Qty: 0 | Refills: 0 | Status: DISCONTINUED | OUTPATIENT
Start: 2018-12-31 | End: 2019-01-01

## 2018-12-31 RX ADMIN — ENOXAPARIN SODIUM 40 MILLIGRAM(S): 100 INJECTION SUBCUTANEOUS at 12:27

## 2018-12-31 RX ADMIN — Medication 25 MICROGRAM(S): at 06:32

## 2018-12-31 RX ADMIN — SIMVASTATIN 10 MILLIGRAM(S): 20 TABLET, FILM COATED ORAL at 23:19

## 2018-12-31 RX ADMIN — Medication 250 MILLIGRAM(S): at 06:32

## 2018-12-31 RX ADMIN — PIPERACILLIN AND TAZOBACTAM 25 GRAM(S): 4; .5 INJECTION, POWDER, LYOPHILIZED, FOR SOLUTION INTRAVENOUS at 14:15

## 2018-12-31 RX ADMIN — PIPERACILLIN AND TAZOBACTAM 25 GRAM(S): 4; .5 INJECTION, POWDER, LYOPHILIZED, FOR SOLUTION INTRAVENOUS at 23:20

## 2018-12-31 RX ADMIN — Medication 250 MILLIGRAM(S): at 18:03

## 2018-12-31 RX ADMIN — PANTOPRAZOLE SODIUM 40 MILLIGRAM(S): 20 TABLET, DELAYED RELEASE ORAL at 06:32

## 2018-12-31 RX ADMIN — Medication 81 MILLIGRAM(S): at 12:27

## 2018-12-31 RX ADMIN — PIPERACILLIN AND TAZOBACTAM 25 GRAM(S): 4; .5 INJECTION, POWDER, LYOPHILIZED, FOR SOLUTION INTRAVENOUS at 06:32

## 2018-12-31 NOTE — PROGRESS NOTE ADULT - ASSESSMENT
73 year old male with a history of HTN, HLD, hypothyroid, head and neck cancer s/p left sided facial surgery for tumor resection (2007), esophageal cancer with esophageal stricture s/p esophageal stent who received concurrent chemo/XRT with cisplatin/5 flurouracil with his last chemo therapy treatment being 1 month ago. He was admitted in November for chest pain and dysphagia. EGD showed esophageal stent stenosis. He was advised to c/w full liquid diet. CT chest was consistent with bilateral nodules concerning for mets. Currently he returned to the Er for worsening sob, dysphagia and cough. Pt admitted with worsening sob likely multifactorial with RVP positive for rhinovirus/Aspiration PNA. He failed dysphagia screen. Completed a total of 7 days of IV zosyn with negative Bcx. Was seen by GI, recommend alterative means of nutrition. G tube placed by IR and tube feeds started. Up titrating feeding to meet goal. In the interim hospital course complicated by leukocytosis which was up trending, repeated ct chest/ abd on the pt which showed new RLL opacity- Asp PNA pt empirically restarted zosyn.  Slow clinical improvement.     SOB likely multifactorial 2/2 to acute  + Rhinovirus/ Aspiration PNA as well as progressive underlying hx of metastatic dz to the lungs   - pt afebrile  -leukocytosis persists   - s/p 7 days of IV zosyn now restarted given ct chest showed new RLL Opacity, prior lung findings remain with b/l UL apices opacity as well as incidental pulm lung nodules/ cavitary lesions   - Blood cultures negative repeat pending   - pt to f/u with heme/onc as an outpt to c/w chemo   - heme/onc f/u noted and appreciated     Worsening Dysphagia 2/2 Esophageal stricture from progression of esophageal cancer:   - Status post G tube placement by IR 12/26 - thus far tolerating feeding.  This is the only source of nutrition for patient as patient is NPO.   - no further episodes of n/v   - c/w reglan prn   - nutrition f/u noted and appreciated  - GI f/u noted and appreciated     Malignant neoplasm of esophagus (SCC) with likely mets to lung  - c/w supportive care   - c/w ppi peg qd   - heme/ onc f/u noted and appreciated and pt to c/w chemo as an outpt  - palliative care f/u noted and appreciated and at this time is not a candidate for hospice b/c pt will c/w chemo as an outpt     Anemia of chronic disease   - h/h stable at baseline  - anemia panel noted    Hypothyroidism:   - c/w synthroid 25mcg peg qd     Hyperlipidemia   - c/w zocor 10mg peg qd     Severe protein calorie malnutrition:   - will increase Jevity 1.5 by 10ml q 8 hrs as tolerated via PEG to goal rate of 50ml/hr x 20 hrs daily (1000ml, 1500 kcal, 64g pro)  If pt able to tolerate enteral nutrition at goal rate and is able to tolerate bolus feeds in the future, pt would need approximately 5 cans (240ml) Jevity 1.5 daily (1200ml, 1775 kcal, 75g pro).  - c/w free water 250ml q6hrs   -Nutrition f/u noted and appreciated     DVT PPx  -c/w Lovenox subcut    Advanced Directives: DNR/DNI     Discharge planning: Prescription for feeding given to ; converted to bolus feeding. PT recommended home with RW, no skilled PT needs. 73 year old male with a history of HTN, HLD, hypothyroid, head and neck cancer s/p left sided facial surgery for tumor resection (2007), esophageal cancer with esophageal stricture s/p esophageal stent who received concurrent chemo/XRT with cisplatin/5 flurouracil with his last chemo therapy treatment being 1 month ago. He was admitted in November for chest pain and dysphagia. EGD showed esophageal stent stenosis. He was advised to c/w full liquid diet. CT chest was consistent with bilateral nodules concerning for mets. Currently he returned to the Er for worsening sob, dysphagia and cough. Pt admitted with worsening sob likely multifactorial with RVP positive for rhinovirus/Aspiration PNA. He failed dysphagia screen. Completed a total of 7 days of IV zosyn with negative Bcx. Was seen by GI, recommend alterative means of nutrition. G tube placed by IR and tube feeds started. Up titrating feeding to meet goal. In the interim hospital course complicated by leukocytosis which was up trending, repeated ct chest/ abd on the pt which showed new RLL opacity- Asp PNA pt empirically restarted zosyn to finish todal 7 day course, will switch to po on discharge.  Slow clinical improvement. Anticipated discharge in 24 hours if bolus feeding obtained for home.     SOB likely multifactorial 2/2 to acute  + Rhinovirus/ Aspiration PNA as well as progressive underlying hx of metastatic dz to the lungs   - pt afebrile  -leukocytosis persists   - s/p 7 days of IV zosyn now restarted given ct chest showed new RLL Opacity, prior lung findings remain with b/l UL apices opacity as well as incidental pulm lung nodules/ cavitary lesions   - c/w zosyn D#2/7 - will change to Augmentin on discharge   - Blood cultures negative repeat pending   - pt to f/u with heme/onc as an outpt to c/w chemo   - heme/onc f/u noted and appreciated     Worsening Dysphagia 2/2 Esophageal stricture from progression of esophageal cancer:   - Status post G tube placement by IR 12/26 - thus far tolerating feeding.  This is the only source of nutrition for patient as patient is NPO.   - no further episodes of n/v   - c/w reglan prn   - nutrition f/u noted and appreciated  - GI f/u noted and appreciated     Malignant neoplasm of esophagus (SCC) with likely mets to lung  - c/w supportive care   - c/w ppi peg qd   - heme/ onc f/u noted and appreciated and pt to c/w chemo as an outpt  - palliative care f/u noted and appreciated and at this time is not a candidate for hospice b/c pt will c/w chemo as an outpt     Anemia of chronic disease   - h/h stable at baseline  - anemia panel noted    Hypothyroidism:   - c/w synthroid 25mcg peg qd     Hyperlipidemia   - c/w zocor 10mg peg qd     Severe protein calorie malnutrition:   - will increase Jevity 1.5 by 10ml q 8 hrs as tolerated via PEG to goal rate of 50ml/hr x 20 hrs daily (1000ml, 1500 kcal, 64g pro)  If pt able to tolerate enteral nutrition at goal rate and is able to tolerate bolus feeds in the future, pt would need approximately 5 cans (240ml) Jevity 1.5 daily (1200ml, 1775 kcal, 75g pro).  - c/w free water 250ml q6hrs   -Nutrition f/u noted and appreciated     DVT PPx  -c/w Lovenox subcut    Advanced Directives: DNR/DNI     Discharge planning: Prescription for feeding given to ; converted to bolus feeding. PT recommended home with RW, no skilled PT needs. Anticipated discharge in 24 hours if bolus feeding obtained. 73 year old male with a history of HTN, HLD, hypothyroid, head and neck cancer s/p left sided facial surgery for tumor resection (2007), esophageal cancer with esophageal stricture s/p esophageal stent who received concurrent chemo/XRT with cisplatin/5 flurouracil with his last chemo therapy treatment being 1 month ago. He was admitted in November for chest pain and dysphagia. EGD showed esophageal stent stenosis. He was advised to c/w full liquid diet. CT chest was consistent with bilateral nodules concerning for mets. Currently he returned to the Er for worsening sob, dysphagia and cough. Pt admitted with worsening sob likely multifactorial with RVP positive for rhinovirus/Aspiration PNA. He failed dysphagia screen. Completed a total of 7 days of IV zosyn with negative Bcx. Was seen by GI, recommend alterative means of nutrition. G tube placed by IR and tube feeds started. Up titrating feeding to meet goal. In the interim hospital course complicated by leukocytosis which was up trending, repeated ct chest/ abd on the pt which showed new RLL opacity- Asp PNA pt empirically restarted zosyn to finish todal 7 day course, will switch to po on discharge.  Slow clinical improvement. Anticipated discharge in 24 hours if bolus feeding obtained for home.     SOB likely multifactorial 2/2 to acute  + Rhinovirus/ Aspiration PNA as well as progressive underlying hx of metastatic dz to the lungs   - pt afebrile  -leukocytosis down trended to 13   - s/p 7 days of IV zosyn now restarted given ct chest showed new RLL Opacity, prior lung findings remain with b/l UL apices opacity as well as incidental pulm lung nodules/ cavitary lesions   - c/w zosyn D#2/7 - will change to Augmentin on discharge   - Blood cultures negative repeat pending   - pt to f/u with heme/onc as an outpt to c/w chemo   - heme/onc f/u noted and appreciated     Worsening Dysphagia 2/2 Esophageal stricture from progression of esophageal cancer:   - Status post G tube placement by IR 12/26 - thus far tolerating feeding.  This is the only source of nutrition for patient as patient is NPO.   - no further episodes of n/v   - c/w reglan prn   - nutrition f/u noted and appreciated  - GI f/u noted and appreciated     Malignant neoplasm of esophagus (SCC) with likely mets to lung  - c/w supportive care   - c/w ppi peg qd   - heme/ onc f/u noted and appreciated and pt to c/w chemo as an outpt  - palliative care f/u noted and appreciated and at this time is not a candidate for hospice b/c pt will c/w chemo as an outpt     Anemia of chronic disease   - h/h stable at baseline  - anemia panel noted    Hypothyroidism:   - c/w synthroid 25mcg peg qd     Hyperlipidemia   - c/w zocor 10mg peg qd     Severe protein calorie malnutrition:   - will increase Jevity 1.5 by 10ml q 8 hrs as tolerated via PEG to goal rate of 50ml/hr x 20 hrs daily (1000ml, 1500 kcal, 64g pro)  If pt able to tolerate enteral nutrition at goal rate and is able to tolerate bolus feeds in the future, pt would need approximately 5 cans (240ml) Jevity 1.5 daily (1200ml, 1775 kcal, 75g pro).  - c/w free water 250ml q6hrs   -Nutrition f/u noted and appreciated     DVT PPx  -c/w Lovenox subcut    Advanced Directives: DNR/DNI     Discharge planning: Prescription for feeding given to ; converted to bolus feeding. PT recommended home with RW, no skilled PT needs. Anticipated discharge in 24 hours if bolus feeding obtained.

## 2018-12-31 NOTE — PROGRESS NOTE ADULT - SUBJECTIVE AND OBJECTIVE BOX
CC: SOB     HPI:  72 y/o [place] speaking male, with PMH of head and neck cancer s/p left sided facial surgery for tumor resection (2007), Esophageal cancer with esophageal stricture s/p esophageal stent (with Dr. Herring) who received concurrent chemo/XRT with cisplatin/5 flurouracil with his last chemo therapy treatment being 1 month ago with Dr. Delacruz. Pt presented to [facility] ED on 11/12 with 2 days of pain in his chest with swallowing liquids and solids and regurgitation. CT scan for evaluation showed narrowing of esophagus superiorly to the stent as well as concern for progression of disease with possible lung metastasis. On 11/15 pt underwent EGD with GI, found 5mm in diameter stenosis of esophageal stent, without any obstruction or debris noted and per GI has been advanced to a pureed with honey thickened diet. Intact esophageal stent. Luminal narrowing.  No tumor ingrowth or overgrowth.  Needs to stay on a full liquid diet with ensure supplements.  No solid food as lumen is too narrow to accommodate solid food. Pt was asked to f/u with GI  as outpatient. Also found to have CT chest consistent with new b/l Lung nodules with concern for metastasis and was recommended to f/u with CTS as outpatient for possible biopsy. Pt was discharged from [facility] at 11/16/18.  Patient returns to ED with similar complaints of SOB, productive cough & dysphagia that began today. CTA: No pulmonary embolus.Interval increase in size of numerous bilateral metastatic nodules, some of which are cavitary.  Superimposed tree-in-bud nodular airspace opacities, predominantly within the left upper lobe, which may represent superimposed infection. RVP +ve for rhinovirus. Pt failed dysphagia screen in the ED when he started choking & coughing on a small amount of thin liquids (20 Dec 2018 13:00)    INTERVAL HPI/OVERNIGHT EVENTS: Patient seen and examined lying in bed.  Tolerating tube feeds.  Patient denies any headache, dizziness, SOB, CP, abdominal pain, nausea, vomiting, dysuria.  Other ROS reviewed and are negative.      Vital Signs Last 24 Hrs  T(C): 36.9 (31 Dec 2018 08:05), Max: 37.2 (30 Dec 2018 16:13)  T(F): 98.5 (31 Dec 2018 08:05), Max: 99 (30 Dec 2018 16:13)  HR: 62 (31 Dec 2018 08:05) (62 - 67)  BP: 106/62 (31 Dec 2018 08:05) (96/60 - 112/63)  BP(mean): --  RR: 20 (31 Dec 2018 08:05) (19 - 20)  SpO2: 100% (31 Dec 2018 08:05) (98% - 100%)  I&O's Detail    30 Dec 2018 07:01  -  31 Dec 2018 07:00  --------------------------------------------------------  IN:    Free Water: 500 mL    IV PiggyBack: 125 mL    ns in tub fed  [id number]: 730 mL  Total IN: 1355 mL    OUT:    Voided: 600 mL  Total OUT: 600 mL    Total NET: 755 mL    PHYSICAL EXAM:  GENERAL: NAD  HEAD:  Atraumatic, Normocephalic  NECK: Supple, No JVD, Normal thyroid  NERVOUS SYSTEM:  Alert & Oriented X3, Good concentration; Motor Strength 5/5 B/L upper and lower extremities  CHEST/LUNG: Clear to auscultation bilaterally; No rales, rhonchi, wheezing, or rubs  HEART: Regular rate and rhythm; No murmurs, rubs, or gallops  ABDOMEN: Soft, Nontender, Nondistended; Bowel sounds present; (+) peg  EXTREMITIES:  2+ Peripheral Pulses, No clubbing, cyanosis, or edema                            8.4    13.8  )-----------( 417      ( 31 Dec 2018 07:39 )             27.4         MEDICATIONS  (STANDING):  aspirin enteric coated 81 milliGRAM(s) Oral daily  enoxaparin Injectable 40 milliGRAM(s) SubCutaneous daily  levothyroxine 25 MICROGram(s) Oral daily  pantoprazole   Suspension 40 milliGRAM(s) Oral before breakfast  piperacillin/tazobactam IVPB. 3.375 Gram(s) IV Intermittent every 8 hours  saccharomyces boulardii 250 milliGRAM(s) Oral two times a day  simvastatin 10 milliGRAM(s) Oral at bedtime    MEDICATIONS  (PRN):  ALBUTerol/ipratropium for Nebulization 3 milliLiter(s) Nebulizer every 6 hours PRN Shortness of Breath and/or Wheezing  metoclopramide   Syrup 10 milliGRAM(s) Oral every 6 hours PRN nausea  and vomiting      RADIOLOGY & ADDITIONAL TESTS:  < from: CT Chest No Cont (12.30.18 @ 09:49) >     EXAM:  CT ABDOMEN AND PELVIS                         EXAM:  CT CHEST                          PROCEDURE DATE:  12/30/2018          INTERPRETATION:  Clinical information: Leukocytosis. Evaluate for   aspiration pneumonia. Shortness of breath. Esophageal cancer.    Comparison: Chest CT dated 12/20/2018 and CT scan of the abdomen pelvis   dated 11/12/2018    PROCEDURE:   CT of the Chest, abdomen and pelvis was performed without intravenous   contrast.  Oral contrast was administered.          FINDINGS:    CHEST:    LUNG AND LARGE AIRWAYS: Interval development of an opacity in the right   lower lobe with air bronchograms compatible with pneumonia. Interval   development of a small right pleural effusion and small left pleural   effusion. Thereis interval development of atelectasis at the left lung   base. There is mucoid impaction in right lower lobe bronchi. Opacities at   both lung apices are unchanged. Patchy groundglass involving both upper   lobes again noted. Bilateral lung nodules again noted. A 2.8 cm right   upper lobe nodule previously measured 2.3 cm and is no longer cavitary. A   1.2 cm right upper lobe nodule is stable. A previously identified right   middle lobe nodule measures 2.0 cm and has undergone interval cavitation   previously 1.7 cm. Previously identified nodules in the right lower lobe   are partially obscured by the new pneumonia. A left upper lobe nodule   measures 2.2 cm, stable. A cavitary left upper lobe nodule measures 1.2   cm previously 0.9 cm. Nodules in the lingula are again noted measuring up   to 1.3 cm, previously 1.0 cm. A left lower lobe nodule measures 2.6 cm,   previously 1.8 cm.  VESSELS: Right anterior chest wall Mediport catheter with tip in the   superior vena cava.Atherosclerotic changes in the aorta and coronary   arteries  HEART: normal size. No pericardial effusion.  MEDIASTINUM AND LILLIE: No enlarged lymph nodes. Stent in the distal   esophagus is again noted.  CHEST WALL AND LOWER NECK: within normal limits.    ABDOMEN:  LIVER: within normal limits.  BILE DUCTS: normal caliber.  GALLBLADDER: Distended.  PANCREAS: within normal limits.  SPLEEN: Normal size containing calcified granulomas.  ADRENALS: There is a stable 1.3 cm indeterminate right adrenal nodule.  KIDNEYS: within normal limits.    PELVIS:  REPRODUCTIVE ORGANS: no pelvic masses.  URETERS: within normal limits.  BLADDER: within normal limits.      BOWEL: There is a gastrostomy tube. Stomach is substantially distended   with air and a small amount of fluid.  PERITONEUM: Interval development of a small amount of pelvic ascites.  A   1.2 cm partially calcified nodule in the gastrohepatic ligament region is   unchanged. There is a newly noted 1.8 cm gastrohepatic ligament lymph   node.  VESSELS: Atherosclerotic changes      RETROPERITONEUM: No enlarged retroperitoneal or pelvic nodes.  ABDOMINAL WALL: within normal limits.  BONES: within normal limits.    IMPRESSION:     Bilateral lung nodules some of which are cavitary are again noted and   some of the nodules have increased in size.    Interval development of right lower lobe pneumonia.    Interval development of small bilateral pleural effusions and atelectasis   at the left lung base.    Interval development of a new enlarged gastrohepatic lymph node.    Stable indeterminate right adrenal gland nodule.    Interval development of a small amount of pelvic ascites.                BRINDA GOMES M.D., ATTENDING RADIOLOGIST  This document has been electronically signed. Dec 30 2018 10:31AM                 < end of copied text > Patient is a 73y old  Male who presents with a chief complaint of SOB (29 Dec 2018 16:23) with persistent leukocytosis and recurrent asp pna, with weakness today       HEALTH ISSUES - PROBLEM Dx:  Palliative care encounter: Palliative care encounter  Goals of care, counseling/discussion: Goals of care, counseling/discussion  Severe protein-calorie malnutrition: Severe protein-calorie malnutrition  Pneumonia, viral: Pneumonia, viral  Dysphagia, unspecified type: Dysphagia, unspecified type  Malignant neoplasm of esophagus, unspecified location: Malignant neoplasm of esophagus, unspecified location    INTERVAL HPI/OVERNIGHT EVENTS: Patient seen and examined lying in bed.  Tolerating tube feeds. SOB improving, minimal cough that is dry. Patient denies any headache, dizziness, SOB, CP, abdominal pain, nausea, vomiting, dysuria.  Other ROS reviewed and are negative.      Vital Signs Last 24 Hrs  T(C): 36.9 (31 Dec 2018 08:05), Max: 37.2 (30 Dec 2018 16:13)  T(F): 98.5 (31 Dec 2018 08:05), Max: 99 (30 Dec 2018 16:13)  HR: 62 (31 Dec 2018 08:05) (62 - 67)  BP: 106/62 (31 Dec 2018 08:05) (96/60 - 112/63)  BP(mean): --  RR: 20 (31 Dec 2018 08:05) (19 - 20)  SpO2: 100% (31 Dec 2018 08:05) (98% - 100%)  I&O's Detail    30 Dec 2018 07:01  -  31 Dec 2018 07:00  --------------------------------------------------------  IN:    Free Water: 500 mL    IV PiggyBack: 125 mL    ns in tub fed  [id number]: 730 mL  Total IN: 1355 mL    OUT:    Voided: 600 mL  Total OUT: 600 mL    Total NET: 755 mL    PHYSICAL EXAM:  GENERAL: NAD  HEAD:  Atraumatic, Normocephalic  NECK: Supple, No JVD, Normal thyroid  NERVOUS SYSTEM:  Alert & Oriented X3, Good concentration; Motor Strength 5/5 B/L upper and lower extremities  CHEST/LUNG: Clear to auscultation bilaterally; No rales, rhonchi, wheezing, or rubs  HEART: Regular rate and rhythm; No murmurs, rubs, or gallops  ABDOMEN: Soft, Nontender, Nondistended; Bowel sounds present; (+) peg  EXTREMITIES:  2+ Peripheral Pulses, No clubbing, cyanosis, or edema                            8.4    13.8  )-----------( 417      ( 31 Dec 2018 07:39 )             27.4         MEDICATIONS  (STANDING):  aspirin enteric coated 81 milliGRAM(s) Oral daily  enoxaparin Injectable 40 milliGRAM(s) SubCutaneous daily  levothyroxine 25 MICROGram(s) Oral daily  pantoprazole   Suspension 40 milliGRAM(s) Oral before breakfast  piperacillin/tazobactam IVPB. 3.375 Gram(s) IV Intermittent every 8 hours  saccharomyces boulardii 250 milliGRAM(s) Oral two times a day  simvastatin 10 milliGRAM(s) Oral at bedtime    MEDICATIONS  (PRN):  ALBUTerol/ipratropium for Nebulization 3 milliLiter(s) Nebulizer every 6 hours PRN Shortness of Breath and/or Wheezing  metoclopramide   Syrup 10 milliGRAM(s) Oral every 6 hours PRN nausea  and vomiting      RADIOLOGY & ADDITIONAL TESTS:  < from: CT Chest No Cont (12.30.18 @ 09:49) >     EXAM:  CT ABDOMEN AND PELVIS                         EXAM:  CT CHEST                          PROCEDURE DATE:  12/30/2018          INTERPRETATION:  Clinical information: Leukocytosis. Evaluate for   aspiration pneumonia. Shortness of breath. Esophageal cancer.    Comparison: Chest CT dated 12/20/2018 and CT scan of the abdomen pelvis   dated 11/12/2018    PROCEDURE:   CT of the Chest, abdomen and pelvis was performed without intravenous   contrast.  Oral contrast was administered.          FINDINGS:    CHEST:    LUNG AND LARGE AIRWAYS: Interval development of an opacity in the right   lower lobe with air bronchograms compatible with pneumonia. Interval   development of a small right pleural effusion and small left pleural   effusion. Thereis interval development of atelectasis at the left lung   base. There is mucoid impaction in right lower lobe bronchi. Opacities at   both lung apices are unchanged. Patchy groundglass involving both upper   lobes again noted. Bilateral lung nodules again noted. A 2.8 cm right   upper lobe nodule previously measured 2.3 cm and is no longer cavitary. A   1.2 cm right upper lobe nodule is stable. A previously identified right   middle lobe nodule measures 2.0 cm and has undergone interval cavitation   previously 1.7 cm. Previously identified nodules in the right lower lobe   are partially obscured by the new pneumonia. A left upper lobe nodule   measures 2.2 cm, stable. A cavitary left upper lobe nodule measures 1.2   cm previously 0.9 cm. Nodules in the lingula are again noted measuring up   to 1.3 cm, previously 1.0 cm. A left lower lobe nodule measures 2.6 cm,   previously 1.8 cm.  VESSELS: Right anterior chest wall Mediport catheter with tip in the   superior vena cava.Atherosclerotic changes in the aorta and coronary   arteries  HEART: normal size. No pericardial effusion.  MEDIASTINUM AND LILLIE: No enlarged lymph nodes. Stent in the distal   esophagus is again noted.  CHEST WALL AND LOWER NECK: within normal limits.    ABDOMEN:  LIVER: within normal limits.  BILE DUCTS: normal caliber.  GALLBLADDER: Distended.  PANCREAS: within normal limits.  SPLEEN: Normal size containing calcified granulomas.  ADRENALS: There is a stable 1.3 cm indeterminate right adrenal nodule.  KIDNEYS: within normal limits.    PELVIS:  REPRODUCTIVE ORGANS: no pelvic masses.  URETERS: within normal limits.  BLADDER: within normal limits.      BOWEL: There is a gastrostomy tube. Stomach is substantially distended   with air and a small amount of fluid.  PERITONEUM: Interval development of a small amount of pelvic ascites.  A   1.2 cm partially calcified nodule in the gastrohepatic ligament region is   unchanged. There is a newly noted 1.8 cm gastrohepatic ligament lymph   node.  VESSELS: Atherosclerotic changes      RETROPERITONEUM: No enlarged retroperitoneal or pelvic nodes.  ABDOMINAL WALL: within normal limits.  BONES: within normal limits.    IMPRESSION:     Bilateral lung nodules some of which are cavitary are again noted and   some of the nodules have increased in size.    Interval development of right lower lobe pneumonia.    Interval development of small bilateral pleural effusions and atelectasis   at the left lung base.    Interval development of a new enlarged gastrohepatic lymph node.    Stable indeterminate right adrenal gland nodule.    Interval development of a small amount of pelvic ascites.                BRINDA GOMES M.D., ATTENDING RADIOLOGIST  This document has been electronically signed. Dec 30 2018 10:31AM                 < end of copied text >

## 2018-12-31 NOTE — PROGRESS NOTE ADULT - SUBJECTIVE AND OBJECTIVE BOX
· Subjective and Objective: 	  Heme/Onc. FU.    Patient is a 73y Male seen in a FU of esophageal cancer.     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  He has received treatment  with concurrent  CDDP/5-FU and RT recently.  A FU PET scan showed pulmonary lesions, he was sent to Dr. Abebe for a Bx but it was thought he had metastatic esophageal cancer and a bx was not warranted.    Had admission to evaluate for chest pain, difficulty swallowing.  EGD showed narrowing.  Recommendations included diet of full liquids as could not tolerate solids but is unable to swallow due to esophageal narrowing.  Pulmonary nodules noted on CT chest.  No biopsy recommended for lung lesion last admission.  Returns to ER c/o SOB, cough productive of yellow sputum,. pain  in sternum with coughing.  Difficulty swallowing.  Noted to have progressive lung nodules. Possible superimposed infection.  Denies nausea or vomiting, fevers, chills, sweats or night sweats, headaches or dizziness.   Unable to eat only for a few days PTA.      He had been NPO, underwent EGD  which demonstrated complete occlusion.   Had J-tube place 12/26/18; receiving feedings without complications  He   wants to continue treatment for his cancer.  CT chest - no PE, RLL PNA     PAST MEDICAL & SURGICAL HISTORY:  Malignant neoplasm of esophagus, unspecified location  High cholesterol  Hypothyroid  HTN (hypertension)  Head and neck cancer: 2007  History of head and neck cancer    MEDICATIONS  (STANDING):  aspirin enteric coated 81 milliGRAM(s) Oral daily  enoxaparin Injectable 40 milliGRAM(s) SubCutaneous daily  levothyroxine 25 MICROGram(s) Oral daily  pantoprazole    Tablet 40 milliGRAM(s) Oral before breakfast  piperacillin/tazobactam IVPB. 3.375 Gram(s) IV Intermittent every 8 hours  saccharomyces boulardii 250 milliGRAM(s) Oral two times a day  simvastatin 10 milliGRAM(s) Oral at bedtime    Vital Signs Last 24 Hrs  T(C): 36.9 (31 Dec 2018 08:05), Max: 37.2 (30 Dec 2018 16:13)  T(F): 98.5 (31 Dec 2018 08:05), Max: 99 (30 Dec 2018 16:13)  HR: 62 (31 Dec 2018 08:05) (62 - 67)  BP: 106/62 (31 Dec 2018 08:05) (96/60 - 112/63)  BP(mean): --  RR: 20 (31 Dec 2018 08:05) (19 - 20)  SpO2: 100% (31 Dec 2018 08:05) (98% - 100%)    P/E: Alert and oriented; appears comfortable  Pale  No icterus  No cervical LNs  Lungs: Diminished BS but clear  Heart: RR  Abd: Non-tender  No palpable HSM  Exts: Cachectic.               CBC Full  -  ( 31 Dec 2018 07:39 )  WBC Count : 13.8 K/uL  Hemoglobin : 8.4 g/dL  Hematocrit : 27.4 %  Platelet Count - Automated : 417 K/uL  Mean Cell Volume : 95.1 fl  Mean Cell Hemoglobin : 29.2 pg  Mean Cell Hemoglobin Concentration : 30.7 g/dL  Auto Neutrophil # : 12.3 K/uL  Auto Lymphocyte # : 0.7 K/uL  Auto Monocyte # : 0.6 K/uL  Auto Eosinophil # : 0.1 K/uL  Auto Basophil # : 0.0 K/uL  Auto Neutrophil % : 89.0 %  Auto Lymphocyte % : 5.3 %  Auto Monocyte % : 4.3 %  Auto Eosinophil % : 0.7 %  Auto Basophil % : 0.1 %    Assessment/Plan: Metastatic esophageal cancer  Had concurrent chemo/RT with CDDP and CI 5 FU  Now  has pulmonary mets and esophageal stricture  Unable to eat.  Had J tube placed - tolerating feeds   Plan for chemo as outpt   RLL PNA on CT chest - on IV Zosyn   Anemia of chronic disease, malignancy.  Transfuse as needed.                              Electronic Signatures:  Frankie Goncalves)  (Signed 30-Dec-2018 14:33)  	Authored: Progress Note, Reason for Admission, Subjective and Objective      Last Updated: 30-Dec-2018 14:33 by Frankie Goncalves)

## 2018-12-31 NOTE — PROGRESS NOTE ADULT - REASON FOR ADMISSION
SOB
esophageal cancer
Difficulty swallowing
SOB

## 2018-12-31 NOTE — PROGRESS NOTE ADULT - ATTENDING COMMENTS
The patient was seen and examined  Chart reviewed  Events noted  I believe that feeding tube placement in this patient with metastatic squamous cell cancer of the esophagus, at age 73, with severe protein energy malnutrition and severe cachexia of malignancy is unethical and will not proceed with operation at this time.  I would like medical oncology to comment on treatment plan and life expectancy.  I would also request an ethics consult.  Will follow.
Agree with above, noted editied where needed    Physical Exam:  CONSTITUTIONAL: Well appearing, cachectic, bilateral temporal wasting, awake, alert and in no apparent distress  CARDIAC: Normal rate, regular rhythm.  Heart sounds S1, S2.  No murmurs, rubs or gallops   RESPIRATORY: Breath sounds clear and equal bilaterally. No wheezes, rhales or rhonchi   R chest chemoport   GASTROENTEROLOGY: Soft nt nd bs + normoactive   EXTREMITIES: No edema, cyanosis or deformity   NEUROLOGICAL: Alert and oriented, no focal deficits, no motor or sensory deficits.  SKIN: No rash, skin turgor poor
Will await ethics consult
COUNSELING:    Face to face meeting to discuss Advanced Care Planning - Time Spent ______ Minutes.  See goals of care note.    More than 50% time spent in counseling and coordinating care. __20____ Minutes.     Thank you for the opportunity to assist with the care of this patient.   [facility] [phone].

## 2019-01-01 VITALS — OXYGEN SATURATION: 88 % | HEART RATE: 55 BPM

## 2019-01-01 LAB
ANION GAP SERPL CALC-SCNC: 21 MMOL/L — HIGH (ref 5–17)
ANISOCYTOSIS BLD QL: SLIGHT — SIGNIFICANT CHANGE UP
BUN SERPL-MCNC: 17 MG/DL — SIGNIFICANT CHANGE UP (ref 8–20)
CALCIUM SERPL-MCNC: 8.6 MG/DL — SIGNIFICANT CHANGE UP (ref 8.6–10.2)
CHLORIDE SERPL-SCNC: 93 MMOL/L — LOW (ref 98–107)
CO2 SERPL-SCNC: 22 MMOL/L — SIGNIFICANT CHANGE UP (ref 22–29)
CREAT SERPL-MCNC: 0.8 MG/DL — SIGNIFICANT CHANGE UP (ref 0.5–1.3)
ELLIPTOCYTES BLD QL SMEAR: SLIGHT — SIGNIFICANT CHANGE UP
EOSINOPHIL # BLD AUTO: 0 K/UL — SIGNIFICANT CHANGE UP (ref 0–0.5)
EOSINOPHIL NFR BLD AUTO: 1 % — SIGNIFICANT CHANGE UP (ref 0–6)
GLUCOSE SERPL-MCNC: 186 MG/DL — HIGH (ref 70–115)
HCT VFR BLD CALC: 27.7 % — LOW (ref 42–52)
HGB BLD-MCNC: 8.3 G/DL — LOW (ref 14–18)
LACTATE SERPL-SCNC: 10.4 MMOL/L — CRITICAL HIGH (ref 0.5–2)
LYMPHOCYTES # BLD AUTO: 0.6 K/UL — LOW (ref 1–4.8)
LYMPHOCYTES # BLD AUTO: 13 % — LOW (ref 20–55)
MACROCYTES BLD QL: SLIGHT — SIGNIFICANT CHANGE UP
MCHC RBC-ENTMCNC: 28.8 PG — SIGNIFICANT CHANGE UP (ref 27–31)
MCHC RBC-ENTMCNC: 30 G/DL — LOW (ref 32–36)
MCV RBC AUTO: 96.2 FL — HIGH (ref 80–94)
METAMYELOCYTES # FLD: 1 % — HIGH (ref 0–0)
MICROCYTES BLD QL: SLIGHT — SIGNIFICANT CHANGE UP
MONOCYTES # BLD AUTO: 0.1 K/UL — SIGNIFICANT CHANGE UP (ref 0–0.8)
MONOCYTES NFR BLD AUTO: 13 % — HIGH (ref 3–10)
MYELOCYTES NFR BLD: 1 % — HIGH (ref 0–0)
NEUTROPHILS # BLD AUTO: 3 K/UL — SIGNIFICANT CHANGE UP (ref 1.8–8)
NEUTROPHILS NFR BLD AUTO: 70 % — SIGNIFICANT CHANGE UP (ref 37–73)
NEUTS BAND # BLD: 1 % — SIGNIFICANT CHANGE UP (ref 0–8)
OVALOCYTES BLD QL SMEAR: SLIGHT — SIGNIFICANT CHANGE UP
PLAT MORPH BLD: ABNORMAL
PLATELET # BLD AUTO: 460 K/UL — HIGH (ref 150–400)
PLATELET CLUMP BLD QL SMEAR: SIGNIFICANT CHANGE UP
POIKILOCYTOSIS BLD QL AUTO: SLIGHT — SIGNIFICANT CHANGE UP
POTASSIUM SERPL-MCNC: 4.2 MMOL/L — SIGNIFICANT CHANGE UP (ref 3.5–5.3)
POTASSIUM SERPL-SCNC: 4.2 MMOL/L — SIGNIFICANT CHANGE UP (ref 3.5–5.3)
RBC # BLD: 2.88 M/UL — LOW (ref 4.6–6.2)
RBC # FLD: 15.7 % — HIGH (ref 11–15.6)
RBC BLD AUTO: ABNORMAL
SODIUM SERPL-SCNC: 136 MMOL/L — SIGNIFICANT CHANGE UP (ref 135–145)
WBC # BLD: 3.6 K/UL — LOW (ref 4.8–10.8)
WBC # FLD AUTO: 3.6 K/UL — LOW (ref 4.8–10.8)

## 2019-01-01 PROCEDURE — 83605 ASSAY OF LACTIC ACID: CPT

## 2019-01-01 PROCEDURE — 36415 COLL VENOUS BLD VENIPUNCTURE: CPT

## 2019-01-01 PROCEDURE — 83735 ASSAY OF MAGNESIUM: CPT

## 2019-01-01 PROCEDURE — 87040 BLOOD CULTURE FOR BACTERIA: CPT

## 2019-01-01 PROCEDURE — 86900 BLOOD TYPING SEROLOGIC ABO: CPT

## 2019-01-01 PROCEDURE — C1725: CPT

## 2019-01-01 PROCEDURE — 87086 URINE CULTURE/COLONY COUNT: CPT

## 2019-01-01 PROCEDURE — T1013: CPT

## 2019-01-01 PROCEDURE — 85027 COMPLETE CBC AUTOMATED: CPT

## 2019-01-01 PROCEDURE — 86901 BLOOD TYPING SEROLOGIC RH(D): CPT

## 2019-01-01 PROCEDURE — 94660 CPAP INITIATION&MGMT: CPT

## 2019-01-01 PROCEDURE — 74176 CT ABD & PELVIS W/O CONTRAST: CPT

## 2019-01-01 PROCEDURE — 71250 CT THORAX DX C-: CPT

## 2019-01-01 PROCEDURE — 99239 HOSP IP/OBS DSCHRG MGMT >30: CPT

## 2019-01-01 PROCEDURE — 84484 ASSAY OF TROPONIN QUANT: CPT

## 2019-01-01 PROCEDURE — 99285 EMERGENCY DEPT VISIT HI MDM: CPT

## 2019-01-01 PROCEDURE — C1769: CPT

## 2019-01-01 PROCEDURE — 87070 CULTURE OTHR SPECIMN AEROBIC: CPT

## 2019-01-01 PROCEDURE — 76942 ECHO GUIDE FOR BIOPSY: CPT

## 2019-01-01 PROCEDURE — 80048 BASIC METABOLIC PNL TOTAL CA: CPT

## 2019-01-01 PROCEDURE — 71275 CT ANGIOGRAPHY CHEST: CPT

## 2019-01-01 PROCEDURE — 84100 ASSAY OF PHOSPHORUS: CPT

## 2019-01-01 PROCEDURE — 71045 X-RAY EXAM CHEST 1 VIEW: CPT | Mod: 26

## 2019-01-01 PROCEDURE — 81001 URINALYSIS AUTO W/SCOPE: CPT

## 2019-01-01 PROCEDURE — 83880 ASSAY OF NATRIURETIC PEPTIDE: CPT

## 2019-01-01 PROCEDURE — 94640 AIRWAY INHALATION TREATMENT: CPT

## 2019-01-01 PROCEDURE — 80053 COMPREHEN METABOLIC PANEL: CPT

## 2019-01-01 PROCEDURE — 87798 DETECT AGENT NOS DNA AMP: CPT

## 2019-01-01 PROCEDURE — 93005 ELECTROCARDIOGRAM TRACING: CPT

## 2019-01-01 PROCEDURE — 87581 M.PNEUMON DNA AMP PROBE: CPT

## 2019-01-01 PROCEDURE — 87186 SC STD MICRODIL/AGAR DIL: CPT

## 2019-01-01 PROCEDURE — 97163 PT EVAL HIGH COMPLEX 45 MIN: CPT

## 2019-01-01 PROCEDURE — C1894: CPT

## 2019-01-01 PROCEDURE — 85610 PROTHROMBIN TIME: CPT

## 2019-01-01 PROCEDURE — 84145 PROCALCITONIN (PCT): CPT

## 2019-01-01 PROCEDURE — 71045 X-RAY EXAM CHEST 1 VIEW: CPT

## 2019-01-01 PROCEDURE — 87633 RESP VIRUS 12-25 TARGETS: CPT

## 2019-01-01 PROCEDURE — 86850 RBC ANTIBODY SCREEN: CPT

## 2019-01-01 PROCEDURE — 87486 CHLMYD PNEUM DNA AMP PROBE: CPT

## 2019-01-01 PROCEDURE — 85730 THROMBOPLASTIN TIME PARTIAL: CPT

## 2019-01-01 PROCEDURE — 71046 X-RAY EXAM CHEST 2 VIEWS: CPT

## 2019-01-01 RX ORDER — IPRATROPIUM/ALBUTEROL SULFATE 18-103MCG
3 AEROSOL WITH ADAPTER (GRAM) INHALATION EVERY 4 HOURS
Qty: 0 | Refills: 0 | Status: DISCONTINUED | OUTPATIENT
Start: 2019-01-01 | End: 2019-01-01

## 2019-01-01 RX ADMIN — Medication 25 MICROGRAM(S): at 06:44

## 2019-01-01 RX ADMIN — PIPERACILLIN AND TAZOBACTAM 25 GRAM(S): 4; .5 INJECTION, POWDER, LYOPHILIZED, FOR SOLUTION INTRAVENOUS at 06:44

## 2019-01-01 RX ADMIN — Medication 3 MILLILITER(S): at 10:15

## 2019-01-01 RX ADMIN — Medication 250 MILLIGRAM(S): at 06:44

## 2019-01-01 RX ADMIN — PANTOPRAZOLE SODIUM 40 MILLIGRAM(S): 20 TABLET, DELAYED RELEASE ORAL at 06:44

## 2019-01-01 NOTE — DISCHARGE NOTE FOR THE EXPIRED PATIENT - SECONDARY DIAGNOSIS.
Aspiration pneumonia, unspecified aspiration pneumonia type, unspecified laterality, unspecified part of lung Dysphagia, unspecified type Head and neck cancer HTN (hypertension) Hypothyroid Severe protein-calorie malnutrition

## 2019-01-01 NOTE — DISCHARGE NOTE FOR THE EXPIRED PATIENT - HOSPITAL COURSE
73 year old male with a history of HTN, HLD, hypothyroid, head and neck cancer s/p left sided facial surgery for tumor resection (), esophageal cancer with esophageal stricture s/p esophageal stent who received concurrent chemo/XRT with cisplatin/5 flurouracil with his last chemo therapy treatment being 1 month ago. He was admitted in November for chest pain and dysphagia. EGD showed esophageal stent stenosis. He was advised to c/w full liquid diet. CT chest was consistent with bilateral nodules concerning for mets. Currently he returned to the Er for worsening sob, dysphagia and cough. Pt admitted with worsening sob likely multifactorial with RVP positive for rhinovirus/Aspiration PNA. He failed dysphagia screen. Completed a total of 7 days of IV zosyn with negative Bcx. Was seen by GI, recommended alterative means of nutrition. G tube placed by IR and tube feeds started. Up titrated feeding to meet continuous goal. In the interim hospital course complicated by leukocytosis which was up trending, repeated ct chest/ abd on the pt which showed new RLL opacity- Asp PNA pt empirically restarted zosyn to finish todal 7 day course.  PEG feeding changed from continuous to bolus feeding. Clinically pt decompensated overnight with nausea/ vomiting, acute respiratory failure with hypoxia, cxr completed with showed recurrent aspiration event. Pt is DNR/DNI and reinstated he wanted no aggressive measures. D/w his friend and primary care taker Brice, who came bedside. Patient was placed on trial of BIPAP, labs returned with lactate of 10. Patient was transferred to SICU and continued to decline. Patient was taken off of BIPAP and placed on comfort. Patient  at 11:36am. Patient with no breath sounds, no pulse. No aggressive measures were taken because patient was DNR/DNI. Family contacted.

## 2019-02-18 NOTE — PHYSICAL THERAPY INITIAL EVALUATION ADULT - LEVEL OF INDEPENDENCE: SIT/SUPINE, REHAB EVAL
FINAL REPORT



PROCEDURE:  CT HEAD/BRAIN WO CON



TECHNIQUE:  Computerized tomography of the head was performed without contrast material. 



HISTORY:  headache 



COMPARISON:  No prior studies are available for comparison.



FINDINGS:  

Skull and scalp: Normal.



Paranasal sinuses: Normal.



Ventricles and subarachnoid spaces: Normal.



Cerebrum: No evidence of hemorrhage, acute infarction or mass .



Cerebellum and brainstem: No evidence of hemorrhage, acute infarction or mass.



Vasculature: Normal.



Comments: None.



IMPRESSION:  

There is no evidence an acute intracranial process. independent

## 2019-06-20 NOTE — ASU PATIENT PROFILE, ADULT - LAST TOBACCO USE (DD-MM-YY)
01-Jan-1996 Anesthesia Type: 1% lidocaine with 1:100,000 epinephrine and 408mcg clindamycin/ml and a 1:10 solution of 8.4% sodium bicarbonate

## 2019-09-06 NOTE — ED ADULT NURSE NOTE - CHPI ED NUR TIMING2
Department of Anesthesiology  Postprocedure Note    Patient: Keenan Jackson  MRN: [id number]  YOB: 1967  Date of evaluation: 9/6/2019  Time:  10:55 AM     Procedure Summary     Date:  09/06/19 Room / Location:  [address] [place] [address] 01 / 1200 [facility] ENDOSCOPY    Anesthesia Start:  1033 Anesthesia Stop:  1054    Procedure:  EGD WITH BX ESOPHAGOGASTRODUODENOSCOPY (N/A ) Diagnosis:  (MORBID OBESITY)    Surgeon:  Kira Quiñones MD Responsible Provider:  Vidhi Montoya MD    Anesthesia Type:  MAC ASA Status:  3          Anesthesia Type: MAC    Adriana Phase I:  9    Dariana Phase II:   9  Last vitals: Reviewed and per EMR flowsheets.        Anesthesia Post Evaluation    Patient location during evaluation: PACU  Patient participation: complete - patient participated  Level of consciousness: awake and alert  Pain score: 0  Airway patency: patent  Nausea & Vomiting: no vomiting and no nausea  Complications: no  Cardiovascular status: hemodynamically stable  Respiratory status: spontaneous ventilation and room air  Hydration status: stable none

## 2020-01-20 NOTE — CONSULT NOTE ADULT - PROBLEM/RECOMMENDATION-3
Problem: Patient Care Overview  Goal: Plan of Care Review  Outcome: Ongoing (interventions implemented as appropriate)  Pt stable. Pt is NSR on the heart monitor.  Pt has had no complaints of pain, pt still has cough, prn tessalon pear given.  Pt received IV ABX.  New neb added for pt continuing to have wheezing.  Plan of care reviewed.  Pt verbalizes understanding.  Pt was free from falls or injuries during duration of shift.  Pt turned and repositioned self.  PIV intact with no redness, swelling or drainage.  Bed low, wheels locked, bed alarm on, call light in reach.  Pt instructed to call for assistance.  Will continue to monitor.         [Annual Visit] : annual visit [FreeTextEntry1] : Patient doing well. Motherhood agreeing with her. DISPLAY PLAN FREE TEXT

## 2020-11-01 NOTE — PATIENT PROFILE ADULT. - HEALTH/HEALTHCARE ANXIETIES, PROFILE
39 yo M, hx of alcohol abuse, first admission for withdrawls in 2015, no hx of DTs, presents with tachycardia, and symptoms of etoh withdrawl. notes 2 episodes of NBNB vomiting earlier in the day. denies abdominal pain or distension. denies diarrhea. denies headache, visual changes, hallucinations, or seizures. denies fever, chills, cough or sore throat. denies sick contacts. denies travel or recent hospitalizations. denies rash. notes no other drug use. last drink was one day ago and has been on a one week binge.
None

## 2020-11-05 NOTE — DIETITIAN INITIAL EVALUATION ADULT. - NUTRITION DIAGNOSTIC TERMINOLOGY #1
Problem: Occupational Therapy Goal  Goal: Occupational Therapy Goal  Description: Goals to be met by: 11/6/2020    Patient will increase functional independence with ADLs by performing:    UE Dressing with Stand-by Assistance.  LE Dressing with Moderate Assistance.  Grooming while EOB with Modified [place].  Toileting from bedside commode with Minimal Assistance for hygiene and clothing management.   Rolling to Bilateral with Supervision .   Supine to sit with Supervision.  Stand pivot transfers with Supervision.  Toilet transfer to bedside commode with Supervision.    Goals remain appropriate. Pt met goal related to bed mobility and updated to correlate. Continue OT.  Tangela Green OT  11/5/2020    Outcome: Ongoing, Progressing      Nutrient

## 2021-04-21 NOTE — GOALS OF CARE CONVERSATION - PERSONAL ADVANCE DIRECTIVE - STAFF/NURSE
Tracie  [facility] Calcipotriene Pregnancy And Lactation Text: This medication has not been proven safe during pregnancy. It is unknown if this medication is excreted in breast milk.

## 2021-09-15 NOTE — ED PROVIDER NOTE - DATE/TIME 1
Pt presents for routine influenza vaccine. Administered per orders. Tolerated without complaint. No s/s of adverse reaction noted.
12-Nov-2018 23:16

## 2022-08-08 NOTE — CONSULT NOTE ADULT - CONSULT REQUESTED BY NAME
HTN Report: Per chart review, patient has an appointment with PCP on 8/16/22 for annual exam.     
Dr. Jackson
Juanita Jackson
[facility]
Dr. Garcia

## 2022-09-12 NOTE — ED PROVIDER NOTE - SECONDARY DIAGNOSIS.
Left detailed message. Labs normal. Proceed with plan as discussed. Return call with any questions or concerns    Esophageal mass

## 2023-10-30 NOTE — PROGRESS NOTE ADULT - SUBJECTIVE AND OBJECTIVE BOX
What Is The Reason For Today's Visit?: Annual Full Body Skin Examination with No Concerns CC: poor po intake  HPI: 73 y.o. male with PMHx of HTN, HLD, recent diagnosis of esophageal CA p/w difficulty swallowing associated with tolerance of liquids only, weight loss, lethargy and chest discomfort.      INTERVAL HPI/OVERNIGHT EVENTS: no complaints, awaiting EGD with possible dilatation and stent placement on monday  Other ROS reviewed and neg     Vital Signs Last 24 Hrs  T(C): 37.2 (24 Jun 2018 16:10), Max: 37.2 (24 Jun 2018 04:40)  T(F): 98.9 (24 Jun 2018 16:10), Max: 99 (24 Jun 2018 04:40)  HR: 53 (24 Jun 2018 16:10) (51 - 55)  BP: 123/62 (24 Jun 2018 16:10) (121/56 - 143/62)  RR: 18 (24 Jun 2018 16:10) (18 - 18)  SpO2: 99% (24 Jun 2018 16:10) (97% - 99%)                        9.6    9.0   )-----------( 308      ( 23 Jun 2018 06:02 )             30.0     23 Jun 2018 06:00    142    |  105    |  4.0    ----------------------------<  94     3.8     |  28.0   |  0.74     Ca    8.4        23 Jun 2018 06:00      PT/INR - ( 23 Jun 2018 06:01 )   PT: 14.7 sec;   INR: 1.33 ratio     MEDICATIONS  (STANDING):  amLODIPine   Tablet 5 milliGRAM(s) Oral daily  enoxaparin Injectable 40 milliGRAM(s) SubCutaneous daily  ferrous    sulfate 325 milliGRAM(s) Oral three times a day  levothyroxine 25 MICROGram(s) Oral daily  pantoprazole  Injectable 40 milliGRAM(s) IV Push daily  simvastatin 10 milliGRAM(s) Oral at bedtime  sodium chloride 0.9%. 1000 milliLiter(s) (100 mL/Hr) IV Continuous <Continuous>    RADIOLOGY & ADDITIONAL TESTS: personally visualized    PHYSICAL EXAM:    General: debilitated male in no acute distress  Eyes: PERRLA, EOMI; conjunctiva and sclera clear  Head: Normocephalic; atraumatic  ENMT: No nasal discharge; airway clear  Neck: Supple; non tender; no masses  Respiratory: No wheezes, rales or rhonchi  Cardiovascular: Regular rate and rhythm. S1 and S2   Gastrointestinal: Soft abdomen, NT, + BS  Genitourinary: No costovertebral angle tenderness  Extremities: Normal range of motion, No clubbing, cyanosis or edema  Vascular: Peripheral pulses palpable 2+ bilaterally  Neurological: Alert and oriented x4  Skin: Warm and dry.   Musculoskeletal: Normal tone, without deformities  Psychiatric: Cooperative and appropriate What Is The Reason For Today's Visit? (Being Monitored For X): concerning skin lesions on an annual basis

## 2023-12-12 NOTE — DIETITIAN INITIAL EVALUATION ADULT. - ETIOLOGY
SURVEY:     You may be receiving a survey from [employer] regarding your visit today. Please complete the survey to enable us to provide the highest quality of care to you and your family. If you cannot score us a very good on any question, please call the office to discuss how we could have made your experience a very good one.      Thank you,    Samantha Bullock, APRN-CNP  Jose Juan Mueller, APRN-CNP  Tika Ambrocio, CECILIAN  Senaida Closs, CMA  Reinaldo Wilson, CMA  [place], CMA  [place], PCA  [place], PM
related to inadequate energy intake in setting of dysphagia due to esophageal obstruction, hx of head/neck CA

## 2024-03-14 NOTE — H&P ADULT - PSYCHIATRIC
no bruits B/L negative Affect and characteristics of appearance, verbalizations, behaviors are appropriate

## 2024-04-27 NOTE — DIETITIAN INITIAL EVALUATION ADULT. - % CHANGE
Take medication as prescribed.  Follow-up with your doctor and rheumatologist for further evaluation.  Return for worsening redness, swelling, fever or other concerns.   18.2

## 2024-06-06 NOTE — H&P ADULT - EYES
Elias Tolbertmoiseaiden  : 1936  Primary: Medicare Part A And B (Medicare)  Secondary: [employer] MEDICARE SUPP [facility] [place]  [place] [address]  Phone: [phone]  Fax: [phone] Plan Frequency: 2x a week for 90 days    Plan of Care/Certification Expiration Date: 24        Plan of Care/Certification Expiration Date:  Plan of Care/Certification Expiration Date: 24    Frequency/Duration:   Plan Frequency: 2x a week for 90 days      Time In/Out:   Time In: 1030  Time Out: 1130      PT Visit Info:    Progress Note Counter: 6  Canceled Appointment: 1      Visit Count:  16    OUTPATIENT PHYSICAL THERAPY:   Treatment Note 2024       Episode  (L humeral fracture)               Treatment Diagnosis:    Stiffness of left shoulder, not elsewhere classified  Muscle weakness (generalized)  Medical/Referring Diagnosis:    Other nondisplaced fracture of upper end of left humerus, subsequent encounter for fracture with routine healing [S42.295D]    Referring Physician:  Alvino Fontanez MD MD Orders:  PT Eval and Treat   Return MD Appt:  May 2024  Date of Onset:  Onset Date: 02/15/24     Allergies:   Morphine, [place] clam (m. mercenaria) skin test, and Other  Restrictions/Precautions:   None      Interventions Planned (Treatment may consist of any combination of the following):     See Assessment Note    Subjective Comments:   Pt notes he continues to have pain in L shoulder after wall slides.   Initial Pain Level::     4/10  Post Session Pain Level:      3/10  Medications Last Reviewed:  2024  Updated Objective Findings:   L shoulder flexion AAROM in supine to 115 degrees  Treatment   THERAPEUTIC EXERCISE: (40 minutes):  Exercises per grid below to improve mobility and strength.  Required moderate visual, verbal, manual and tactile cues to promote proper body alignment, promote proper body posture and promote proper body mechanics.   detailed exam conjunctiva clear

## 2024-09-03 NOTE — DISCHARGE NOTE ADULT - NS AS DC AMI YN
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  
Plan: Valacyclovir working well. Patient to continue use. Rtc for any changes
Continue Regimen: VALACYCLOVIR tablets
Render In Strict Bullet Format?: No
Detail Level: Zone
Initiate Treatment: Mometasone
Plan: If not resolved with Mometasone, will have pt rtc for IMK.
no

## 2025-01-07 NOTE — ED PROVIDER NOTE - CPE EDP MUSC NORM
Gen: alert and oriented x3  Lung: clear   CV: S1 S2   Ext: No edema  neuro: CN 2-12 grossly intact, no gross motor or sensory deficits appreciated  Skin: normal
normal...

## 2025-01-16 NOTE — DISCHARGE NOTE ADULT - PROVIDER RX CONTACT NUMBER
Patient presented for BP check.    BP:  142/94, P: 72  After 5 minutes:  122/86, P:  76    Patient reports fatigue.  Patient is currently taking Carvedilol 12.5 mg BID, Amlodipine-Benazepril 10-40 mg once daily, Clonidine 0.2  mg BID and hydrochlorothiazide 50 mg once daily.  
[phone]